# Patient Record
Sex: MALE | Race: WHITE | Employment: OTHER | URBAN - METROPOLITAN AREA
[De-identification: names, ages, dates, MRNs, and addresses within clinical notes are randomized per-mention and may not be internally consistent; named-entity substitution may affect disease eponyms.]

---

## 2020-07-16 ENCOUNTER — TELEPHONE (OUTPATIENT)
Dept: PULMONOLOGY | Age: 40
End: 2020-07-16

## 2020-07-16 ENCOUNTER — OFFICE VISIT (OUTPATIENT)
Dept: FAMILY MEDICINE CLINIC | Age: 40
End: 2020-07-16
Payer: MEDICARE

## 2020-07-16 VITALS
HEART RATE: 80 BPM | OXYGEN SATURATION: 97 % | DIASTOLIC BLOOD PRESSURE: 78 MMHG | HEIGHT: 71 IN | WEIGHT: 230.6 LBS | TEMPERATURE: 97.7 F | RESPIRATION RATE: 16 BRPM | BODY MASS INDEX: 32.28 KG/M2 | SYSTOLIC BLOOD PRESSURE: 120 MMHG

## 2020-07-16 PROCEDURE — 99203 OFFICE O/P NEW LOW 30 MIN: CPT | Performed by: NURSE PRACTITIONER

## 2020-07-16 PROCEDURE — 99386 PREV VISIT NEW AGE 40-64: CPT | Performed by: NURSE PRACTITIONER

## 2020-07-16 RX ORDER — TRAZODONE HYDROCHLORIDE 100 MG/1
100 TABLET ORAL NIGHTLY
Qty: 30 TABLET | Refills: 2 | Status: SHIPPED | OUTPATIENT
Start: 2020-07-16 | End: 2020-10-07

## 2020-07-16 RX ORDER — TRAZODONE HYDROCHLORIDE 50 MG/1
50 TABLET ORAL NIGHTLY
COMMUNITY
End: 2020-07-16

## 2020-07-16 RX ORDER — VARENICLINE TARTRATE
KIT
Qty: 1 BOX | Refills: 0 | Status: SHIPPED | OUTPATIENT
Start: 2020-07-16 | End: 2020-08-11

## 2020-07-16 RX ORDER — ATOMOXETINE 80 MG/1
80 CAPSULE ORAL DAILY
COMMUNITY
End: 2020-07-16 | Stop reason: SDUPTHER

## 2020-07-16 RX ORDER — ATOMOXETINE 80 MG/1
80 CAPSULE ORAL DAILY
Qty: 30 CAPSULE | Refills: 2 | Status: SHIPPED | OUTPATIENT
Start: 2020-07-16 | End: 2020-10-07

## 2020-07-16 SDOH — ECONOMIC STABILITY: FOOD INSECURITY: WITHIN THE PAST 12 MONTHS, THE FOOD YOU BOUGHT JUST DIDN'T LAST AND YOU DIDN'T HAVE MONEY TO GET MORE.: NEVER TRUE

## 2020-07-16 SDOH — ECONOMIC STABILITY: TRANSPORTATION INSECURITY
IN THE PAST 12 MONTHS, HAS THE LACK OF TRANSPORTATION KEPT YOU FROM MEDICAL APPOINTMENTS OR FROM GETTING MEDICATIONS?: NO

## 2020-07-16 SDOH — HEALTH STABILITY: PHYSICAL HEALTH: ON AVERAGE, HOW MANY DAYS PER WEEK DO YOU ENGAGE IN MODERATE TO STRENUOUS EXERCISE (LIKE A BRISK WALK)?: 0 DAYS

## 2020-07-16 SDOH — HEALTH STABILITY: MENTAL HEALTH
STRESS IS WHEN SOMEONE FEELS TENSE, NERVOUS, ANXIOUS, OR CAN'T SLEEP AT NIGHT BECAUSE THEIR MIND IS TROUBLED. HOW STRESSED ARE YOU?: ONLY A LITTLE

## 2020-07-16 SDOH — ECONOMIC STABILITY: FOOD INSECURITY: WITHIN THE PAST 12 MONTHS, YOU WORRIED THAT YOUR FOOD WOULD RUN OUT BEFORE YOU GOT MONEY TO BUY MORE.: NEVER TRUE

## 2020-07-16 SDOH — ECONOMIC STABILITY: TRANSPORTATION INSECURITY
IN THE PAST 12 MONTHS, HAS LACK OF TRANSPORTATION KEPT YOU FROM MEETINGS, WORK, OR FROM GETTING THINGS NEEDED FOR DAILY LIVING?: NO

## 2020-07-16 SDOH — ECONOMIC STABILITY: INCOME INSECURITY: HOW HARD IS IT FOR YOU TO PAY FOR THE VERY BASICS LIKE FOOD, HOUSING, MEDICAL CARE, AND HEATING?: NOT HARD AT ALL

## 2020-07-16 ASSESSMENT — ENCOUNTER SYMPTOMS
EYES NEGATIVE: 1
GASTROINTESTINAL NEGATIVE: 1
RESPIRATORY NEGATIVE: 1

## 2020-07-16 ASSESSMENT — PATIENT HEALTH QUESTIONNAIRE - PHQ9
SUM OF ALL RESPONSES TO PHQ QUESTIONS 1-9: 0
SUM OF ALL RESPONSES TO PHQ QUESTIONS 1-9: 0
2. FEELING DOWN, DEPRESSED OR HOPELESS: 0
1. LITTLE INTEREST OR PLEASURE IN DOING THINGS: 0
SUM OF ALL RESPONSES TO PHQ9 QUESTIONS 1 & 2: 0

## 2020-07-16 NOTE — PROGRESS NOTES
2020    Leslie Forman (:  1980) is a 36 y.o. male, here for evaluation of the following medical concerns:    HPI  Ilda Duncan is here to establish care. He does not remember the last time he saw a PCP. , lives with girlfriend. Has 3 kids. Works full time as a . States that he is a recovering alcoholic and drug addict (methamphetamines). He states that he has been clean for 9 months. He does use chewing tobacco.  He would like to get on Chantix again (it worked for him 10 years ago). He has a very positive attitude and states that he is excited that he is getting his \"life together\". He has an appointment with a dentist in August.  He does not get routine vision exams. Denies following a healthy diet or exercise program.      Pt states that he got his Tdap when he was in treatment 9 mths ago. He states that he was prescribed Strattera while in rehab for his ADHD. He did not want to be on a stimulant. They also prescribed Trazodone 50 mg nightly for insomnia. He states that it does help him to fall asleep, but is awake every 3 hours. He states that his girlfriend stated that he snores very loudly, and his smart watch noted his heart rate decreasing to 30-40 bpm at times. He is concerned that he may have sleep apnea. Review of Systems   Constitutional: Negative. HENT: Negative. Eyes: Negative. Respiratory: Negative. Cardiovascular: Negative. Gastrointestinal: Negative. Genitourinary: Negative. Musculoskeletal: Negative. Skin: Negative. Neurological: Negative. Psychiatric/Behavioral: Positive for sleep disturbance. Negative for agitation, behavioral problems, confusion, decreased concentration, dysphoric mood, hallucinations, self-injury and suicidal ideas. The patient is not nervous/anxious and is not hyperactive. Prior to Visit Medications    Medication Sig Taking?  Authorizing Provider   varenicline (CHANTIX STARTING MONTH SHARRI) 0.5 MG X 11 & 1 MG X 42 tablet Take by mouth. Yes AMANDA Mc CNP   traZODone (DESYREL) 100 MG tablet Take 1 tablet by mouth nightly Yes AMANDA Mc CNP   atomoxetine (STRATTERA) 80 MG capsule Take 1 capsule by mouth daily Yes AMANDA Mc CNP        No Known Allergies    Past Medical History:   Diagnosis Date    AA (alcohol abuse)     Depression        Past Surgical History:   Procedure Laterality Date    HERNIA REPAIR         Social History     Socioeconomic History    Marital status: Single     Spouse name: Not on file    Number of children: 3    Years of education: Not on file    Highest education level: Not on file   Occupational History    Not on file   Social Needs    Financial resource strain: Not hard at all   Emotient insecurity     Worry: Never true     Inability: Never true   [employer] needs     Medical: No     Non-medical: No   Tobacco Use    Smoking status: Former Smoker     Last attempt to quit: 2018     Years since quittin.5    Smokeless tobacco: Current User     Types: Chew   Substance and Sexual Activity    Alcohol use: Not Currently     Comment: recovering alcoholic     Drug use: Not Currently     Types: Methamphetamines     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    Sexual activity: Yes     Partners: Female   Lifestyle    Physical activity     Days per week: 0 days     Minutes per session: Not on file    Stress:  Only a little   Relationships    Social connections     Talks on phone: Not on file     Gets together: Not on file     Attends Jainism service: Not on file     Active member of club or organization: Not on file     Attends meetings of clubs or organizations: Not on file     Relationship status: Not on file    Intimate partner violence     Fear of current or ex partner: Not on file     Emotionally abused: Not on file     Physically abused: Not on file     Forced sexual activity: Not on file   Other Topics Concern    Not on file   Social History Narrative    Not on file        Family History   Problem Relation Age of Onset    Alcohol Abuse Maternal Grandfather     Heart Attack Paternal Grandfather     Alcohol Abuse Paternal Grandfather        Vitals:    07/16/20 1034   BP: 120/78   Site: Right Upper Arm   Position: Sitting   Cuff Size: Medium Adult   Pulse: 80   Resp: 16   Temp: 97.7 °F (36.5 °C)   TempSrc: Oral   SpO2: 97%   Weight: 230 lb 9.6 oz (104.6 kg)   Height: 5' 11\" (1.803 m)     Estimated body mass index is 32.16 kg/m² as calculated from the following:    Height as of this encounter: 5' 11\" (1.803 m). Weight as of this encounter: 230 lb 9.6 oz (104.6 kg). Physical Exam  Vitals signs reviewed. Constitutional:       Appearance: Normal appearance. He is well-developed. He is not ill-appearing. HENT:      Head: Normocephalic. Right Ear: Tympanic membrane, ear canal and external ear normal.      Left Ear: Tympanic membrane, ear canal and external ear normal.      Nose: Nose normal.      Mouth/Throat:      Mouth: Mucous membranes are moist.      Pharynx: Oropharynx is clear. Eyes:      General: Lids are normal.         Right eye: No discharge. Left eye: No discharge. Extraocular Movements: Extraocular movements intact. Conjunctiva/sclera: Conjunctivae normal.      Pupils: Pupils are equal, round, and reactive to light. Neck:      Musculoskeletal: Full passive range of motion without pain and normal range of motion. Normal range of motion. No edema, crepitus or pain with movement. Thyroid: No thyroid mass, thyromegaly or thyroid tenderness. Vascular: Normal carotid pulses. No carotid bruit or JVD. Cardiovascular:      Rate and Rhythm: Normal rate and regular rhythm. Pulses: Normal pulses. Heart sounds: Normal heart sounds. No murmur. Pulmonary:      Effort: Pulmonary effort is normal.      Breath sounds: Normal breath sounds.    Abdominal: General: Abdomen is flat. Bowel sounds are normal.      Palpations: Abdomen is soft. Tenderness: There is no abdominal tenderness. Musculoskeletal: Normal range of motion. Lymphadenopathy:      Cervical: No cervical adenopathy. Right cervical: No superficial cervical adenopathy. Left cervical: No superficial cervical adenopathy. Skin:     General: Skin is warm and dry. Capillary Refill: Capillary refill takes less than 2 seconds. Neurological:      General: No focal deficit present. Mental Status: He is alert and oriented to person, place, and time. Psychiatric:         Mood and Affect: Mood normal.         Behavior: Behavior normal. Behavior is cooperative. Thought Content: Thought content normal.         Judgment: Judgment normal.         ASSESSMENT/PLAN:  1. Encounter to establish care    2. Healthcare maintenance  Pt due for routine labs. Encouraged healthy diet and exercise. Also recommended that he get routine vision exams.    - CBC Auto Differential  - Comprehensive Metabolic Panel w/ Reflex to MG  - HIV Screen  - Lipid Panel; Future  - T4, Free  - TSH without Reflex  - HEMOGLOBIN A1C; Future    3. Encounter for tobacco use cessation counseling  Pt states that he used Chantix 10 years ago and successfully quit using all tobacco.  He states that he currently uses chewing tobacco, and would like to try Chantix again. - varenicline (CHANTIX STARTING MONTH SHARRI) 0.5 MG X 11 & 1 MG X 42 tablet; Take by mouth. Dispense: 1 box; Refill: 0    4. Attention deficit hyperactivity disorder (ADHD), unspecified ADHD type  Pt states that he has a long h/o ADHD. He does not want to take an amphetamine d/t his drug use history. States that he was prescribed Strattera while in rehab and it is working well for him. - atomoxetine (STRATTERA) 80 MG capsule; Take 1 capsule by mouth daily  Dispense: 30 capsule; Refill: 2    5. Insomnia, unspecified type  H/o insomnia.   States that he was prescribed Trazodone 50 mg nightly while in rehab. He states that it was working well for him, but now he is having difficulty staying asleep. Will increase to 100 mg nightly. Pt aware that the Chantix may cause sleep disturbances, will monitor.      - traZODone (DESYREL) 100 MG tablet; Take 1 tablet by mouth nightly  Dispense: 30 tablet; Refill: 2    6. Sleep disturbance  Pt states that he wakes up every 3 hours. His girlfriend complains that he snores very loudly, and he noticed that his smart watch recorded his heart rate dropping to 30-40 beats/min while sleeping. He is concerned that he may have sleep apnea and would like to do a sleep study. - Devi Razo MD, Sleep Medicine, [facility]      Return in about 3 months (around 10/16/2020) for smoking cessation, ADHD, insomnia, sleep disturbance. An  electronic signature was used to authenticate this note.     --AMANDA Finch - CNP on 7/16/2020 at 11:53 AM

## 2020-07-16 NOTE — TELEPHONE ENCOUNTER
Within this Telehealth Consent, the terms you and yours refer to the person using the Telehealth Service (Service), or in the case of a use of the Service by or on behalf of a minor, you and yours refer to and include (i) the parent or legal guardian who provides consent to the use of the Service by such minor or uses the Service on behalf of such minor, and (ii) the minor for whom consent is being provided or on whose behalf the Service is being utilized. When using Service, you will be consulting with your health care providers via the use of Telehealth.   Telehealth involves the delivery of healthcare services using electronic communications, information technology or other means between a healthcare provider and a patient who are not in the same physical location. Telehealth may be used for diagnosis, treatment, follow-up and/or patient education, and may include, but is not limited to, one or more of the following:    Electronic transmission of medical records, photo images, personal health information or other data between a patient and a healthcare provider    Interactions between a patient and healthcare provider via audio, video and/or data communications    Use of output data from medical devices, sound and video files    Anticipated Benefits   The use of Telehealth by your Provider(s) through the Service may have the following possible benefits:    Making it easier and more efficient for you to access medical care and treatment for the conditions treated by such Provider(s) utilizing the Service    Allowing you to obtain medical care and treatment by Provider(s) at times that are convenient for you    Enabling you to interact with Provider(s) without the necessity of an in-office appointment     Possible Risks   While the use of Telehealth can provide potential benefits for you, there are also potential risks associated with the use of Telehealth.  These risks include, but may not be limited to the following:    Your Provider(s) may not able to provide medical treatment for your particular condition and you may be required to seek alternative healthcare or emergency care services.  The electronic systems or other security protocols or safeguards used in the Service could fail, causing a breach of privacy of your medical or other information.  Given regulatory requirements in certain jurisdictions, your Provider(s) diagnosis and/or treatment options, especially pertaining to certain prescriptions, may be limited. Acceptance   1. You understand that Services will be provided via Telehealth. This process involves the use of HIPAA compliant and secure, real-time audio-visual interfacing with a qualified and appropriately trained provider located at [facility]. 2. You understand that, under no circumstances, will this session be recorded. 3. You understand that the Provider(s) at [facility] and other clinical participants will be party to the information obtained during the Telehealth session in accordance with best medical practices. 4. You understand that the information obtained during the Telehealth session will be used to help determine the most appropriate treatment options. 5. You understand that You have the right to revoke this consent at any point in time. 6. You understand that Telehealth is voluntary, and that continued treatment is not dependent upon consent. 7. You understand that, in the event of non-consent to Telehealth services and/or technical difficulties, you will obtain services as typically provided in the absence of Telehealth technology. 8. You understand that this consent will be kept in Your medical record. 9. No potential benefits from the use of Telehealth or specific results can be guaranteed. Your condition may not be cured or improved and, in some cases, may get worse.    10. There are limitations in the provision of medical care and treatment via Telehealth and the Service and you may not be able to receive diagnosis and/or treatment through the Service for every condition for which you seek diagnosis and/or treatment. 11. There are potential risks to the use of Telehealth, including but not limited to the risks described in this Telehealth Consent. 12. Your Provider(s) have discussed the use of Telehealth and the Service with you, including the benefits and risks of such and you have provided oral consent to your Provider(s) for the use of Telehealth and the Service. 15. You understand that it is your duty to provide your Provider(s) truthful, accurate and complete information, including all relevant information regarding care that you may have received or may be receiving from other healthcare providers outside of the Service. 14. You understand that each of your Provider(s) may determine in his or sole discretion that your condition is not suitable for diagnosis and/or treatment using the Service, and that you may need to seek medical care and treatment a specialist or other healthcare provider, outside of the Service. 15. You understand that you are fully responsible for payment for all services provided by Provider(s) or through use of the Service and that you may not be able to use third-party insurance. 16. You represent that (a) you have read this Telehealth Consent carefully, (b) you understand the risks and benefits of the Service and the use of Telehealth in the medical care and treatment provided to you by Provider(s) using the Service, and (c) you have the legal capacity and authority to provide this consent for yourself and/or the minor for which you are consenting under applicable federal and state laws, including laws relating to the age of [de-identified] and/or parental/guardian consent.    17. You give your informed consent to the use of Telehealth by Provider(s) using the Service under the terms described in the Terms of Service and this Telehealth Consent. The patient was read the following statement and has consented to the visit as of 7/16/20. The patient has been scheduled for their first telehealth visit on 7/17/20 with [email or web address].

## 2020-07-16 NOTE — PATIENT INSTRUCTIONS
Patient Education        Well Visit, Ages 25 to 48: Care Instructions  Your Care Instructions     Physical exams can help you stay healthy. Your doctor has checked your overall health and may have suggested ways to take good care of yourself. He or she also may have recommended tests. At home, you can help prevent illness with healthy eating, regular exercise, and other steps. Follow-up care is a key part of your treatment and safety. Be sure to make and go to all appointments, and call your doctor if you are having problems. It's also a good idea to know your test results and keep a list of the medicines you take. How can you care for yourself at home? · Reach and stay at a healthy weight. This will lower your risk for many problems, such as obesity, diabetes, heart disease, and high blood pressure. · Get at least 30 minutes of physical activity on most days of the week. Walking is a good choice. You also may want to do other activities, such as running, swimming, cycling, or playing tennis or team sports. Discuss any changes in your exercise program with your doctor. · Do not smoke or allow others to smoke around you. If you need help quitting, talk to your doctor about stop-smoking programs and medicines. These can increase your chances of quitting for good. · Talk to your doctor about whether you have any risk factors for sexually transmitted infections (STIs). Having one sex partner (who does not have STIs and does not have sex with anyone else) is a good way to avoid these infections. · Use birth control if you do not want to have children at this time. Talk with your doctor about the choices available and what might be best for you. · Protect your skin from too much sun. When you're outdoors from 10 a.m. to 4 p.m., stay in the shade or cover up with clothing and a hat with a wide brim. Wear sunglasses that block UV rays.  Even when it's cloudy, put broad-spectrum sunscreen (SPF 30 or higher) on any exposed skin. · See a dentist one or two times a year for checkups and to have your teeth cleaned. · Wear a seat belt in the car. Follow your doctor's advice about when to have certain tests. These tests can spot problems early. For everyone  · Cholesterol. Have the fat (cholesterol) in your blood tested after age 21. Your doctor will tell you how often to have this done based on your age, family history, or other things that can increase your risk for heart disease. · Blood pressure. Have your blood pressure checked during a routine doctor visit. Your doctor will tell you how often to check your blood pressure based on your age, your blood pressure results, and other factors. · Vision. Talk with your doctor about how often to have a glaucoma test.  · Diabetes. Ask your doctor whether you should have tests for diabetes. · Colon cancer. Your risk for colorectal cancer gets higher as you get older. Some experts say that adults should start regular screening at age 48 and stop at age 76. Others say to start before age 48 or continue after age 76. Talk with your doctor about your risk and when to start and stop screening. For women  · Breast exam and mammogram. Talk to your doctor about when you should have a clinical breast exam and a mammogram. Medical experts differ on whether and how often women under 50 should have these tests. Your doctor can help you decide what is right for you. · Cervical cancer screening test and pelvic exam. Begin with a Pap test at age 24. The test often is part of a pelvic exam. Starting at age 27, you may choose to have a Pap test, an HPV test, or both tests at the same time (called co-testing). Talk with your doctor about how often to have testing. · Tests for sexually transmitted infections (STIs). Ask whether you should have tests for STIs. You may be at risk if you have sex with more than one person, especially if your partners do not wear condoms.   For men  · Tests for nicotine into your mouth. · Patches stick to your skin. They slowly release nicotine into your bloodstream.  · An inhaler has a mendez that contains nicotine. You breathe in a puff of nicotine vapor through your mouth and throat. · Nasal spray releases a mist that contains nicotine. What are key points about this decision? · Using medicines can double your chances of quitting smoking. They can ease cravings and withdrawal symptoms. · Getting counseling along with using medicine can raise your chances of quitting even more. · If you smoke fewer than 5 cigarettes a day, you may not need medicines to help you quit smoking. · These medicines have less nicotine than cigarettes. And by itself, nicotine is not nearly as harmful as smoking. The tars, carbon monoxide, and other toxic chemicals in tobacco cause the harmful effects. · The side effects of nicotine replacement products depend on the type of product. For example, a patch can make your skin red and itchy. Medicines in pill form can make you sick to your stomach. They can also cause dry mouth and trouble sleeping. For most people, the side effects are not bad enough to make them stop using the products. Why might you choose to use medicines to quit smoking? · You have tried on your own to stop smoking, but you were not able to stop. · You smoke more than 5 cigarettes a day. · You want to increase your chances of quitting smoking. · You want to reduce your cravings and withdrawal symptoms. · You feel the benefits of medicine outweigh the side effects. Why might you choose not to use medicine? · You want to try quitting on your own by stopping all at once (\"cold turkey\"). · You want to cut back slowly on the number of cigarettes you smoke. · You smoke fewer than 5 cigarettes a day. · You do not like using medicine. · You feel the side effects of medicines outweigh the benefits. · You are worried about the cost of medicines.   Your decision  Thinking about the facts and your feelings can help you make a decision that is right for you. Be sure you understand the benefits and risks of your options, and think about what else you need to do before you make the decision. Where can you learn more? Go to [email or web address] [email or web address] org and sign in to your Meet My Friends account. Enter R466 in the OnShift box to learn more about \"Deciding About Using Medicines To Quit Smoking. \"     If you do not have an account, please click on the \"Sign Up Now\" link. Current as of: March 12, 2020               Content Version: 12.5  © 2096-8539 [employer]. Care instructions adapted under license by [facility]. If you have questions about a medical condition or this instruction, always ask your healthcare professional. [address] any warranty or liability for your use of this information. Patient Education        Insomnia: Care Instructions  Your Care Instructions     Insomnia is the inability to sleep well. It is a common problem for most people at some time. Insomnia may make it hard for you to get to sleep, stay asleep, or sleep as long as you need to. This can make you tired and grouchy during the day. It can also make you forgetful, less effective at work, and unhappy. Insomnia can be caused by conditions such as depression or anxiety. Pain can also affect your ability to sleep. When these problems are solved, the insomnia usually clears up. But sometimes bad sleep habits can cause insomnia. If insomnia is affecting your work or your enjoyment of life, you can take steps to improve your sleep. Follow-up care is a key part of your treatment and safety. Be sure to make and go to all appointments, and call your doctor if you are having problems. It's also a good idea to know your test results and keep a list of the medicines you take. How can you care for yourself at home?   What to avoid Information box to learn more about \"Insomnia: Care Instructions. \"     If you do not have an account, please click on the \"Sign Up Now\" link. Current as of: December 16, 2019               Content Version: 12.5  © 2226-8928 [employer]. Care instructions adapted under license by [facility] ([facility]). If you have questions about a medical condition or this instruction, always ask your healthcare professional. [address] any warranty or liability for your use of this information. Patient Education        Learning About Attention Deficit Hyperactivity Disorder (ADHD) in Adults  What is ADHD? Attention deficit hyperactivity disorder (ADHD) is a condition in which people have a hard time paying attention. Adults with ADHD also may be more active than normal. They tend to act without thinking. ADHD may make it harder for them to focus, get organized, and finish tasks. ADHD most often starts in childhood and lasts into adulthood. Many adults don't know that they have ADHD until their children are diagnosed. Then they begin to see their own symptoms. Doctors don't know what causes ADHD. But it tends to run in families. What are the symptoms? The most common types of ADHD symptoms in adults are attention problems and hyperactivity. Attention problems  Adults with ADHD often find it hard to:  · Finish tasks that don't interest them or aren't easy. But they may become obsessed with activities that they find interesting and enjoy. · Keep relationships. · Focus their attention on conversations, reading materials, or jobs. They may change jobs a lot. · Remember things. They may misplace or lose things. · Pay attention. They are easily distracted. They find it hard to focus on one task. · Think before they act. They may make quick decisions. They may act before they think about the effect of their actions. Hyperactivity  Adults with ADHD may:  · Fidget.  They may swing their legs, shift in their seats, or tap their fingers. · Move around a lot. They may feel \"revved up\" or on the go. They may not be able to slow down until they are very tired. · Find it hard to relax. They may feel restless and find it hard to do quiet things like read or watch TV. How does ADHD affect daily life? ADHD in adults may affect:  · Job performance. They may find it hard to organize their work, manage their time, and focus on one task at a time. They may forget, misplace, or lose things. They may quit their jobs out of boredom. · Relationships. Adults with ADHD may find it hard to focus their attention on conversations. It is hard for them to \"read\" the behavior and moods of others and express their own feelings. · Temper. They may get easily frustrated. This often can make it harder for them to deal with stress. These adults may overreact and have a short, quick temper. · The ability to solve problems. Adults who have a hard time waiting for things they want may act before they think about the effect of their actions. They may take part in risky behaviors. These include unprotected sex, unsafe driving, alcohol and drug use, or unwise business ventures. How is ADHD treated? ADHD can be treated with medicines, behavior training, or counseling. Or it may be a combination of these treatments. Medicines  Stimulant medicines are most often used to treat ADHD. These may include:  · Amphetamines (such as Adderall and Dexedrine). · Methylphenidate (such as Concerta, Daytrana, Focalin, Metadate, and Ritalin). Other medicines that may be used are:  · Atomoxetine, such as Strattera, a nonstimulant medicine for ADHD. · Antihypertensives. These include clonidine (such as Catapres) and guanfacine (such as Tenex). · Antidepressants, which include bupropion (Wellbutrin). Behavior training  Behavior training can help adults with ADHD learn how to:  · Get organized.  A daily organizer or planner can help these

## 2020-07-17 ENCOUNTER — VIRTUAL VISIT (OUTPATIENT)
Dept: PULMONOLOGY | Age: 40
End: 2020-07-17
Payer: MEDICARE

## 2020-07-17 PROCEDURE — 99203 OFFICE O/P NEW LOW 30 MIN: CPT | Performed by: NURSE PRACTITIONER

## 2020-07-17 ASSESSMENT — SLEEP AND FATIGUE QUESTIONNAIRES
HOW LIKELY ARE YOU TO NOD OFF OR FALL ASLEEP WHILE SITTING INACTIVE IN A PUBLIC PLACE: 0
HOW LIKELY ARE YOU TO NOD OFF OR FALL ASLEEP WHEN YOU ARE A PASSENGER IN A CAR FOR AN HOUR WITHOUT A BREAK: 1
HOW LIKELY ARE YOU TO NOD OFF OR FALL ASLEEP WHILE SITTING AND TALKING TO SOMEONE: 0
HOW LIKELY ARE YOU TO NOD OFF OR FALL ASLEEP IN A CAR, WHILE STOPPED FOR A FEW MINUTES IN TRAFFIC: 0
HOW LIKELY ARE YOU TO NOD OFF OR FALL ASLEEP WHILE SITTING AND READING: 1
HOW LIKELY ARE YOU TO NOD OFF OR FALL ASLEEP WHILE SITTING QUIETLY AFTER LUNCH WITHOUT ALCOHOL: 1
HOW LIKELY ARE YOU TO NOD OFF OR FALL ASLEEP WHILE LYING DOWN TO REST IN THE AFTERNOON WHEN CIRCUMSTANCES PERMIT: 2
ESS TOTAL SCORE: 7
HOW LIKELY ARE YOU TO NOD OFF OR FALL ASLEEP WHILE WATCHING TV: 2

## 2020-07-17 NOTE — LETTER
[facility] Pulmonary, Critical Care, and Sleep  [address] [facility] [address] [address]  Phone: [phone]  Fax: [phone]    AMANDA Yi -*        July 17, 2020       Patient: Josef Romero   MR Number: <[id number]>   YOB: 1980   Date of Visit: 7/17/2020       Dear Juan J Cost:    Thank you for the request for consultation for Josef Romero to me for the evaluation of sleep disturbance. Below are the relevant portions of my assessment and plan of care. Assessment:       · Snoring  · Observed sleep apnea   · Hypersomnia   · ADHD- taking straterra  · Obesity  · Sleep onset insomnia-psychophysiological hyperarousal, poor sleep hygiene, probable CHAYO likely contributing. Taking trazodone per PCP with good benefit  · History of alcohol abuse- clean/sober 18 months        Plan:      · HST to evaluate forsleep related breathing disorder. · Treatment options were discussed with patient if HST reveals CHAYO, including CPAP therapy, oral appliances and upper airway surgery. Patient is in favor of auto CPAP if HST is positive for CHAYO  Trial auto CPAP 816 CM H2O if HST is positive for obstructive sleep apnea  · Sleep hygiene  · Cognitive behavioral therapy was discussed with patient including stimulus control and sleep restriction  · Avoid sedatives, alcohol and caffeinated drinks at bed time. · No driving motorized vehicles or operating heavy machinery while fatigue, drowsy or sleepy. · Weight loss is also recommended as a long-term intervention. · Complications of CHAYO if not treated were discussed with patient patient to include systemic hypertension, pulmonary hypertension, cardiovascular morbidities, car accidents and all cause mortality. Discussed pathophysiology of CHAYO with patient today- video shown in office. Patient education regarding sleep tips    If you have questions, please do not hesitate to call me.  I look forward to following Yoselyn Diaz along with you.     Sincerely,        AMANDA Bergeron CNP    CC providers:  AMANDA Wayne CNP  [address] [place] [id number] [facility]  VIA [place]

## 2020-07-17 NOTE — PROGRESS NOTES
Patient ID: Seth Garcia is a 36 y.o. male who is being seen today for   Chief Complaint   Patient presents with    New Patient     referred by Doug Murphy CNP       Referring: COLIN Moffett  HPI:   Seth Garcia is a 36 y.o. male for televideo appointment via video and audio doxy. me virtual visit for sleep disturbance evaluation. Patient reports snoring at night for the past 10 years. Worse in supine position. Wakes self snoring. Has witnessed apnea. No restorative sleep. +dry mouth upon awakening, sometimes sore throat. Fatigue and tiredness during the day. No history of sleep apnea. Bedtime 930-10 pm and rise time is 630 am. It takes few minutes to fall asleep takes trazodone  mg. +TV in bedroom, sleeps with TV on. No nocturia. Wakes up 3-4 times at night, gets a drink then back to bed. It takes 5-10 minutes to fall back a sleep. Takes 1 nap during the day ( 45 minutes). No headache in am. States fitbit says HR in 30-40s at night at times. No car wrecks or near wrecks because of the sleepiness. No nodding off while driving. Gained 50 pounds in the past 1-2 years. No forgetfulness or decreased concentration, takes Strattera for ADHD. Drinks 4 caffinated beverages per day. No alcohol, sober 18 mo. No restless feelings in legs at night. No teeth grinding. No nightmares. No sleep walking. No night time panic attacks. No narcotics. No drug abuse. +history of depression, no medications states feels pretty well controlled. No history of anxiety. No history of atrial fibrillation. No history of DM. No history of HTN. No history of ischemic heart disease. No history of stroke. ESS is 7. No smoking, chews tobacco- using chantix. No FH for RLS or narcolepsy.  +FH for CHAYO    Sleep Medicine 7/17/2020   Sitting and reading 1   Watching TV 2   Sitting, inactive in a public place (e.g. a theatre or a meeting) 0   As a passenger in a car for an hour without a break 1   Lying down to rest in the Neck: No visualized mass. Trachea is midline   Eyes: EOM intact. No visible discharge. Resp:No visualized signs of difficulty breathing or respiratory distress, speaking in full sentences. Respiratory effort normal.  Neuro: Awake. Alert. Able to follow commands. No facial asymmetry. Psych: Oriented x 3. No anxiety. Normal affect. DATA:       Assessment:       · Snoring  · Observed sleep apnea   · Hypersomnia   · ADHD- taking straterra  · Obesity  · Sleep onset insomnia-psychophysiological hyperarousal, poor sleep hygiene, probable CHAYO likely contributing. Taking trazodone per PCP with good benefit  · History of alcohol abuse- clean/sober 18 months        Plan:      · HST to evaluate forsleep related breathing disorder. · Treatment options were discussed with patient if HST reveals CHAYO, including CPAP therapy, oral appliances and upper airway surgery. Patient is in favor of auto CPAP if HST is positive for CHAYO  Trial auto CPAP 8-16 CM H2O if HST is positive for obstructive sleep apnea  · Sleep hygiene  · Cognitive behavioral therapy was discussed with patient including stimulus control and sleep restriction  · Avoid sedatives, alcohol and caffeinated drinks at bed time. · No driving motorized vehicles or operating heavy machinery while fatigue, drowsy or sleepy. · Weight loss is also recommended as a long-term intervention. · Complications of CHAYO if not treated were discussed with patient patient to include systemic hypertension, pulmonary hypertension, cardiovascular morbidities, car accidents and all cause mortality. Discussed pathophysiology of CHAYO with patient today- video shown in office. Patient education regarding sleep tips    Consent for telehealth visit was obtained and is noted in chart      THIS VISIT WAS COMPLETED VIRTUALLY USING DOXY. Joe Duran is a 36 y.o. male being evaluated by a Virtual Visit (video visit) encounter to address concerns as mentioned above.   SUSHIL caregiver was present when appropriate. Due to this being a TeleHealth encounter (During TQREY-46 public health emergency), evaluation of the following organ systems was limited: Vitals/Constitutional/EENT/Resp/CV/GI//MS/Neuro/Skin/Heme-Lymph-Imm. Pursuant to the emergency declaration under the [address] [place] [address], [address] [place] [address] and the [employer] and [employer] Act, this Virtual Visit was conducted with patient's (and/or legal guardian's) consent, to reduce the patient's risk of exposure to COVID-19 and provide necessary medical care. The patient (and/or legal guardian) has also been advised to contact this office for worsening conditions or problems, and seek emergency medical treatment and/or call 911 if deemed necessary. Patient identification was verified at the start of the visit: Yes    Total time spent for this encounter: Not billed by time    Services were provided through a video synchronous discussion virtually to substitute for in-person clinic visit. Patient and provider were located at their individual homes. --AMANDA Banuelos CNP on 7/17/2020 at 2:48 PM    An electronic signature was used to authenticate this note.

## 2020-07-17 NOTE — PATIENT INSTRUCTIONS
stimulant or wake-up effect and will cause fragmented sleep. Sleep rituals are important. Give your body clues it is time to slow down and sleep. Examples include; yoga, deep breathing, listen to relaxing music, a hot bath or a few minutes of reading. Have a fixed bedtime and awakening time, Even on weekends! You will feel better keeping a regular sleep cycle, even if you are retired or not working. Get into your favorite sleep position. If not asleep in 30 minutes, get up and do something boring until you feel sleepy. Remember not to expose yourself to bright lights such as TV, phone or tablet screens. Only use your bed for sleeping. Do not use your bed as an office, workroom or recreation room. Use comfortable bedding. Uncomfortable bedding can prevent good sleep. Ensure your bedroom is quiet and comfortable. A cooler room along with enough blankets to stay warm is recommended. If your room is too noisy, try a white noise machine. If too bright, try black out shades or an eye mask. Dont take worries to bed. Leave worries about work, school etc. behind you when you go to bed. Some people find it helpful to assign a worry period in the evening or late afternoon to write down your worries and get them out of your system.

## 2020-07-30 ENCOUNTER — VIRTUAL VISIT (OUTPATIENT)
Dept: FAMILY MEDICINE CLINIC | Age: 40
End: 2020-07-30
Payer: MEDICARE

## 2020-07-30 ENCOUNTER — NURSE TRIAGE (OUTPATIENT)
Dept: OTHER | Facility: CLINIC | Age: 40
End: 2020-07-30

## 2020-07-30 PROCEDURE — 99213 OFFICE O/P EST LOW 20 MIN: CPT | Performed by: PHYSICIAN ASSISTANT

## 2020-07-30 ASSESSMENT — ENCOUNTER SYMPTOMS
DIARRHEA: 0
VOMITING: 0
RHINORRHEA: 0
SINUS PRESSURE: 0
VOICE CHANGE: 1
COUGH: 0
SINUS PAIN: 0
NAUSEA: 0
SHORTNESS OF BREATH: 0
TROUBLE SWALLOWING: 0
CHEST TIGHTNESS: 0
SORE THROAT: 0
EYE ITCHING: 1
WHEEZING: 0

## 2020-07-30 NOTE — LETTER
[address]  [address] [address]  Phone: [phone]  Fax: [phone]    [facility]        July 30, 2020     Patient: Urban Rizo   YOB: 1980   Date of Visit: 7/30/2020       To Whom It May Concern: It is my medical opinion that Urban Rizo may return to full duty immediately with no restrictions starting on 07/27/2020. If you have any questions or concerns, please don't hesitate to call.     Sincerely,          JONAH Corrales

## 2020-07-30 NOTE — PROGRESS NOTES
2020    TELEHEALTH EVALUATION -- Audio/Visual (During [facility]XV[facility]-70 public health emergency)    HPI:    Whit Zapata (:  1980) has requested an audio/video evaluation for the following concern(s):    Fatigue:  Pt took off work the 21, 25, 23, and 24 for fatigue. The patient reports that he started feeling fatigue after re-starting Strattera. He stopped this medication and reports that the symptoms have been steadily improving. He has some itching eyes, post nasal drip and throat hoarseness which he reports is standard for his allergies this time of year. He denies any cough, SOB, diarrhea, nausea, vomiting, loss of taste or smell. He has been requesting a letter stating that he can return to work. He has been at work all of this week. Review of Systems   Constitutional: Positive for fatigue. Negative for activity change, appetite change and fever. HENT: Positive for congestion and voice change. Negative for postnasal drip, rhinorrhea, sinus pressure, sinus pain, sneezing, sore throat and trouble swallowing. Eyes: Positive for itching. Respiratory: Negative for cough, chest tightness, shortness of breath and wheezing. Gastrointestinal: Negative for diarrhea, nausea and vomiting. Musculoskeletal: Negative for myalgias. Hematological: Negative for adenopathy. Prior to Visit Medications    Medication Sig Taking? Authorizing Provider   varenicline (CHANTIX STARTING MONTH SHARRI) 0.5 MG X 11 & 1 MG X 42 tablet Take by mouth.  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        No Known Allergies,   Past Medical History:   Diagnosis Date    AA (alcohol abuse)     Depression    ,   Past Surgical History:   Procedure Laterality Date    HERNIA REPAIR     ,   Social History     Tobacco Use    Smoking status: Former Smoker     Packs/day: 1.00     Years: 14.00     Pack years: 14.00     Last attempt to quit: 2018     Years since quittin.5    Smokeless tobacco: Current User     Types: Chew   Substance Use Topics    Alcohol use: Not Currently     Comment: recovering alcoholic [id number]    Drug use: Not Currently     Types: Methamphetamines     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       PHYSICAL EXAMINATION:  [ INSTRUCTIONS:  \"[x]\" Indicates a positive item  \"[]\" Indicates a negative item  -- DELETE ALL ITEMS NOT EXAMINED]  Vital Signs: (As obtained by patient/caregiver or practitioner observation)    Blood pressure-  Heart rate-    Respiratory rate- 14   Temperature-Pt does not have access to a thermometer  Pulse oximetry-     Constitutional: [x] Appears well-developed and well-nourished [x] No apparent distress      [] Abnormal-   Mental status  [x] Alert and awake  [x] Oriented to person/place/time [x]Able to follow commands      Eyes:  EOM    []  Normal  [] Abnormal-  Sclera  []  Normal  [] Abnormal -         Discharge []  None visible  [] Abnormal -    HENT:   [x] Normocephalic, atraumatic.   [] Abnormal   [x] Mouth/Throat: Mucous membranes are moist.     External Ears [x] Normal  [] Abnormal-     Neck: [x] No visualized mass     Pulmonary/Chest: [x] Respiratory effort normal.  [x] No visualized signs of difficulty breathing or respiratory distress        [] Abnormal-      Musculoskeletal:   [] Normal gait with no signs of ataxia         [x] Normal range of motion of neck        [] Abnormal- Neurological:        [x] No Facial Asymmetry (Cranial nerve 7 motor function) (limited exam to video visit)          [] No gaze palsy        [] Abnormal-         Skin:        [x] No significant exanthematous lesions or discoloration noted on facial skin         [] Abnormal-            Psychiatric:       [] Normal Affect [] No Hallucinations        [] Abnormal-     Other pertinent observable physical exam findings-     ASSESSMENT/PLAN:  1. Seasonal allergies  -  Zyrtec or flonase for allergies    2. Medication side effect  -  Pt ok to return to work. Will email note for him today      No follow-ups on file. Mag Mejia is a 36 y.o. male being evaluated by a Virtual Visit (video visit) encounter to address concerns as mentioned above. A caregiver was present when appropriate. Due to this being a TeleHealth encounter (During [facility]-13 public health emergency), evaluation of the following organ systems was limited: Vitals/Constitutional/EENT/Resp/CV/GI//MS/Neuro/Skin/Heme-Lymph-Imm. Pursuant to the emergency declaration under the [address] [place] [address] authority and the [employer] and [employer] Act, this Virtual Visit was conducted with patient's (and/or legal guardian's) consent, to reduce the patient's risk of exposure to COVID-19 and provide necessary medical care. The patient (and/or legal guardian) has also been advised to contact this office for worsening conditions or problems, and seek emergency medical treatment and/or call 911 if deemed necessary. Patient identification was verified at the start of the visit: Yes    Total time spent on this encounter: Not billed by time    Services were provided through a video synchronous discussion virtually to substitute for in-person clinic visit. Patient and provider were located at their individual homes.     --JONAH Cao on 7/30/2020 at 5:01 PM    An electronic signature was used to authenticate this note.

## 2020-07-30 NOTE — TELEPHONE ENCOUNTER
Hadn't taken strattera for 3 months, after starting strattera pt developed symptoms of fatigue and headache. Pt symptoms resolved after stopped taking medications again. Pt states he missed work for a week and needs a doctors note    transferrred call to [address] [place] [address]. Reason for Disposition  Jenniferlos Sánchez Caller has already spoken with another triager or PCP (or office), and has further questions and triager able to answer questions.     Protocols used: NO CONTACT OR DUPLICATE CONTACT CALL-ADULT-[place]

## 2020-08-11 RX ORDER — VARENICLINE TARTRATE 1 MG/1
1 TABLET, FILM COATED ORAL 2 TIMES DAILY
Qty: 60 TABLET | Refills: 3 | Status: SHIPPED | OUTPATIENT
Start: 2020-08-11 | End: 2020-11-19

## 2020-10-30 RX ORDER — TRAZODONE HYDROCHLORIDE 100 MG/1
TABLET ORAL
Qty: 30 TABLET | Refills: 2 | Status: SHIPPED | OUTPATIENT
Start: 2020-10-30 | End: 2021-01-29

## 2020-10-30 RX ORDER — ATOMOXETINE 80 MG/1
CAPSULE ORAL
Qty: 30 CAPSULE | Refills: 2 | Status: SHIPPED | OUTPATIENT
Start: 2020-10-30 | End: 2020-11-19

## 2020-10-30 NOTE — TELEPHONE ENCOUNTER
Refill Request     Last Seen: 7/30/2020    Last Written: 10/17/2020    Next Appointment:   Future Appointments   Date Time Provider Kinza Cerda   11/4/2020 11:40 AM AMANDA Matson CNP [facility]   11/18/2020  2:40 PM AMANDA Jaimes CNP [facility]       Appointment scheduled      Requested Prescriptions     Pending Prescriptions Disp Refills    traZODone (DESYREL) 100 MG tablet [Pharmacy Med Name: TRAZODONE 100 MG TABLET] 30 tablet 2     Sig: TAKE 1 TABLET BY MOUTH EVERY DAY AT NIGHT    atomoxetine (STRATTERA) 80 MG capsule [Pharmacy Med Name: ATOMOXETINE HCL 80 MG CAPSULE] 30 capsule 2     Sig: TAKE 1 CAPSULE BY MOUTH EVERY DAY

## 2020-11-06 ENCOUNTER — TELEPHONE (OUTPATIENT)
Dept: FAMILY MEDICINE CLINIC | Age: 40
End: 2020-11-06

## 2020-11-10 ENCOUNTER — HOSPITAL ENCOUNTER (OUTPATIENT)
Dept: SLEEP CENTER | Age: 40
Discharge: HOME | End: 2020-11-12
Payer: COMMERCIAL

## 2020-11-10 PROCEDURE — 95806 SLEEP STUDY UNATT&RESP EFFT: CPT

## 2020-11-12 ENCOUNTER — TELEPHONE (OUTPATIENT)
Dept: PULMONOLOGY | Age: 40
End: 2020-11-12

## 2020-11-17 ENCOUNTER — VIRTUAL VISIT (OUTPATIENT)
Dept: FAMILY MEDICINE CLINIC | Age: 40
End: 2020-11-17
Payer: COMMERCIAL

## 2020-11-17 PROBLEM — F51.01 PRIMARY INSOMNIA: Status: ACTIVE | Noted: 2020-11-17

## 2020-11-17 PROCEDURE — 99213 OFFICE O/P EST LOW 20 MIN: CPT | Performed by: NURSE PRACTITIONER

## 2020-11-17 ASSESSMENT — ENCOUNTER SYMPTOMS
RESPIRATORY NEGATIVE: 1
GASTROINTESTINAL NEGATIVE: 1

## 2020-11-17 NOTE — PROGRESS NOTES
Drug use: Not Currently     Types: Methamphetamines     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        No Known Allergies,   Past Medical History:   Diagnosis Date    AA (alcohol abuse)     Depression    ,   Past Surgical History:   Procedure Laterality Date    HERNIA REPAIR     ,   Social History     Tobacco Use    Smoking status: Former Smoker     Packs/day: 1.00     Years: 14.00     Pack years: 14.00     Start date: 1995     Last attempt to quit: 2020     Years since quittin.3    Smokeless tobacco: Former User     Types: Chew   Substance Use Topics    Alcohol use: Not Currently     Comment: recovering alcoholic     Drug use: Not Currently     Types: Methamphetamines     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   ,   Family History   Problem Relation Age of Onset    Alcohol Abuse Maternal Grandfather     Heart Attack Paternal Grandfather     Alcohol Abuse Paternal Grandfather    ,   Immunization History   Administered Date(s) Administered    Tdap (Boostrix, Adacel) 10/01/2019   ,   Health Maintenance   Topic Date Due    Varicella vaccine (1 of 2 - 2-dose childhood series) 1981    HIV screen  1995    Lipid screen  2020    Diabetes screen  2020    Flu vaccine (1) 2020    DTaP/Tdap/Td vaccine (2 - Td) 10/01/2029    Hepatitis A vaccine  Aged Out    Hepatitis B vaccine  Aged Out    Hib vaccine  Aged Out    Meningococcal (ACWY) vaccine  Aged Out    Pneumococcal 0-64 years Vaccine  Aged Out       PHYSICAL EXAMINATION:  [ INSTRUCTIONS:  \"[x]\" Indicates a positive item  \"[]\" Indicates a negative item  -- DELETE ALL ITEMS NOT EXAMINED]  Vital Signs: (As obtained by patient/caregiver or practitioner observation)    Blood pressure-  Heart rate-    Respiratory rate-    Temperature-  Pulse oximetry-     Constitutional: [x] Appears well-developed and well-nourished [x] No apparent distress      [] Abnormal-   Mental status  [x] Alert and awake  [x] Oriented to person/place/time [x]Able to follow commands      Eyes:  EOM    [x]  Normal  [] Abnormal-  Sclera  [x]  Normal  [] Abnormal -         Discharge [x]  None visible  [] Abnormal -    HENT:   [x] Normocephalic, atraumatic. [] Abnormal   [] Mouth/Throat: Mucous membranes are moist.     External Ears [] Normal  [] Abnormal-     Neck: [] No visualized mass     Pulmonary/Chest: [x] Respiratory effort normal.  [x] No visualized signs of difficulty breathing or respiratory distress        [] Abnormal-      Musculoskeletal:   [] Normal gait with no signs of ataxia         [] Normal range of motion of neck        [] Abnormal-       Neurological:        [x] No Facial Asymmetry (Cranial nerve 7 motor function) (limited exam to video visit)          [x] No gaze palsy        [] Abnormal-         Skin:        [x] No significant exanthematous lesions or discoloration noted on facial skin         [] Abnormal-            Psychiatric:       [x] Normal Affect [x] No Hallucinations        [] Abnormal-     Other pertinent observable physical exam findings-     ASSESSMENT/PLAN:  1. Insomnia, unspecified type  Taking Trazodone as directed. States that insomnia has improved. Completed sleep study last week and was diagnosed with sleep apnea. Will get his mask today. 2. Attention deficit hyperactivity disorder (ADHD), unspecified ADHD type  He is no longer taking Strattera for ADHD. He states that he stopped taking it in July, and feels fine without it. He states that sometimes he has trouble focusing, but has learned how to deal with it without taking medication. He will f/u as needed. 3. Smoking history  Pt completed Chantix in July and successfully quit smoking! Congratulated pt on success! Return in about 1 year (around 11/17/2021) for Routine office visit, Annual Physical.    Faustina Nava is a 36 y.o. male being evaluated by a Virtual Visit (video visit) encounter to address concerns as mentioned above.   A caregiver was present when appropriate. Due to this being a TeleHealth encounter (During [facility]-15 public health emergency), evaluation of the following organ systems was limited: Vitals/Constitutional/EENT/Resp/CV/GI//MS/Neuro/Skin/Heme-Lymph-Imm. Pursuant to the emergency declaration under the [address] [place] [address], [address] [place] [address] and the [employer] and [employer] Act, this Virtual Visit was conducted with patient's (and/or legal guardian's) consent, to reduce the patient's risk of exposure to COVID-19 and provide necessary medical care. The patient (and/or legal guardian) has also been advised to contact this office for worsening conditions or problems, and seek emergency medical treatment and/or call 911 if deemed necessary. Patient identification was verified at the start of the visit: Yes    Total time spent on this encounter: 20 minutes    Services were provided through a video synchronous discussion virtually to substitute for in-person clinic visit. Patient and provider were located at their individual homes. --AMANDA Angeles CNP on 11/17/2020 at 9:01 AM    An electronic signature was used to authenticate this note.

## 2020-11-19 ENCOUNTER — HOSPITAL ENCOUNTER (EMERGENCY)
Age: 40
Discharge: HOME | End: 2020-11-20
Payer: COMMERCIAL

## 2020-11-19 ENCOUNTER — APPOINTMENT (OUTPATIENT)
Dept: GENERAL RADIOLOGY | Age: 40
End: 2020-11-19
Payer: COMMERCIAL

## 2020-11-19 LAB
A/G RATIO: 1.6 (ref 1.1–2.2)
ALBUMIN SERPL-MCNC: 4.5 G/DL (ref 3.4–5)
ALP BLD-CCNC: 91 U/L (ref 40–129)
ALT SERPL-CCNC: 25 U/L (ref 10–40)
ANION GAP SERPL CALCULATED.3IONS-SCNC: 11 MMOL/L (ref 3–16)
AST SERPL-CCNC: 21 U/L (ref 15–37)
BASOPHILS ABSOLUTE: 0.1 K/UL (ref 0–0.2)
BASOPHILS RELATIVE PERCENT: 0.7 %
BILIRUB SERPL-MCNC: 0.4 MG/DL (ref 0–1)
BUN BLDV-MCNC: 14 MG/DL (ref 7–20)
CALCIUM SERPL-MCNC: 10 MG/DL (ref 8.3–10.6)
CHLORIDE BLD-SCNC: 100 MMOL/L (ref 99–110)
CO2: 26 MMOL/L (ref 21–32)
CREAT SERPL-MCNC: 1.1 MG/DL (ref 0.9–1.3)
EOSINOPHILS ABSOLUTE: 0.3 K/UL (ref 0–0.6)
EOSINOPHILS RELATIVE PERCENT: 2.3 %
GFR AFRICAN AMERICAN: >60
GFR NON-AFRICAN AMERICAN: >60
GLOBULIN: 2.9 G/DL
GLUCOSE BLD-MCNC: 128 MG/DL (ref 70–99)
HCT VFR BLD CALC: 45.2 % (ref 40.5–52.5)
HEMOGLOBIN: 15.4 G/DL (ref 13.5–17.5)
LYMPHOCYTES ABSOLUTE: 3 K/UL (ref 1–5.1)
LYMPHOCYTES RELATIVE PERCENT: 25.3 %
MCH RBC QN AUTO: 29.4 PG (ref 26–34)
MCHC RBC AUTO-ENTMCNC: 34.1 G/DL (ref 31–36)
MCV RBC AUTO: 86 FL (ref 80–100)
MONOCYTES ABSOLUTE: 1.2 K/UL (ref 0–1.3)
MONOCYTES RELATIVE PERCENT: 9.8 %
NEUTROPHILS ABSOLUTE: 7.3 K/UL (ref 1.7–7.7)
NEUTROPHILS RELATIVE PERCENT: 61.9 %
PDW BLD-RTO: 14.4 % (ref 12.4–15.4)
PLATELET # BLD: 213 K/UL (ref 135–450)
PMV BLD AUTO: 8.8 FL (ref 5–10.5)
POTASSIUM SERPL-SCNC: 4 MMOL/L (ref 3.5–5.1)
RBC # BLD: 5.25 M/UL (ref 4.2–5.9)
SODIUM BLD-SCNC: 137 MMOL/L (ref 136–145)
TOTAL PROTEIN: 7.4 G/DL (ref 6.4–8.2)
TROPONIN: <0.01 NG/ML
WBC # BLD: 11.9 K/UL (ref 4–11)

## 2020-11-19 PROCEDURE — C9113 INJ PANTOPRAZOLE SODIUM, VIA: HCPCS | Performed by: NURSE PRACTITIONER

## 2020-11-19 PROCEDURE — 84484 ASSAY OF TROPONIN QUANT: CPT

## 2020-11-19 PROCEDURE — 71045 X-RAY EXAM CHEST 1 VIEW: CPT

## 2020-11-19 PROCEDURE — 96374 THER/PROPH/DIAG INJ IV PUSH: CPT

## 2020-11-19 PROCEDURE — 6370000000 HC RX 637 (ALT 250 FOR IP): Performed by: NURSE PRACTITIONER

## 2020-11-19 PROCEDURE — 85025 COMPLETE CBC W/AUTO DIFF WBC: CPT

## 2020-11-19 PROCEDURE — 80053 COMPREHEN METABOLIC PANEL: CPT

## 2020-11-19 PROCEDURE — 6360000002 HC RX W HCPCS: Performed by: NURSE PRACTITIONER

## 2020-11-19 PROCEDURE — 99284 EMERGENCY DEPT VISIT MOD MDM: CPT

## 2020-11-19 PROCEDURE — 93005 ELECTROCARDIOGRAM TRACING: CPT | Performed by: EMERGENCY MEDICINE

## 2020-11-19 RX ORDER — PANTOPRAZOLE SODIUM 40 MG/10ML
40 INJECTION, POWDER, LYOPHILIZED, FOR SOLUTION INTRAVENOUS ONCE
Status: COMPLETED | OUTPATIENT
Start: 2020-11-19 | End: 2020-11-19

## 2020-11-19 RX ADMIN — LIDOCAINE HYDROCHLORIDE: 20 SOLUTION ORAL; TOPICAL at 23:27

## 2020-11-19 RX ADMIN — PANTOPRAZOLE SODIUM 40 MG: 40 INJECTION, POWDER, FOR SOLUTION INTRAVENOUS at 23:27

## 2020-11-19 ASSESSMENT — PAIN DESCRIPTION - LOCATION: LOCATION: CHEST

## 2020-11-19 ASSESSMENT — PAIN SCALES - GENERAL
PAINLEVEL_OUTOF10: 7
PAINLEVEL_OUTOF10: 1

## 2020-11-19 ASSESSMENT — PAIN DESCRIPTION - PAIN TYPE: TYPE: ACUTE PAIN

## 2020-11-19 ASSESSMENT — PAIN DESCRIPTION - DESCRIPTORS: DESCRIPTORS: ACHING

## 2020-11-19 ASSESSMENT — PAIN DESCRIPTION - ORIENTATION: ORIENTATION: MID

## 2020-11-20 VITALS
WEIGHT: 230 LBS | HEIGHT: 71 IN | DIASTOLIC BLOOD PRESSURE: 64 MMHG | HEART RATE: 90 BPM | TEMPERATURE: 97.9 F | RESPIRATION RATE: 24 BRPM | BODY MASS INDEX: 32.2 KG/M2 | OXYGEN SATURATION: 97 % | SYSTOLIC BLOOD PRESSURE: 116 MMHG

## 2020-11-20 LAB
EKG ATRIAL RATE: 98 BPM
EKG DIAGNOSIS: NORMAL
EKG P AXIS: 54 DEGREES
EKG P-R INTERVAL: 146 MS
EKG Q-T INTERVAL: 358 MS
EKG QRS DURATION: 92 MS
EKG QTC CALCULATION (BAZETT): 457 MS
EKG R AXIS: 70 DEGREES
EKG T AXIS: 51 DEGREES
EKG VENTRICULAR RATE: 98 BPM

## 2020-11-20 PROCEDURE — 93010 ELECTROCARDIOGRAM REPORT: CPT | Performed by: INTERNAL MEDICINE

## 2020-11-20 RX ORDER — PANTOPRAZOLE SODIUM 20 MG/1
40 TABLET, DELAYED RELEASE ORAL DAILY
Qty: 30 TABLET | Refills: 0 | Status: SHIPPED | OUTPATIENT
Start: 2020-11-20 | End: 2022-02-24 | Stop reason: CLARIF

## 2020-11-20 RX ORDER — SUCRALFATE ORAL 1 G/10ML
1 SUSPENSION ORAL 4 TIMES DAILY
Qty: 1200 ML | Refills: 3 | Status: SHIPPED | OUTPATIENT
Start: 2020-11-20 | End: 2022-02-24 | Stop reason: CLARIF

## 2020-11-20 NOTE — ED PROVIDER NOTES
Attending EKG Interpretation:  EKG read in the absence of a cardiologist. I was asked to review the patient's EKG by the mid-level provider. I did not see or examine the patient. Discussed findings with mid-level provider.    ED Course as of Nov 20 0202   Thu Nov 19, 2020   2240 EKG interpretation by me: Normal sinus rhythm, rate 98, QTc 457, normal axis, no ST segment or T wave changes indicative of acute ischemia   EKG 12 Lead [SY]      ED Course User Index  [SY] DO Donna Lucero DO  11/20/20 0202

## 2020-11-20 NOTE — ED NOTES
Blood work collected, labeled, and sent to lab      Clarice Agudelo, [address] [place] [address]  11/19/20 8012

## 2020-11-20 NOTE — ED PROVIDER NOTES
Evaluated by [id number] [facility] Provider    [facility]  ED      CHIEF COMPLAINT  Chest Pain (recent covid exposure; chest pain mid chest to left shakira eof neck; )    HISTORY OF PRESENT ILLNESS  Charles Pope is a 36 y.o. male who presents to the ED complaining of chest tightness. Reports chest tightness that started this morning around 7 am. Is making his left arm feel like it's asleep. Tonight he went to bed, took his trazodone and was later woken up from a deep sleep due to the pain. Felt like juana was having spasms in his chest. Left arm still felt asleep. Pain has been constant since starting. Pain is located in the left chest wall. Pain does radiate up into the clavicle area. He thought it was indigestion and has been taking Tums. When he tires to take a deep breath it is hard. Denies associated nausea, vomiting, dizzy or light headedness, diaphoresis. Denies abdominal pain. Denies cough. Denies recent fevers, chills, sweats, illness. His in laws have COVID. He was around them Sunday. Just found out today their test was positive. Does not use smokeless tobacco any more. Recovering alcoholic, sober for a year. 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 Denies illicit drug use. Denies alleviating or aggravating factors. Pain started when he was sitting at his desk. The patient is currently rating their pain as 6/10 and describes it as a pressure type of pain. Risk factors include: -CAD, -FH, +HTN, -HLD, -DM. Former smoker, quit in July of this year. The patient does not have a history of a blood clot in the past.   The patient does not have a history of surgery or trauma in the past 4 weeks. The patient denies cough/hemoptysis. The patient does not take any estrogen supplements. There is no report of unilateral leg swelling. The patient denies recent travel. Treatments tried prior to arrival in the ED include: Tums.     The patient denies other complaints, modifying factors or associated symptoms. The patient arrived to the ED via private car. PAST MEDICAL HISTORY    Past Medical History:   Diagnosis Date    AA (alcohol abuse)     Depression        SURGICAL HISTORY    Past Surgical History:   Procedure Laterality Date    HERNIA REPAIR         CURRENT MEDICATIONS    Current Outpatient Rx   Medication Sig Dispense Refill    pantoprazole (PROTONIX) 20 MG tablet Take 2 tablets by mouth daily 30 tablet 0    sucralfate (CARAFATE) 1 GM/10ML suspension Take 10 mLs by mouth 4 times daily 1200 mL 3    traZODone (DESYREL) 100 MG tablet TAKE 1 TABLET BY MOUTH EVERY DAY AT NIGHT 30 tablet 2       ALLERGIES    No Known Allergies    FAMILY HISTORY    Family History   Problem Relation Age of Onset    Alcohol Abuse Maternal Grandfather     Heart Attack Paternal Grandfather     Alcohol Abuse Paternal Grandfather        SOCIAL HISTORY    Social History     Socioeconomic History    Marital status: Single     Spouse name: None    Number of children: 3    Years of education: None    Highest education level: None   Occupational History    None   Social Needs    Financial resource strain: Not hard at all   [employer] insecurity     Worry: Never true     Inability: Never true    Transportation needs     Medical: No     Non-medical: No   Tobacco Use    Smoking status: Former Smoker     Packs/day: 1.00     Years: 14.00     Pack years: 14.00     Start date: 1995     Last attempt to quit: 2020     Years since quittin.3    Smokeless tobacco: Former User     Types: Chew   Substance and Sexual Activity    Alcohol use: Not Currently     Comment: recovering alcoholic     Drug use: Not Currently     Types: Methamphetamines     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    Sexual activity: Yes     Partners: Female   Lifestyle    Physical activity     Days per week: 0 days     Minutes per session: None    Stress:  Only a little   Relationships    Social connections     Talks on phone: None Gets together: None     Attends [employer] service: None     Active member of club or organization: None     Attends meetings of clubs or organizations: None     Relationship status: None    Intimate partner violence     Fear of current or ex partner: None     Emotionally abused: None     Physically abused: None     Forced sexual activity: None   Other Topics Concern    None   Social History Narrative    None       REVIEW OF SYSTEMS    10 systems reviewed, pertinent positives per HPI otherwise noted to be negative    PHYSICAL EXAM  Vitals:    11/20/20 0031   BP: 116/64   Pulse: 90   Resp: 24   Temp:    SpO2: 97%     GENERAL: Patient is well-developed, well-nourished. Awake and alert. Cooperative. Resting in bed. No apparent distress. HEENT:  Normocephalic, atraumatic. Conjunctiva appear normal. Sclera is non-icteric. External ears are normal.    NECK: Supple with normal ROM. Trachea midline. LUNGS: Equal and symmetric chest rise. Breathing is unlabored. Speaking comfortably in full sentences. Lungs are clear bilaterally to auscultation. Without wheezing, rales, or rhonchi. CADIOVASCULAR:  Regular rate and rhythm. Normal S1-S2 sounds. No murmurs, rubs, or gallops. Capillary refill is brisk in all 4 extremities. Bilateral lower extremities are equal in size, there is no swelling observed. There is no tenderness to palpation. There is no erythema observed or warmth palpated. GI: Soft, nontender, nondistended with positive bowel sounds. No rebound tenderness, guarding or any peritoneal signs. No masses or hepatosplenomegaly    MUSCULOSKELETAL:  No gross deformities or trauma noted. Moving all extremities equally and appropriately. Normal ROM. SKIN: Warm/dry. Skin is intact. No rashes/lesions noted. PSYCHIATRIC: Mood and affect appropriate. Speech is clear and articulate. NEUROLOGIC: Alert and oriented. No focal motor or sensory deficits. LABS  I have reviewed all labs for this visit.    Results for orders placed or performed during the hospital encounter of 11/19/20   CBC Auto Differential   Result Value Ref Range    WBC 11.9 (H) 4.0 - 11.0 K/uL    RBC 5.25 4.20 - 5.90 M/uL    Hemoglobin 15.4 13.5 - 17.5 g/dL    Hematocrit 45.2 40.5 - 52.5 %    MCV 86.0 80.0 - 100.0 fL    MCH 29.4 26.0 - 34.0 pg    MCHC 34.1 31.0 - 36.0 g/dL    RDW 14.4 12.4 - 15.4 %    Platelets 792 214 - 421 K/uL    MPV 8.8 5.0 - 10.5 fL    Neutrophils % 61.9 %    Lymphocytes % 25.3 %    Monocytes % 9.8 %    Eosinophils % 2.3 %    Basophils % 0.7 %    Neutrophils Absolute 7.3 1.7 - 7.7 K/uL    Lymphocytes Absolute 3.0 1.0 - 5.1 K/uL    Monocytes Absolute 1.2 0.0 - 1.3 K/uL    Eosinophils Absolute 0.3 0.0 - 0.6 K/uL    Basophils Absolute 0.1 0.0 - 0.2 K/uL   Comprehensive Metabolic Panel   Result Value Ref Range    Sodium 137 136 - 145 mmol/L    Potassium 4.0 3.5 - 5.1 mmol/L    Chloride 100 99 - 110 mmol/L    CO2 26 21 - 32 mmol/L    Anion Gap 11 3 - 16    Glucose 128 (H) 70 - 99 mg/dL    BUN 14 7 - 20 mg/dL    CREATININE 1.1 0.9 - 1.3 mg/dL    GFR Non-African American >60 >60    GFR African American >60 >60    Calcium 10.0 8.3 - 10.6 mg/dL    Total Protein 7.4 6.4 - 8.2 g/dL    Alb 4.5 3.4 - 5.0 g/dL    Albumin/Globulin Ratio 1.6 1.1 - 2.2    Total Bilirubin 0.4 0.0 - 1.0 mg/dL    Alkaline Phosphatase 91 40 - 129 U/L    ALT 25 10 - 40 U/L    AST 21 15 - 37 U/L    Globulin 2.9 g/dL   Troponin   Result Value Ref Range    Troponin <0.01 <0.01 ng/mL   EKG 12 Lead   Result Value Ref Range    Ventricular Rate 98 BPM    Atrial Rate 98 BPM    P-R Interval 146 ms    QRS Duration 92 ms    Q-T Interval 358 ms    QTc Calculation (Bazett) 457 ms    P Axis 54 degrees    R Axis 70 degrees    T Axis 51 degrees    Diagnosis       Normal sinus rhythmNormal ECGNo previous ECGs available       RADIOLOGY    Xr Chest Portable    Result Date: 11/19/2020  EXAMINATION: ONE XRAY VIEW OF THE CHEST 11/19/2020 10:20 pm COMPARISON: None.  HISTORY: ORDERING SYSTEM PROVIDED HISTORY: chest pain TECHNOLOGIST PROVIDED HISTORY: Reason for exam:->chest pain FINDINGS: Borderline cardiomegaly. Cardiomediastinal silhouette otherwise within normal limits. Linear lung markings adjacent to the heart apex and at the right lung base. No acute consolidation. No effusion, pneumothorax or subdiaphragmatic free air. No acute osseous abnormality identified. Minimal bibasilar atelectasis or scarring. No acute consolidation. PERC Rule:  Applicable in this patient who has low clinical suspicion for pulmonary embolism. Age < 48years old: Yes  Heart rate < 100 bpm: Yes  Oxygen saturation > 95%: Yes  Hemoptysis: No  Exogenous estrogen use: No  Prior history of DVT or PE: No  Unilateral leg swelling: No  Surgery or significant trauma in the past 4 weeks: No    Based on the above, PE can effectively be ruled out without further testing. PERC RuleThe patient meets all PERC criteria and has a low-risk Well's score, indicating very low risk of PE. The risks of further investigation, including a large dose of radiation and/or unnecessary treatment with anticoagulant therapy, outweigh the risk of a clinically significant PE. Thus, neither a D-dimer nor a CTA of the pulmonary arteries are indicated. HEART SCORE:    History: +0 for low suspicion  EKG: +0 for normal EKG   Age: [de-identified] for age <45 years  Risk factors (includes HLD, HTN, DM, tobacco use, obesity, and +FHx): +1 for 1-2 risk factors  Initial troponin: +0 for negative troponin    Heart score: 1. This falls under the following category: Score of 0-3, which indicates a very low risk for major adverse cardiac event and supports early discharge    ED COURSE/MDM  Patient seen and evaluated. Old records reviewed. Diagnostic testing reviewed and results discussed. I have evaluated this patient. My supervising physician was available for consultation. Nisreen Louise presented to the ED today with above noted complaints.   On arrival to the ED the patient is afebrile and hemodynamically stable. He is well saturated on room air. Physical exam unremarkable and without reproducible chest tenderness to palpation. Blood work does show leukocytosis is WBC elevated at 11.9, no differential shift. No anemia. No electrolyte abnormality. No evidence of acute kidney injury or transaminitis. Troponin is negative. EKG is without acute findings. Chest x-ray is without acute findings. Patient has a heart score of 1. Given the fact that he has had pain that started at 7 AM and it is been constant throughout the day with a negative EKG and troponin in the ED in the setting of a low heart score I feel cardiac ischemia as cause to his complaints is low. I also feel that PE as cause to his complaints is low risk as the patient does not have risk factors for PE and he is PERC negative. Patient was given Protonix and a GI cocktail. Upon reevaluation the patient reported improvement in his symptoms. I feel that most likely the cause of his symptoms is GI. He will be discharged with prescription for Protonix and Carafate and was advised to follow-up with primary care provider. Pt was given the following medications or treatments in the ED:   Medications   pantoprazole (PROTONIX) injection 40 mg (40 mg Intravenous Given 11/19/20 2327)   aluminum & magnesium hydroxide-simethicone (MAALOX) 30 mL, lidocaine viscous hcl (XYLOCAINE) 5 mL (GI COCKTAIL) ( Oral Given 11/19/20 2327)       At this point I do not feel the patient requires further work up and it is reasonable to discharge the patient. Please refer to AVS for further details regarding discharge instructions. A discussion was had with the patient regarding diagnosis, diagnostic testing results, treatment/ plan of care, and follow up. All questions were answered. Patient will follow up as directed for further evaluation/treatment.  The patient was given strict return precautions as we discussed symptoms that would necessitate return to the ED. Patient will return to ED for new/worsening symptoms. The patient verbalized their understanding and agreement with the above plan. I estimate there is LOW risk for ACUTE CORONARY SYNDROME, INTRACRANIAL HEMORRHAGE, MALIGNANT DYSRHYTHMIA or HYPERTENSION, PULMONARY EMBOLISM, SEPSIS, SUBARACHNOID HEMORRHAGE, SUBDURAL HEMATOMA, STROKE, or THORACIC AORTIC DISSECTION, thus I consider the discharge disposition reasonable. Yohan Corea and I have discussed the diagnosis and risks, and we agree with discharging home to follow-up with their primary doctor. We also discussed returning to the Emergency Department immediately if new or worsening symptoms occur. We have discussed the symptoms which are most concerning (e.g., bloody sputum, fever, worsening pain or shortness of breath, vomiting, weakness) that necessitate immediate return. Patient was sent home with a prescription for below medication/s. I did [place] patient on appropriate use of these medication. Discharge Medication List as of 11/20/2020 12:28 AM      START taking these medications    Details   pantoprazole (PROTONIX) 20 MG tablet Take 2 tablets by mouth daily, Disp-30 tablet,R-0Print      sucralfate (CARAFATE) 1 GM/10ML suspension Take 10 mLs by mouth 4 times daily, Disp-1200 mL,R-3Print             CLINICAL IMPRESSION    1. Chest pain, unspecified type    2. Shortness of breath           Discharge Vitals:  Blood pressure 116/64, pulse 90, temperature 97.9 °F (36.6 °C), temperature source Oral, resp. rate 24, height 5' 11\" (1.803 m), weight 230 lb (104.3 kg), SpO2 97 %.     FOLLOW UP  AMANDA Hernandez - CNP  [facility] Dylan Maryann [address] [place] [id number]  [phone]    Call in 1 day  For further evaluation    [facility]  ED  [address] [facility] [address]  Go to   If symptoms worsen      DISPOSITION  Patient was discharged to home in good condition. Comment: Please note this report has been produced using speech recognition software and may contain errors related to that system including errors in grammar, punctuation, and spelling, as well as words and phrases that may be inappropriate. If there are any questions or concerns please feel free to contact the dictating provider for clarification.         AMANDA Bergman - CNP  11/20/20 [id number]

## 2020-12-02 ENCOUNTER — OFFICE VISIT (OUTPATIENT)
Dept: PRIMARY CARE CLINIC | Age: 40
End: 2020-12-02
Payer: COMMERCIAL

## 2020-12-02 PROCEDURE — G8428 CUR MEDS NOT DOCUMENT: HCPCS | Performed by: NURSE PRACTITIONER

## 2020-12-02 PROCEDURE — 99211 OFF/OP EST MAY X REQ PHY/QHP: CPT | Performed by: NURSE PRACTITIONER

## 2020-12-02 PROCEDURE — G8417 CALC BMI ABV UP PARAM F/U: HCPCS | Performed by: NURSE PRACTITIONER

## 2020-12-02 NOTE — PATIENT INSTRUCTIONS

## 2020-12-02 NOTE — PROGRESS NOTES
Raul Diallo received a viral test for COVID-19. They were educated on isolation and quarantine as appropriate. For any symptoms, they were directed to seek care from their PCP, given contact information to establish with a doctor, directed to an urgent care or the emergency room.

## 2020-12-04 LAB — SARS-COV-2, NAA: NOT DETECTED

## 2020-12-10 ENCOUNTER — NURSE TRIAGE (OUTPATIENT)
Dept: OTHER | Facility: CLINIC | Age: 40
End: 2020-12-10

## 2020-12-10 ENCOUNTER — VIRTUAL VISIT (OUTPATIENT)
Dept: FAMILY MEDICINE CLINIC | Age: 40
End: 2020-12-10
Payer: COMMERCIAL

## 2020-12-10 DIAGNOSIS — R73.9 HYPERGLYCEMIA: ICD-10-CM

## 2020-12-10 DIAGNOSIS — R07.9 CHEST PAIN, UNSPECIFIED TYPE: ICD-10-CM

## 2020-12-10 PROCEDURE — G8427 DOCREV CUR MEDS BY ELIG CLIN: HCPCS | Performed by: FAMILY MEDICINE

## 2020-12-10 PROCEDURE — 99214 OFFICE O/P EST MOD 30 MIN: CPT | Performed by: FAMILY MEDICINE

## 2020-12-10 PROCEDURE — 93000 ELECTROCARDIOGRAM COMPLETE: CPT | Performed by: FAMILY MEDICINE

## 2020-12-10 ASSESSMENT — ENCOUNTER SYMPTOMS
NAUSEA: 1
SHORTNESS OF BREATH: 1

## 2020-12-10 NOTE — TELEPHONE ENCOUNTER
Reason for Disposition   Chest pain lasting longer than 5 minutes and occurred in last 3 days (72 hours) (Exception: feels exactly the same as previously diagnosed heartburn and has accompanying sour taste in mouth)    Answer Assessment - Initial Assessment Questions  Went to the ED 2 weeks ago because he thought he was having a heart attack. Starting take anti-acids. Feels like a knife is going through his heart. His arm goes numb. Lasts 5-7 minutes then goes away. He gets SOB. Woke him up in a dead sleep last night. 1. LOCATION: \"Where does it hurt? \"        His heart. When lying on right side it hurts worse. When he starts to experience it he has to get into a up right sitting position. 2. RADIATION: \"Does the pain go anywhere else? \" (e.g., into neck, jaw, arms, back)      Arm goes numb. 3. ONSET: \"When did the chest pain begin? \" (Minutes, hours or days)       Started right before thanksgiving. Went to the ED. Started back up this past week. 4. PATTERN \"Does the pain come and go, or has it been constant since it started? \"  \"Does it get worse with exertion? \"       Comes and goes     5. DURATION: \"How long does it last\" (e.g., seconds, minutes, hours)      5-7 minutes     6. SEVERITY: \"How bad is the pain? \"  (e.g., Scale 1-10; mild, moderate, or severe)     - MILD (1-3): doesn't interfere with normal activities      - MODERATE (4-7): interferes with normal activities or awakens from sleep     - SEVERE (8-10): excruciating pain, unable to do any normal activities        10/10 when it happens. 7. CARDIAC RISK FACTORS: \"Do you have any history of heart problems or risk factors for heart disease? \" (e.g., angina, prior heart attack; diabetes, high blood pressure, high cholesterol, smoker, or strong family history of heart disease)      Family history of heart disease. Grandfather had a heart attack. 8. PULMONARY RISK FACTORS: \"Do you have any history of lung disease? \"  (e.g., blood clots in lung, asthma, emphysema, birth control pills)      Both his grandfathers  of lung cancer. 9. CAUSE: \"What do you think is causing the chest pain? \"      ED stated it was acid reflux but he is concerned about his heart. 10. OTHER SYMPTOMS: \"Do you have any other symptoms? \" (e.g., dizziness, nausea, vomiting, sweating, fever, difficulty breathing, cough)        Shortness of breath when he feels the pain. 11. PREGNANCY: \"Is there any chance you are pregnant? \" \"When was your last menstrual period? \"        No    Protocols used: CHEST PAIN-ADULT-[place]    2nd level [place]. [id number] Diane Chatterjee for caller to go to PCP. See today. Thank you for allowing me to participate in the care of your patient. The patient was connected to triage in response to information provided to the [facility]. Please do not respond through this encounter as the response is not directed to a shared pool.

## 2020-12-10 NOTE — PROGRESS NOTES
Florinda Soulier is a 36 y.o. male    Chief Complaint   Patient presents with    Chest Pain     left arm numb       HPI:    This is a new patient to me. This is a video visit. Consent has been obtained. The patient is at work. Chest Pain    This is a new problem. The current episode started today. The onset quality is sudden. The problem occurs intermittently. The problem has been resolved. The pain is present in the substernal region. The quality of the pain is described as sharp. The pain radiates to the left jaw and left arm. Associated symptoms include nausea, numbness and shortness of breath. Pertinent negatives include no fever. The pain is aggravated by nothing. He has tried nothing for the symptoms. Risk factors include obesity. His past medical history is significant for sleep apnea. His family medical history is significant for early MI.       ROS:    Review of Systems   Constitutional: Negative for fever. Respiratory: Positive for shortness of breath. Cardiovascular: Positive for chest pain. Gastrointestinal: Positive for nausea. Neurological: Positive for numbness. There were no vitals taken for this visit. Physical Exam:    Physical Exam  Constitutional:       General: He is not in acute distress. Appearance: Normal appearance. He is obese. He is not ill-appearing or toxic-appearing. HENT:      Head: Normocephalic. Neurological:      Mental Status: He is alert. Psychiatric:         Mood and Affect: Mood normal.         Behavior: Behavior normal.         Thought Content:  Thought content normal.         Current Outpatient Medications   Medication Sig Dispense Refill    pantoprazole (PROTONIX) 20 MG tablet Take 2 tablets by mouth daily 30 tablet 0    traZODone (DESYREL) 100 MG tablet TAKE 1 TABLET BY MOUTH EVERY DAY AT NIGHT 30 tablet 2    sucralfate (CARAFATE) 1 GM/10ML suspension Take 10 mLs by mouth 4 times daily (Patient not taking: Reported on 12/10/2020) 1200 mL 3     No current facility-administered medications for this visit. Assessment:    1. Chest pain, unspecified type    2. Hyperglycemia        Plan:     1. Chest pain, unspecified type  EKG was normal sinus rhythm. Discussed how his symptoms do seem concerning for true cardiac disease. If they did recur, he should go to the emergency department immediately. An understanding of the plan was verbalized. We will check labs below and a stress test.  Encouraged to start an aspirin 81 mg. Continue wearing the CPAP at night.  - ECHO Stress Test; Future  - CBC Auto Differential; Future  - Comprehensive Metabolic Panel; Future  - Lipid Panel; Future  - TSH with Reflex; Future  - Troponin; Future  - D-DIMER, QUANTITATIVE; Future  - EKG 12 Lead    2. Hyperglycemia  - Hemoglobin A1C; Future      Patient to return to clinic if symptoms worsen or fail to improve.

## 2020-12-11 ENCOUNTER — APPOINTMENT (OUTPATIENT)
Dept: CT IMAGING | Age: 40
End: 2020-12-11
Payer: COMMERCIAL

## 2020-12-11 ENCOUNTER — HOSPITAL ENCOUNTER (EMERGENCY)
Age: 40
Discharge: HOME | End: 2020-12-11
Payer: COMMERCIAL

## 2020-12-11 VITALS
WEIGHT: 230 LBS | BODY MASS INDEX: 32.2 KG/M2 | SYSTOLIC BLOOD PRESSURE: 132 MMHG | HEART RATE: 78 BPM | RESPIRATION RATE: 16 BRPM | TEMPERATURE: 98.7 F | DIASTOLIC BLOOD PRESSURE: 74 MMHG | HEIGHT: 71 IN | OXYGEN SATURATION: 98 %

## 2020-12-11 LAB
A/G RATIO: 1.3 (ref 1.1–2.2)
A/G RATIO: 1.6 (ref 1.1–2.2)
ALBUMIN SERPL-MCNC: 4.5 G/DL (ref 3.4–5)
ALBUMIN SERPL-MCNC: 4.6 G/DL (ref 3.4–5)
ALP BLD-CCNC: 104 U/L (ref 40–129)
ALP BLD-CCNC: 87 U/L (ref 40–129)
ALT SERPL-CCNC: 18 U/L (ref 10–40)
ALT SERPL-CCNC: 26 U/L (ref 10–40)
ANION GAP SERPL CALCULATED.3IONS-SCNC: 10 MMOL/L (ref 3–16)
ANION GAP SERPL CALCULATED.3IONS-SCNC: 12 MMOL/L (ref 3–16)
AST SERPL-CCNC: 18 U/L (ref 15–37)
AST SERPL-CCNC: 36 U/L (ref 15–37)
BASOPHILS ABSOLUTE: 0 K/UL (ref 0–0.2)
BASOPHILS ABSOLUTE: 0 K/UL (ref 0–0.2)
BASOPHILS RELATIVE PERCENT: 0.4 %
BASOPHILS RELATIVE PERCENT: 0.5 %
BILIRUB SERPL-MCNC: 0.3 MG/DL (ref 0–1)
BILIRUB SERPL-MCNC: 0.4 MG/DL (ref 0–1)
BUN BLDV-MCNC: 11 MG/DL (ref 7–20)
BUN BLDV-MCNC: 11 MG/DL (ref 7–20)
CALCIUM SERPL-MCNC: 9.4 MG/DL (ref 8.3–10.6)
CALCIUM SERPL-MCNC: 9.7 MG/DL (ref 8.3–10.6)
CHLORIDE BLD-SCNC: 100 MMOL/L (ref 99–110)
CHLORIDE BLD-SCNC: 101 MMOL/L (ref 99–110)
CHOLESTEROL, TOTAL: 224 MG/DL (ref 0–199)
CO2: 26 MMOL/L (ref 21–32)
CO2: 27 MMOL/L (ref 21–32)
CREAT SERPL-MCNC: 0.9 MG/DL (ref 0.9–1.3)
CREAT SERPL-MCNC: 1 MG/DL (ref 0.9–1.3)
D DIMER: 297 NG/ML DDU (ref 0–229)
EKG ATRIAL RATE: 88 BPM
EKG DIAGNOSIS: NORMAL
EKG P AXIS: 55 DEGREES
EKG P-R INTERVAL: 146 MS
EKG Q-T INTERVAL: 370 MS
EKG QRS DURATION: 106 MS
EKG QTC CALCULATION (BAZETT): 447 MS
EKG R AXIS: 72 DEGREES
EKG T AXIS: 54 DEGREES
EKG VENTRICULAR RATE: 88 BPM
EOSINOPHILS ABSOLUTE: 0.2 K/UL (ref 0–0.6)
EOSINOPHILS ABSOLUTE: 0.3 K/UL (ref 0–0.6)
EOSINOPHILS RELATIVE PERCENT: 2.8 %
EOSINOPHILS RELATIVE PERCENT: 3.3 %
ESTIMATED AVERAGE GLUCOSE: 108.3 MG/DL
GFR AFRICAN AMERICAN: >60
GFR AFRICAN AMERICAN: >60
GFR NON-AFRICAN AMERICAN: >60
GFR NON-AFRICAN AMERICAN: >60
GLOBULIN: 2.8 G/DL
GLOBULIN: 3.5 G/DL
GLUCOSE BLD-MCNC: 100 MG/DL (ref 70–99)
GLUCOSE BLD-MCNC: 109 MG/DL (ref 70–99)
HBA1C MFR BLD: 5.4 %
HCT VFR BLD CALC: 43.9 % (ref 40.5–52.5)
HCT VFR BLD CALC: 44 % (ref 40.5–52.5)
HDLC SERPL-MCNC: 26 MG/DL (ref 40–60)
HEMOGLOBIN: 14.9 G/DL (ref 13.5–17.5)
HEMOGLOBIN: 15 G/DL (ref 13.5–17.5)
LDL CHOLESTEROL CALCULATED: ABNORMAL MG/DL
LDL CHOLESTEROL DIRECT: 112 MG/DL
LYMPHOCYTES ABSOLUTE: 2.2 K/UL (ref 1–5.1)
LYMPHOCYTES ABSOLUTE: 2.7 K/UL (ref 1–5.1)
LYMPHOCYTES RELATIVE PERCENT: 26.3 %
LYMPHOCYTES RELATIVE PERCENT: 28.4 %
MCH RBC QN AUTO: 28.7 PG (ref 26–34)
MCH RBC QN AUTO: 29.2 PG (ref 26–34)
MCHC RBC AUTO-ENTMCNC: 33.8 G/DL (ref 31–36)
MCHC RBC AUTO-ENTMCNC: 34.2 G/DL (ref 31–36)
MCV RBC AUTO: 84.9 FL (ref 80–100)
MCV RBC AUTO: 85.3 FL (ref 80–100)
MONOCYTES ABSOLUTE: 0.6 K/UL (ref 0–1.3)
MONOCYTES ABSOLUTE: 0.8 K/UL (ref 0–1.3)
MONOCYTES RELATIVE PERCENT: 7.3 %
MONOCYTES RELATIVE PERCENT: 8.4 %
NEUTROPHILS ABSOLUTE: 5.4 K/UL (ref 1.7–7.7)
NEUTROPHILS ABSOLUTE: 5.6 K/UL (ref 1.7–7.7)
NEUTROPHILS RELATIVE PERCENT: 59.4 %
NEUTROPHILS RELATIVE PERCENT: 63.2 %
PDW BLD-RTO: 13.8 % (ref 12.4–15.4)
PDW BLD-RTO: 14 % (ref 12.4–15.4)
PLATELET # BLD: 217 K/UL (ref 135–450)
PLATELET # BLD: 230 K/UL (ref 135–450)
PMV BLD AUTO: 8.9 FL (ref 5–10.5)
PMV BLD AUTO: 9.8 FL (ref 5–10.5)
POTASSIUM REFLEX MAGNESIUM: 5.2 MMOL/L (ref 3.5–5.1)
POTASSIUM SERPL-SCNC: 3.8 MMOL/L (ref 3.5–5.1)
PRO-BNP: 8 PG/ML (ref 0–124)
RBC # BLD: 5.15 M/UL (ref 4.2–5.9)
RBC # BLD: 5.17 M/UL (ref 4.2–5.9)
SODIUM BLD-SCNC: 137 MMOL/L (ref 136–145)
SODIUM BLD-SCNC: 139 MMOL/L (ref 136–145)
TOTAL PROTEIN: 7.4 G/DL (ref 6.4–8.2)
TOTAL PROTEIN: 8 G/DL (ref 6.4–8.2)
TRIGL SERPL-MCNC: 639 MG/DL (ref 0–150)
TROPONIN: <0.01 NG/ML
TSH REFLEX: 1.86 UIU/ML (ref 0.27–4.2)
VLDLC SERPL CALC-MCNC: ABNORMAL MG/DL
WBC # BLD: 8.5 K/UL (ref 4–11)
WBC # BLD: 9.5 K/UL (ref 4–11)

## 2020-12-11 PROCEDURE — 6360000004 HC RX CONTRAST MEDICATION: Performed by: PHYSICIAN ASSISTANT

## 2020-12-11 PROCEDURE — 80053 COMPREHEN METABOLIC PANEL: CPT

## 2020-12-11 PROCEDURE — 71260 CT THORAX DX C+: CPT

## 2020-12-11 PROCEDURE — 85025 COMPLETE CBC W/AUTO DIFF WBC: CPT

## 2020-12-11 PROCEDURE — 93005 ELECTROCARDIOGRAM TRACING: CPT | Performed by: EMERGENCY MEDICINE

## 2020-12-11 PROCEDURE — 84484 ASSAY OF TROPONIN QUANT: CPT

## 2020-12-11 PROCEDURE — 93010 ELECTROCARDIOGRAM REPORT: CPT | Performed by: INTERNAL MEDICINE

## 2020-12-11 PROCEDURE — 83880 ASSAY OF NATRIURETIC PEPTIDE: CPT

## 2020-12-11 PROCEDURE — 99285 EMERGENCY DEPT VISIT HI MDM: CPT

## 2020-12-11 PROCEDURE — 2580000003 HC RX 258: Performed by: PHYSICIAN ASSISTANT

## 2020-12-11 RX ORDER — 0.9 % SODIUM CHLORIDE 0.9 %
1000 INTRAVENOUS SOLUTION INTRAVENOUS ONCE
Status: COMPLETED | OUTPATIENT
Start: 2020-12-11 | End: 2020-12-11

## 2020-12-11 RX ADMIN — SODIUM CHLORIDE 1000 ML: 9 INJECTION, SOLUTION INTRAVENOUS at 13:32

## 2020-12-11 RX ADMIN — IOPAMIDOL 75 ML: 755 INJECTION, SOLUTION INTRAVENOUS at 15:03

## 2020-12-11 ASSESSMENT — PAIN SCALES - GENERAL
PAINLEVEL_OUTOF10: 5
PAINLEVEL_OUTOF10: 8

## 2020-12-11 NOTE — ED PROVIDER NOTES
today as well as I have placed order for CT pulmonary study. Nursing Notes were all reviewed and agreed with or any disagreements were addressed in the HPI. REVIEW OF SYSTEMS    (2-9 systems for level 4, 10 or more for level 5)     Review of Systems    Positives and Pertinent negatives as per HPI. Except as noted above in the ROS, all other systems were reviewed and negative. PAST MEDICAL HISTORY     Past Medical History:   Diagnosis Date    AA (alcohol abuse)     Depression          SURGICAL HISTORY     Past Surgical History:   Procedure Laterality Date    HERNIA REPAIR           CURRENTMEDICATIONS       Previous Medications    PANTOPRAZOLE (PROTONIX) 20 MG TABLET    Take 2 tablets by mouth daily    SUCRALFATE (CARAFATE) 1 GM/10ML SUSPENSION    Take 10 mLs by mouth 4 times daily    TRAZODONE (DESYREL) 100 MG TABLET    TAKE 1 TABLET BY MOUTH EVERY DAY AT NIGHT         ALLERGIES     Patient has no known allergies.     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 Constitutional:       Appearance: Normal appearance. He is well-developed and normal weight. HENT:      Head: Normocephalic and atraumatic. Right Ear: External ear normal.      Left Ear: External ear normal.   Eyes:      General: No scleral icterus. Right eye: No discharge. Left eye: No discharge. Conjunctiva/sclera: Conjunctivae normal.   Neck:      Musculoskeletal: Normal range of motion and neck supple. Cardiovascular:      Rate and Rhythm: Normal rate and regular rhythm. Heart sounds: Normal heart sounds. Pulmonary:      Effort: Pulmonary effort is normal.      Breath sounds: Normal breath sounds. Chest:      Chest wall: No tenderness. Abdominal:      General: Abdomen is flat. Bowel sounds are normal.      Palpations: Abdomen is soft. Musculoskeletal: Normal range of motion. Right lower leg: No edema. Left lower leg: No edema. Skin:     General: Skin is warm and dry. Neurological:      General: No focal deficit present. Mental Status: He is alert and oriented to person, place, and time. Mental status is at baseline. Psychiatric:         Mood and Affect: Mood normal.         Behavior: Behavior normal.         Thought Content:  Thought content normal.         Judgment: Judgment normal.         DIAGNOSTIC RESULTS   LABS:    Labs Reviewed   COMPREHENSIVE METABOLIC PANEL W/ REFLEX TO MG FOR LOW K - Abnormal; Notable for the following components:       Result Value    Potassium reflex Magnesium 5.2 (*)     Glucose 109 (*)     All other components within normal limits    Narrative:     Performed at:  [place] [address] [address]   Phone [phone]   CBC WITH AUTO DIFFERENTIAL    Narrative:     Performed at:  [place] [address] [address]   Phone [phone] [place]    Narrative:     Performed at:  [facility]) [address], [facility] [employer] [place]   Phone [phone] PEPT[place]    Narrative:     Performed at:  [facility]) - [address], [address] [place] [address]   Phone [phone]       All other labs were within normal range or not returned as of this dictation. EKG: All EKG's are interpreted by the Emergency Department Physician in the absence of a cardiologist.  Please see their note for interpretation of EKG. RADIOLOGY:   Non-plain film images such as CT, Ultrasound and MRI are read by the radiologist. Plain radiographic images are visualized and preliminarily interpreted by the ED Provider with the below findings:        Interpretation per the Radiologist below, if available at the time of this note:    CT CHEST PULMONARY EMBOLISM W CONTRAST   Final Result   1. No evidence of pulmonary embolism   2. Small pericardial effusion   3. Multifocal atelectasis           No results found. PROCEDURES   Unless otherwise noted below, none     Procedures    CRITICAL CARE TIME   N/A    CONSULTS:  None      EMERGENCY DEPARTMENT COURSE and DIFFERENTIAL DIAGNOSIS/MDM:   Vitals:    Vitals:    12/11/20 1245 12/11/20 1247 12/11/20 1249 12/11/20 1517   BP:   136/75 132/74   Pulse: 98   78   Resp:   16 16   Temp:   98.7 °F (37.1 °C)    TempSrc:   Oral    SpO2: 97%   98%   Weight:  230 lb (104.3 kg)     Height:  5' 11\" (1.803 m)         Patient was given the following medications:  Medications   0.9 % sodium chloride bolus (0 mLs Intravenous Stopped 12/11/20 1502)   iopamidol (ISOVUE-370) 76 % injection 75 mL (75 mLs Intravenous Given 12/11/20 1503)           Patient resenting with complaint chest pain which wakes him at night rarely occurs during the day sharp in character and not constant.  Indicates occasional shortness of breath with the pain and occasional referred pain to neck and jaw. At this time during evaluation asymptomatic. PCP ordered a D-dimer. This was elevated. Chest CTA negative for PE or other acute cardiopulmonary abnormalities. I did place referral to cardiologist. Stress test and/or stress echo and or echocardiography may benefit the patient's evaluation. This will be left up to the discretion of the cardiologist and/or his PCP. The patient is recommended to take aspirin 325 mg daily until cleared by cardiology. The patient does express understanding of his diagnosis and the treatment plan. FINAL IMPRESSION      1. Chest pain, unspecified type          DISPOSITION/PLAN   DISPOSITION        PATIENT REFERREDTO:  Santiago Zacarias, AMANDA - CNP  [address] [place] [id number]  [phone]    Schedule an appointment as soon as possible for a visit on 12/15/2020      [facility]  ED  [address] [facility]  Go to   If symptoms worsen      DISCHARGE MEDICATIONS:  New Prescriptions    No medications on file       DISCONTINUED MEDICATIONS:  Discontinued Medications    No medications on file              (Please note that portions of this note were completed with a voice recognition program.  Efforts were made to edit the dictations but occasionally words are mis-transcribed. )    Maxim Randolph PA-C (electronically signed)           Maxim Randolph PA-C  12/11/20 [id number]

## 2020-12-11 NOTE — ED NOTES
Chest pain left side. Pt states \"I was in here a couple of weeks ago with the same pain/they did blood work and chest pain/they said it may just be indigestion/ they gave me some stuff for indigestion but it not helping/I had some more chest pain x2 days ago on the left side/I was seen at my Dr yesterday they rao blood work he said my D-Dimer was elevated/and I also have been having some SOB I just want to make sure everything is OK\". Tracee MCKENZIE at bedside assessing pt.      Itzel Yao LPN  10/08/17 2148

## 2020-12-11 NOTE — ED PROVIDER NOTES
The Ekg interpreted by me shows  normal sinus rhythm with a rate of 88  Axis is   Normal  QTc is  447  Intervals and Durations are unremarkable.       ST Segments: no acute change  No significant change from prior EKG dated 12/10/20    Interpreted EKG, patient not evaluated        Antoinette De La Rosa MD  12/11/20 4732

## 2020-12-15 NOTE — PROGRESS NOTES
2020    Follow-up (elevated d-dimer), Chest Pain, and Edema (ankles)       TELEHEALTH EVALUATION -- Audio/Visual (During BEGTI-31 public health emergency)    HPI: Chaitanya Redmond is a 36 y.o. male who is having a virtual visit today as a NEW patient consult for cardiology, referred by [facility] ER for chest pain. He presented to the ER on 2020 with chest pain with an D-Dimer elevated. CT chest showed no PE, small pericardial effusion. Today he states he will be having a stress echo on 2020. He states he has been having chest pain that feels like a tightness. Pain radiates into his neck. He states chest pain woke him from sleep he went to the ER for evaluation and was treated for GERD. He states he had another episode of sharp chest pain that woke him from sleep. He took [place] which did not help. A few days later he started to have chest pain at work and was taken to the ER again. He states he has had the pain at work with lifting. The pain doubles him over and takes his breath away. Does not radiate. He has to stop and rest for several minutes before the pain resolves. Patient currently denies any weight gain, edema, palpitations,  dizziness, and syncope. Chaitanya Redmond (:  1980) has requested an audio/video evaluation for the following concern(s): NEW patient for chest pain         [x] Medications and dosages reviewed. ROS:  [x]Full ROS obtained and negative except as mentioned in HPI    Prior to Visit Medications    Medication Sig Taking?  Authorizing Provider   pantoprazole (PROTONIX) 20 MG tablet Take 2 tablets by mouth daily Yes AMANDA Nieto CNP   traZODone (DESYREL) 100 MG tablet TAKE 1 TABLET BY MOUTH EVERY DAY AT NIGHT Yes AMANDA Hsu CNP   sucralfate (CARAFATE) 1 GM/10ML suspension Take 10 mLs by mouth 4 times daily  Patient not taking: Reported on 2020  AMANDA Nieto CNP       Social History     Tobacco Use  Smoking status: Former Smoker     Packs/day: 1.00     Years: 14.00     Pack years: 14.00     Start date: 1995     Quit date: 2020     Years since quittin.4    Smokeless tobacco: Former User     Types: Chew   Substance Use Topics    Alcohol use: Not Currently     Comment: recovering alcoholic     Drug use: Not Currently     Types: Methamphetamines     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          No Known Allergies,   Past Medical History:   Diagnosis Date    AA (alcohol abuse)     Depression    ,   Past Surgical History:   Procedure Laterality Date    HERNIA REPAIR     ,   Social History     Tobacco Use    Smoking status: Former Smoker     Packs/day: 1.00     Years: 14.00     Pack years: 14.00     Start date: 1995     Quit date: 2020     Years since quittin.4    Smokeless tobacco: Former User     Types: Chew   Substance Use Topics    Alcohol use: Not Currently     Comment: recovering alcoholic     Drug use: Not Currently     Types: Methamphetamines     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   ,   Family History   Problem Relation Age of Onset    Alcohol Abuse Maternal Grandfather     Heart Attack Paternal Grandfather     Alcohol Abuse Paternal Grandfather    ,   Immunization History   Administered Date(s) Administered    Tdap (Boostrix, Adacel) 10/01/2019   ,   Health Maintenance   Topic Date Due    Varicella vaccine (1 of 2 - 2-dose childhood series) 1981    HIV screen  1995    Flu vaccine (1) 2020    Diabetes screen  12/10/2023    Lipid screen  12/10/2025    DTaP/Tdap/Td vaccine (2 - Td) 10/01/2029    Hepatitis A vaccine  Aged Out    Hepatitis B vaccine  Aged Out    Hib vaccine  Aged Out    Meningococcal (ACWY) vaccine  Aged Out    Pneumococcal 0-64 years Vaccine  Aged Out       PHYSICAL EXAMINATION:  [ INSTRUCTIONS:  \"[x]\" Indicates a positive item  \"[]\" Indicates a negative item  -- DELETE ALL ITEMS NOT EXAMINED] Vital Signs: (As obtained by patient/caregiver or practitioner observation)    Blood pressure-  Heart rate-    Respiratory rate-    Temperature-  Pulse oximetry-     Constitutional: [x] Appears well-developed and well-nourished [] No apparent distress      [] Abnormal-   Mental status  [x] Alert and awake  [] Oriented to person/place/time []Able to follow commands      Eyes:  EOM    [x]  Normal  [] Abnormal-  Sclera  [x]  Normal  [] Abnormal -         Discharge []  None visible  [] Abnormal -    HENT:   [x] Normocephalic, atraumatic. [] Abnormal   [x] Mouth/Throat: Mucous membranes are moist.     External Ears [x] Normal  [] Abnormal-     Neck: [x] No visualized mass     Pulmonary/Chest: [] Respiratory effort normal.  [] No visualized signs of difficulty breathing or respiratory distress        [] Abnormal-      Musculoskeletal:   [x] Normal gait with no signs of ataxia         [x] Normal range of motion of neck        [] Abnormal-       Neurological:        [x] No Facial Asymmetry (Cranial nerve 7 motor function) (limited exam to video visit)          [x] No gaze palsy        [] Abnormal-         Skin:        [x] No significant exanthematous lesions or discoloration noted on facial skin         [] Abnormal-            Psychiatric:       [x] Normal Affect [] No Hallucinations        [] Abnormal-     Other pertinent observable physical exam findings-     EKG 12/11/2020  Normal sinus rhythm    CT chest 12/11/2020  1. No evidence of pulmonary embolism 2. Small pericardial effusion 3. Multifocal atelectasis         ASSESSMENT:  Chest pain - typical/atypical features  SOB - possible cardiac   Small pericardial effusion by chest CT.  Consider pericarditis   Family HX of heart diease in grandfather- CABG surgery   Elevated triglycerides     Plan:   Labs - Sed rate   Continue to take Aspirin   Stress echocardiogram    Continue current medications   Check blood pressure at home weekly Regular exercise and following a healthy diet encouraged   Follow up with me   Discuss Elevated triglycerides with PCP         Celestina Ross is a 36 y.o. male being evaluated by a Virtual Visit (video visit) encounter to address concerns as mentioned above. A caregiver was present when appropriate. Due to this being a TeleHealth encounter (During [facility] public health emergency), evaluation of the following organ systems was limited: Vitals/Constitutional/EENT/Resp/CV/GI//MS/Neuro/Skin/Heme-Lymph-Imm. Pursuant to the emergency declaration under the [facility], [address] [place] [address] authority and the [employer] and [employer] Act, this Virtual Visit was conducted with patient's (and/or legal guardian's) consent, to reduce the patient's risk of exposure to COVID-19 and provide necessary medical care. The patient (and/or legal guardian) has also been advised to contact this office for worsening conditions or problems, and seek emergency medical treatment and/or call 911 if deemed necessary. Scribe's attestation: This note was scribed in the presence of Dr. Natalia Kinsey M.D. By Radha Chun RN    The scribes documentation has been prepared under my direction and personally reviewed by me in its entirety. I confirm that the note above accurately reflects all work, treatment, procedures, and medical decision making performed by me. Dr. Natalia Kinsey MD      Services were provided through a video synchronous discussion virtually to substitute for in-person clinic visit. I am seeing the patient today from my office and the patient from their home. --Radha Chun RN on 12/16/2020 at 1:17 PM    An electronic signature was used to authenticate this note.

## 2020-12-16 ENCOUNTER — VIRTUAL VISIT (OUTPATIENT)
Dept: CARDIOLOGY CLINIC | Age: 40
End: 2020-12-16
Payer: COMMERCIAL

## 2020-12-16 PROBLEM — R07.2 PRECORDIAL PAIN: Status: ACTIVE | Noted: 2020-12-16

## 2020-12-16 PROBLEM — R06.02 SOB (SHORTNESS OF BREATH): Status: ACTIVE | Noted: 2020-12-16

## 2020-12-16 PROCEDURE — G8484 FLU IMMUNIZE NO ADMIN: HCPCS | Performed by: INTERNAL MEDICINE

## 2020-12-16 PROCEDURE — G8417 CALC BMI ABV UP PARAM F/U: HCPCS | Performed by: INTERNAL MEDICINE

## 2020-12-16 PROCEDURE — 99244 OFF/OP CNSLTJ NEW/EST MOD 40: CPT | Performed by: INTERNAL MEDICINE

## 2020-12-16 PROCEDURE — G8427 DOCREV CUR MEDS BY ELIG CLIN: HCPCS | Performed by: INTERNAL MEDICINE

## 2020-12-16 NOTE — PATIENT INSTRUCTIONS
Plan:   Labs- Sed rate   Continue to take Aspirin   Stress echocardiogram- try to get testing moved up   Continue current medications   Check blood pressure at home weekly  Regular exercise and following a healthy diet encouraged   Follow up with me   Discuss Elevated triglycerides with PCP

## 2020-12-16 NOTE — LETTER
2020    Follow-up (elevated d-dimer), Chest Pain, and Edema (ankles)       TELEHEALTH EVALUATION -- Audio/Visual (During INQYL-38 public health emergency)    HPI: Harris Guan is a 36 y.o. male who is having a virtual visit today as a NEW patient consult for cardiology, referred by Neyda HANKINS for chest pain. He presented to the ER on 2020 with chest pain with an D-Dimer elevated. CT chest showed no PE, small pericardial effusion. Today he states he will be having a stress echo on 2020. He states he has been having chest pain that feels like a tightness. Pain radiates into his neck. He states chest pain woke him from sleep he went to the ER for evaluation and was treated for GERD. He states he had another episode of sharp chest pain that woke him from sleep. He took [place] which did not help. A few days later he started to have chest pain at work and was taken to the ER again. He states he has had the pain at work with lifting. The pain doubles him over and takes his breath away. Does not radiate. He has to stop and rest for several minutes before the pain resolves. Patient currently denies any weight gain, edema, palpitations,  dizziness, and syncope. Harris Guan (:  1980) has requested an audio/video evaluation for the following concern(s): NEW patient for chest pain         [x] Medications and dosages reviewed. ROS:  [x]Full ROS obtained and negative except as mentioned in HPI    Prior to Visit Medications    Medication Sig Taking?  Authorizing Provider   pantoprazole (PROTONIX) 20 MG tablet Take 2 tablets by mouth daily Yes AMANDA Dawson CNP   traZODone (DESYREL) 100 MG tablet TAKE 1 TABLET BY MOUTH EVERY DAY AT NIGHT Yes AMANDA Leal CNP   sucralfate (CARAFATE) 1 GM/10ML suspension Take 10 mLs by mouth 4 times daily  Patient not taking: Reported on 2020  AMANDA Dawson CNP       Social History     Tobacco Use  Smoking status: Former Smoker     Packs/day: 1.00     Years: 14.00     Pack years: 14.00     Start date: 1995     Quit date: 2020     Years since quittin.4    Smokeless tobacco: Former User     Types: Chew   Substance Use Topics    Alcohol use: Not Currently     Comment: recovering alcoholic     Drug use: Not Currently     Types: Methamphetamines     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          No Known Allergies,   Past Medical History:   Diagnosis Date    AA (alcohol abuse)     Depression    ,   Past Surgical History:   Procedure Laterality Date    HERNIA REPAIR     ,   Social History     Tobacco Use    Smoking status: Former Smoker     Packs/day: 1.00     Years: 14.00     Pack years: 14.00     Start date: 1995     Quit date: 2020     Years since quittin.4    Smokeless tobacco: Former User     Types: Chew   Substance Use Topics    Alcohol use: Not Currently     Comment: recovering alcoholic     Drug use: Not Currently     Types: Methamphetamines     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   ,   Family History   Problem Relation Age of Onset    Alcohol Abuse Maternal Grandfather     Heart Attack Paternal Grandfather     Alcohol Abuse Paternal Grandfather    ,   Immunization History   Administered Date(s) Administered    Tdap (Boostrix, Adacel) 10/01/2019   ,   Health Maintenance   Topic Date Due    Varicella vaccine (1 of 2 - 2-dose childhood series) 1981    HIV screen  1995    Flu vaccine (1) 2020    Diabetes screen  12/10/2023    Lipid screen  12/10/2025    DTaP/Tdap/Td vaccine (2 - Td) 10/01/2029    Hepatitis A vaccine  Aged Out    Hepatitis B vaccine  Aged Out    Hib vaccine  Aged Out    Meningococcal (ACWY) vaccine  Aged Out    Pneumococcal 0-64 years Vaccine  Aged Out       PHYSICAL EXAMINATION:  [ INSTRUCTIONS:  \"[x]\" Indicates a positive item  \"[]\" Indicates a negative item  -- DELETE ALL ITEMS NOT EXAMINED] Vital Signs: (As obtained by patient/caregiver or practitioner observation)    Blood pressure-  Heart rate-    Respiratory rate-    Temperature-  Pulse oximetry-     Constitutional: [x] Appears well-developed and well-nourished [] No apparent distress      [] Abnormal-   Mental status  [x] Alert and awake  [] Oriented to person/place/time []Able to follow commands      Eyes:  EOM    [x]  Normal  [] Abnormal-  Sclera  [x]  Normal  [] Abnormal -         Discharge []  None visible  [] Abnormal -    HENT:   [x] Normocephalic, atraumatic. [] Abnormal   [x] Mouth/Throat: Mucous membranes are moist.     External Ears [x] Normal  [] Abnormal-     Neck: [x] No visualized mass     Pulmonary/Chest: [] Respiratory effort normal.  [] No visualized signs of difficulty breathing or respiratory distress        [] Abnormal-      Musculoskeletal:   [x] Normal gait with no signs of ataxia         [x] Normal range of motion of neck        [] Abnormal-       Neurological:        [x] No Facial Asymmetry (Cranial nerve 7 motor function) (limited exam to video visit)          [x] No gaze palsy        [] Abnormal-         Skin:        [x] No significant exanthematous lesions or discoloration noted on facial skin         [] Abnormal-            Psychiatric:       [x] Normal Affect [] No Hallucinations        [] Abnormal-     Other pertinent observable physical exam findings-     EKG 12/11/2020  Normal sinus rhythm    CT chest 12/11/2020  1. No evidence of pulmonary embolism 2. Small pericardial effusion 3. Multifocal atelectasis         ASSESSMENT:  Chest pain - typical/atypical features  SOB - possible cardiac   Small pericardial effusion by chest CT.  Consider pericarditis   Family HX of heart diease in grandfather- CABG surgery   Elevated triglycerides     Plan:   Labs - Sed rate   Continue to take Aspirin   Stress echocardiogram    Continue current medications   Check blood pressure at home weekly Regular exercise and following a healthy diet encouraged   Follow up with me   Discuss Elevated triglycerides with PCP         Marvin Davy is a 36 y.o. male being evaluated by a Virtual Visit (video visit) encounter to address concerns as mentioned above. A caregiver was present when appropriate. Due to this being a TeleHealth encounter (During LPHCL-21 public health emergency), evaluation of the following organ systems was limited: Vitals/Constitutional/EENT/Resp/CV/GI//MS/Neuro/Skin/Heme-Lymph-Imm. Pursuant to the emergency declaration under the [address] [place] [address], [address] [place] [address] authority and the [employer] and [employer] Act, this Virtual Visit was conducted with patient's (and/or legal guardian's) consent, to reduce the patient's risk of exposure to COVID-19 and provide necessary medical care. The patient (and/or legal guardian) has also been advised to contact this office for worsening conditions or problems, and seek emergency medical treatment and/or call 911 if deemed necessary. Scribe's attestation: This note was scribed in the presence of Dr. Wilbur Phoenix M.D. By Jose Miguel Bond RN    The scribes documentation has been prepared under my direction and personally reviewed by me in its entirety. I confirm that the note above accurately reflects all work, treatment, procedures, and medical decision making performed by me. Dr. Wilbur Phoenix, MD      Services were provided through a video synchronous discussion virtually to substitute for in-person clinic visit. I am seeing the patient today from my office and the patient from their home. --Jose Miguel Bond RN on 12/16/2020 at 1:17 PM    An electronic signature was used to authenticate this note.

## 2020-12-24 ENCOUNTER — HOSPITAL ENCOUNTER (OUTPATIENT)
Dept: NON INVASIVE DIAGNOSTICS | Age: 40
Discharge: HOME | End: 2020-12-24
Payer: COMMERCIAL

## 2020-12-24 LAB
LV EF: 55 %
LVEF MODALITY: NORMAL

## 2020-12-24 PROCEDURE — 93350 STRESS TTE ONLY: CPT

## 2020-12-24 PROCEDURE — 93320 DOPPLER ECHO COMPLETE: CPT

## 2020-12-24 PROCEDURE — 93017 CV STRESS TEST TRACING ONLY: CPT

## 2021-01-13 ENCOUNTER — TELEPHONE (OUTPATIENT)
Dept: PULMONOLOGY | Age: 41
End: 2021-01-13

## 2021-01-13 NOTE — TELEPHONE ENCOUNTER
Called patient 3x to check-in for virtual visit and patient did not answer or return calls. Patient did not show for 31/90 day appointment  with Bonny Gomez on 1/13/21    Same Day Cancellation: No    Patient rescheduled:  No    Patient was also no show on: N/A    LOV 7/17/2020    Assessment:       · Snoring  · Observed sleep apnea   · Hypersomnia   · ADHD- taking straterra  · Obesity  · Sleep onset insomnia-psychophysiological hyperarousal, poor sleep hygiene, probable CHAYO likely contributing. Taking trazodone per PCP with good benefit  · History of alcohol abuse- clean/sober 18 months         Plan:      · HST to evaluate forsleep related breathing disorder. · Treatment options were discussed with patient if HST reveals CHAYO, including CPAP therapy, oral appliances and upper airway surgery. · Patient is in favor of auto CPAP if HST is positive for CHAYO  · Trial auto CPAP 8-16 CM H2O if HST is positive for obstructive sleep apnea  · Sleep hygiene  · Cognitive behavioral therapy was discussed with patient including stimulus control and sleep restriction  · Avoid sedatives, alcohol and caffeinated drinks at bed time. · No driving motorized vehicles or operating heavy machinery while fatigue, drowsy or sleepy. · Weight loss is also recommended as a long-term intervention. · Complications of CHAYO if not treated were discussed with patient patient to include systemic hypertension, pulmonary hypertension, cardiovascular morbidities, car accidents and all cause mortality. · Discussed pathophysiology of CHAYO with patient today- video shown in office.   · Patient education regarding sleep tips     Consent for telehealth visit was obtained and is noted in chart

## 2021-01-29 ENCOUNTER — VIRTUAL VISIT (OUTPATIENT)
Dept: PULMONOLOGY | Age: 41
End: 2021-01-29
Payer: COMMERCIAL

## 2021-01-29 DIAGNOSIS — R53.83 OTHER FATIGUE: ICD-10-CM

## 2021-01-29 DIAGNOSIS — G47.33 MILD OBSTRUCTIVE SLEEP APNEA: Primary | ICD-10-CM

## 2021-01-29 DIAGNOSIS — E66.9 OBESITY (BMI 30.0-34.9): ICD-10-CM

## 2021-01-29 DIAGNOSIS — G47.00 INSOMNIA, UNSPECIFIED TYPE: ICD-10-CM

## 2021-01-29 DIAGNOSIS — Z78.9 DIFFICULTY USING CONTINUOUS POSITIVE AIRWAY PRESSURE (CPAP) DEVICE: ICD-10-CM

## 2021-01-29 DIAGNOSIS — Z71.89 CPAP USE COUNSELING: ICD-10-CM

## 2021-01-29 PROBLEM — E66.811 OBESITY (BMI 30.0-34.9): Status: ACTIVE | Noted: 2021-01-29

## 2021-01-29 PROCEDURE — G8427 DOCREV CUR MEDS BY ELIG CLIN: HCPCS | Performed by: NURSE PRACTITIONER

## 2021-01-29 PROCEDURE — 99214 OFFICE O/P EST MOD 30 MIN: CPT | Performed by: NURSE PRACTITIONER

## 2021-01-29 RX ORDER — ASPIRIN 81 MG/1
81 TABLET ORAL DAILY
COMMUNITY

## 2021-01-29 RX ORDER — TRAZODONE HYDROCHLORIDE 100 MG/1
TABLET ORAL
Qty: 90 TABLET | Refills: 0 | Status: SHIPPED | OUTPATIENT
Start: 2021-01-29 | End: 2021-04-07

## 2021-01-29 ASSESSMENT — SLEEP AND FATIGUE QUESTIONNAIRES
HOW LIKELY ARE YOU TO NOD OFF OR FALL ASLEEP WHILE WATCHING TV: 1
ESS TOTAL SCORE: 5
HOW LIKELY ARE YOU TO NOD OFF OR FALL ASLEEP WHILE SITTING INACTIVE IN A PUBLIC PLACE: 0
HOW LIKELY ARE YOU TO NOD OFF OR FALL ASLEEP WHILE SITTING AND READING: 0
HOW LIKELY ARE YOU TO NOD OFF OR FALL ASLEEP IN A CAR, WHILE STOPPED FOR A FEW MINUTES IN TRAFFIC: 0

## 2021-01-29 NOTE — PROGRESS NOTES
Patient ID: Chadd Caicedo is a 36 y.o. male who is being seen today for   Chief Complaint   Patient presents with    Sleep Apnea     31-90        Referring: COLIN Ryan  HPI:     Chadd Caicedo is a 36 y.o. male for televideo appointment via video and audio doxy. me virtual visit for CHAYO follow up. HST was reviewed by me and noted below. Results were dicussed with patient and multiple good questions were answered. States he is not doing well with CPAP. States he is waking with it off. States does feel more rested when he wakes when using CPAP    Patient is using CPAP 2hrs/night. Using humidifier. No snoring on CPAP. The pressure feels okay. The mask is comfortable- full face. No mask leak. No significant daytime sleepiness. No nodding off when driving. No dry nose or throat. No fatigue. Taking trazodone per PCP with good benefit. Bedtime is 9-930 pm and rise time is 5-530 am. Sleep onset is few minutes. Wakes up 0-1 times at night total. 0-1 nocturia. It takes few minutes to fall back a sleep. Rare naps during the day. No headache in am. No weight gain. No caffienated beverages during the day. No alcohol. ESS is 5.     Previous HPI 7/17/20 Vasiliy Cannon is a 36 y.o. male for televideo appointment via video and audio doxy. me virtual visit for sleep disturbance evaluation. Patient reports snoring at night for the past 10 years. Worse in supine position. Wakes self snoring. Has witnessed apnea. No restorative sleep. +dry mouth upon awakening, sometimes sore throat. Fatigue and tiredness during the day. No history of sleep apnea. Bedtime 930-10 pm and rise time is 630 am. It takes few minutes to fall asleep takes trazodone  mg. +TV in bedroom, sleeps with TV on. No nocturia. Wakes up 3-4 times at night, gets a drink then back to bed. It takes 5-10 minutes to fall back a sleep. Takes 1 nap during the day ( 45 minutes). No headache in am. States fitbit says HR in 30-40s at night at times. No car wrecks or near wrecks because of the sleepiness. No nodding off while driving. Gained 50 pounds in the past 1-2 years. No forgetfulness or decreased concentration, takes Strattera for ADHD. Drinks 4 caffinated beverages per day. No alcohol, sober 18 mo. No restless feelings in legs at night. No teeth grinding. No nightmares. No sleep walking. No night time panic attacks. No narcotics. No drug abuse. +history of depression, no medications states feels pretty well controlled. No history of anxiety. No history of atrial fibrillation. No history of DM. No history of HTN. No history of ischemic heart disease. No history of stroke. ESS is 7. No smoking, chews tobacco- using chantix. No FH for RLS or narcolepsy.  +FH for CHAYO    Sleep Medicine 1/29/2021 7/17/2020   Sitting and reading 0 1   Watching TV 1 2   Sitting, inactive in a public place (e.g. a theatre or a meeting) 0 0   As a passenger in a car for an hour without a break 1 1   Lying down to rest in the afternoon when circumstances permit 2 2   Sitting and talking to someone 0 0   Sitting quietly after a lunch without alcohol 1 1   In a car, while stopped for a few minutes in traffic 0 0 Total score 5 7       Past Medical History:  Past Medical History:   Diagnosis Date    AA (alcohol abuse)     Depression        Past Surgical History:        Procedure Laterality Date    HERNIA REPAIR         Allergies:  has No Known Allergies. Social History:    TOBACCO:   reports that he quit smoking about 6 months ago. He started smoking about 25 years ago. He has a 14.00 pack-year smoking history. He has quit using smokeless tobacco.  His smokeless tobacco use included chew. ETOH:   reports previous alcohol use. Family History:       Problem Relation Age of Onset    Alcohol Abuse Maternal Grandfather     Heart Attack Paternal Grandfather     Alcohol Abuse Paternal Grandfather        Current Medications:    Current Outpatient Medications:     traZODone (DESYREL) 100 MG tablet, TAKE 1 TABLET BY MOUTH EVERY DAY AT NIGHT, Disp: 90 tablet, Rfl: 0    aspirin 81 MG EC tablet, Take 81 mg by mouth daily, Disp: , Rfl:     pantoprazole (PROTONIX) 20 MG tablet, Take 2 tablets by mouth daily, Disp: 30 tablet, Rfl: 0    sucralfate (CARAFATE) 1 GM/10ML suspension, Take 10 mLs by mouth 4 times daily (Patient not taking: Reported on 12/16/2020), Disp: 1200 mL, Rfl: 3      Review of Systems      Objective:   PHYSICAL EXAM:  There were no vitals taken for this visit. Physical Exam  Exam:  Gen: No acute distress, does not appear to be in pain. Appears well developed and nourished. HENT: Head is normocephalic and atraumatic. Normal appearing nose. External Ears normal.   Neck: No visualized mass. Trachea is midline   Eyes: EOM intact. No visible discharge. Resp:No visualized signs of difficulty breathing or respiratory distress, speaking in full sentences. Respiratory effort normal.  Neuro: Awake. Alert. Able to follow commands. No facial asymmetry. Psych: Oriented x 3. No anxiety. Normal affect.          DATA:   11/10/20 HST AHI 8.8, low SpO2 81% under 89% 16.5 min    CPAP download data: Compliance download report from 12/13/20 to 1/1121 reviewed today by me and showed patient is using machine 1:47 hrs/night with 0% compliance and AHI 1.0 within this time frame. 0/30days with greater than 4 hours of machine use. 90% pressure 9.5 cm H20 on auto CPAP 8-16 cm H2O    Assessment:       · Mild CHAYO. Auto CPAP 8-16 cm H2O.  suboptimal compliance on review today  · Snoring- resolved on CPAP  · Observed sleep apnea   · Hypersomnia -improving  · ADHD- taking straterra  · Obesity  · Sleep onset insomnia-psychophysiological hyperarousal, poor sleep hygiene, probable CHAYO likely contributing. Taking trazodone per PCP with good benefit  · History of alcohol abuse- clean/sober 18 months        Plan:      Send order to change to auto CPAP 10-16 cm H2O  Discussed CPAP acclimation techniques  Advised to use CPAP 6-8 hrs at night and during naps. Replacement of mask, tubing, head straps every 3-6 months or sooner if damaged. Patient instructed to contact [employer] company for any mask, tubing or machine trouble shooting if problems arise. · Sleep hygiene  · Cognitive behavioral therapy was discussed with patient including stimulus control and sleep restriction  · Avoid sedatives, alcohol and caffeinated drinks at bed time. · No driving motorized vehicles or operating heavy machinery while fatigue, drowsy or sleepy. · Weight loss is also recommended as a long-term intervention. · Complications of CHAYO if not treated were discussed with patient patient to include systemic hypertension, pulmonary hypertension, cardiovascular morbidities, car accidents and all cause mortality. Patient education provided regarding sleep tips and CPAP cleaning recommendations     Consent for telehealth visit was obtained and is noted in chart      THIS VISIT WAS COMPLETED VIRTUALLY USING DOXY. ME Mila Britton is a 36 y.o. male being evaluated by a Virtual Visit (video visit) encounter to address concerns as mentioned above. A caregiver was present when appropriate. Due to this being a TeleHealth encounter ([facility]), evaluation of the following organ systems was limited: Vitals/Constitutional/EENT/Resp/CV/GI//MS/Neuro/Skin/Heme-Lymph-Imm. Pursuant to the emergency declaration under the [address] [place] [address] and the [employer] and [employer] Act, this Virtual Visit was conducted with patient's (and/or legal guardian's) consent, to reduce the patient's risk of exposure to COVID-19 and provide necessary medical care. The patient (and/or legal guardian) has also been advised to contact this office for worsening conditions or problems, and seek emergency medical treatment and/or call 911 if deemed necessary. Patient identification was verified at the start of the visit: Yes      Services were provided through a video synchronous discussion virtually to substitute for in-person clinic visit. Patient and provider were located at their individual homes. --AMANDA Regalado - CNP on 1/29/2021 at 10:37 AM    An electronic signature was used to authenticate this note.

## 2021-01-29 NOTE — PATIENT INSTRUCTIONS

## 2021-02-26 ENCOUNTER — VIRTUAL VISIT (OUTPATIENT)
Dept: PULMONOLOGY | Age: 41
End: 2021-02-26
Payer: COMMERCIAL

## 2021-02-26 DIAGNOSIS — E66.9 OBESITY (BMI 30.0-34.9): ICD-10-CM

## 2021-02-26 DIAGNOSIS — G47.33 MILD OBSTRUCTIVE SLEEP APNEA: Primary | ICD-10-CM

## 2021-02-26 DIAGNOSIS — Z71.89 CPAP USE COUNSELING: ICD-10-CM

## 2021-02-26 DIAGNOSIS — Z78.9 DIFFICULTY USING CONTINUOUS POSITIVE AIRWAY PRESSURE (CPAP) DEVICE: ICD-10-CM

## 2021-02-26 PROCEDURE — G8427 DOCREV CUR MEDS BY ELIG CLIN: HCPCS | Performed by: NURSE PRACTITIONER

## 2021-02-26 PROCEDURE — 99213 OFFICE O/P EST LOW 20 MIN: CPT | Performed by: NURSE PRACTITIONER

## 2021-02-26 ASSESSMENT — SLEEP AND FATIGUE QUESTIONNAIRES
HOW LIKELY ARE YOU TO NOD OFF OR FALL ASLEEP WHILE SITTING AND TALKING TO SOMEONE: 0
HOW LIKELY ARE YOU TO NOD OFF OR FALL ASLEEP WHILE WATCHING TV: 1

## 2021-02-26 NOTE — PATIENT INSTRUCTIONS

## 2021-02-26 NOTE — PROGRESS NOTES
Patient ID: Carlie Francois is a 36 y.o. male who is being seen today for   Chief Complaint   Patient presents with    Sleep Apnea     4-6 week        Referring: AMANDA Weaver-REBECCA  HPI:     Carlie Francois is a 36 y.o. male for televideo appointment via video and audio doxy. me virtual visit for CHAYO follow up. States he tried CPAP again for about a week. States mask would come off and he would not realize it until woke in the morning. He is unsure why he removed CPAP at night wonders if feels suffocated and takes it off or if mask bothers him. He is unsure if he wants to continue with CPAP. Patient is using CPAP   1 hrs/night. Using humidifier. No snoring on CPAP. The pressure is well tolerated. The mask is comfortable- full face mask. No mask leak. No significant daytime sleepiness. No nodding off when driving. No dry nose or throat. Some fatigue. Bedtime is 9-930 pm and rise time is 5-530 am. Sleep onset is few minutes. Wakes up 0-1 times at night total. 0-1 nocturia. It takes few minutes to fall back a sleep. Rare naps during the day. No headache in am. No weight gain. 1-2 caffienated beverages during the day. No alcohol. ESS is 6. Previous HPI 1/29/21  Carlie Francois is a 36 y.o. male for televideo appointment via video and audio doxy. me virtual visit for CHAYO follow up. HST was reviewed by me and noted below. Results were dicussed with patient and multiple good questions were answered. States he is not doing well with CPAP. States he is waking with it off. States does feel more rested when he wakes when using CPAP. Patient is using CPAP 2hrs/night. Using humidifier. No snoring on CPAP. The pressure feels okay. The mask is comfortable- full face. No mask leak. No significant daytime sleepiness. No nodding off when driving. No dry nose or throat. No fatigue. Taking trazodone per PCP with good benefit. Bedtime is 9-930 pm and rise time is 5-530 am. Sleep onset is few minutes. Wakes up 0-1 times at night total. 0-1 nocturia. It takes few minutes to fall back a sleep. Rare naps during the day. No headache in am. No weight gain. No caffienated beverages during the day. No alcohol. ESS is 5.     Previous HPI 7/17/20 Mila Britton is a 36 y.o. male for televideo appointment via video and audio doxy. me virtual visit for sleep disturbance evaluation. Patient reports snoring at night for the past 10 years. Worse in supine position. Wakes self snoring. Has witnessed apnea. No restorative sleep. +dry mouth upon awakening, sometimes sore throat. Fatigue and tiredness during the day. No history of sleep apnea. Bedtime 930-10 pm and rise time is 630 am. It takes few minutes to fall asleep takes trazodone  mg. +TV in bedroom, sleeps with TV on. No nocturia. Wakes up 3-4 times at night, gets a drink then back to bed. It takes 5-10 minutes to fall back a sleep. Takes 1 nap during the day ( 45 minutes). No headache in am. States fitbit says HR in 30-40s at night at times. No car wrecks or near wrecks because of the sleepiness. No nodding off while driving. Gained 50 pounds in the past 1-2 years. No forgetfulness or decreased concentration, takes Strattera for ADHD. Drinks 4 caffinated beverages per day. No alcohol, sober 18 mo. No restless feelings in legs at night. No teeth grinding. No nightmares. No sleep walking. No night time panic attacks. No narcotics. No drug abuse. +history of depression, no medications states feels pretty well controlled. No history of anxiety. No history of atrial fibrillation. No history of DM. No history of HTN. No history of ischemic heart disease. No history of stroke. ESS is 7. No smoking, chews tobacco- using chantix. No FH for RLS or narcolepsy.  +FH for CHAYO    Sleep Medicine 2/26/2021 1/29/2021 7/17/2020   Sitting and reading 1 0 1   Watching TV 1 1 2   Sitting, inactive in a public place (e.g. a theatre or a meeting) 0 0 0   As a passenger in a car for an hour without a break 1 1 1   Lying down to rest in the afternoon when circumstances permit 2 2 2   Sitting and talking to someone 0 0 0   Sitting quietly after a lunch without alcohol 1 1 1 In a car, while stopped for a few minutes in traffic 0 0 0   Total score 6 5 7       Past Medical History:  Past Medical History:   Diagnosis Date    AA (alcohol abuse)     Depression        Past Surgical History:        Procedure Laterality Date    HERNIA REPAIR         Allergies:  has No Known Allergies. Social History:    TOBACCO:   reports that he quit smoking about 7 months ago. He started smoking about 25 years ago. He has a 14.00 pack-year smoking history. He has quit using smokeless tobacco.  His smokeless tobacco use included chew. ETOH:   reports previous alcohol use. Family History:       Problem Relation Age of Onset    Alcohol Abuse Maternal Grandfather     Heart Attack Paternal Grandfather     Alcohol Abuse Paternal Grandfather        Current Medications:    Current Outpatient Medications:     traZODone (DESYREL) 100 MG tablet, TAKE 1 TABLET BY MOUTH EVERY DAY AT NIGHT, Disp: 90 tablet, Rfl: 0    aspirin 81 MG EC tablet, Take 81 mg by mouth daily, Disp: , Rfl:     pantoprazole (PROTONIX) 20 MG tablet, Take 2 tablets by mouth daily (Patient not taking: Reported on 2/26/2021), Disp: 30 tablet, Rfl: 0    sucralfate (CARAFATE) 1 GM/10ML suspension, Take 10 mLs by mouth 4 times daily (Patient not taking: Reported on 12/16/2020), Disp: 1200 mL, Rfl: 3      Review of Systems      Objective:   PHYSICAL EXAM:  There were no vitals taken for this visit. Physical Exam  Exam:  Gen: No acute distress, does not appear to be in pain. Appears well developed and nourished. HENT: Head is normocephalic and atraumatic. Normal appearing nose. External Ears normal.   Neck: No visualized mass. Trachea is midline   Eyes: EOM intact. No visible discharge. Resp:No visualized signs of difficulty breathing or respiratory distress, speaking in full sentences. Respiratory effort normal.  Neuro: Awake. Alert. Able to follow commands. No facial asymmetry. Psych: Oriented x 3. No anxiety. Normal affect. DATA:   11/10/20 HST AHI 8.8, low SpO2 81% under 89% 16.5 min    CPAP download data:  Compliance download report from 12/13/20 to 1/1121 reviewed today by me and showed patient is using machine 1:47 hrs/night with 0% compliance and AHI 1.0 within this time frame. 0/30days with greater than 4 hours of machine use. 90% pressure 9.5 cm H20 on auto CPAP 8-16 cm H2O    Compliance download report from 1/25/21 to 2/23/21 reviewed today by me and showed patient is using machine 1:06 hrs/night with 0% compliance and AHI 1.1 within this time frame. 0/30days with greater than 4 hours of machine use. 90% pressure 11 cm H20 on auto CPAP 10-16 cm H2O      Assessment:       · Mild CHAYO. Auto CPAP 8-16 cm H2O.  suboptimal compliance on review today  · Snoring- resolved on CPAP  · Observed sleep apnea   · Hypersomnia -improving  · ADHD- taking straterra  · Obesity  · Sleep onset insomnia-psychophysiological hyperarousal, poor sleep hygiene, probable CHAYO likely contributing. Taking trazodone per PCP with good benefit  · History of alcohol abuse- clean/sober 18 months        Plan:      Discussed option of BiPAP titration. Patient declines currently  Discussed option of oral appliance for sleep apnea patient would like referral for this. Referral to Dr. Jacobo Medina to evaluate for oral appliance for sleep apnea  Discussed CPAP acclimation techniques  Advised to use CPAP 6-8 hrs at night and during naps. Replacement of mask, tubing, head straps every 3-6 months or sooner if damaged. Patient instructed to contact [employer] for any mask, tubing or machine trouble shooting if problems arise. · Sleep hygiene  · Cognitive behavioral therapy was discussed with patient including stimulus control and sleep restriction  · Avoid sedatives, alcohol and caffeinated drinks at bed time. · No driving motorized vehicles or operating heavy machinery while fatigue, drowsy or sleepy. · Weight loss is also recommended as a long-term intervention. · Complications of CHAYO if not treated were discussed with patient patient to include systemic hypertension, pulmonary hypertension, cardiovascular morbidities, car accidents and all cause mortality. Patient education provided regarding sleep tips and CPAP cleaning recommendations      Consent for telehealth visit was obtained and is noted in chart      THIS VISIT WAS COMPLETED VIRTUALLY USING DOXY. Elizabeth Estrella is a 36 y.o. male being evaluated by a Virtual Visit (video visit) encounter to address concerns as mentioned above. A caregiver was present when appropriate. Due to this being a TeleHealth encounter (During [facility]-47 public health emergency), evaluation of the following organ systems was limited: Vitals/Constitutional/EENT/Resp/CV/GI//MS/Neuro/Skin/Heme-Lymph-Imm. Pursuant to the emergency declaration under the [address] [place] [address], [address] [place] [address] authority and the [employer] Act, this Virtual Visit was conducted with patient's (and/or legal guardian's) consent, to reduce the patient's risk of exposure to COVID-19 and provide necessary medical care. The patient (and/or legal guardian) has also been advised to contact this office for worsening conditions or problems, and seek emergency medical treatment and/or call 911 if deemed necessary. Patient identification was verified at the start of the visit: Yes      Services were provided through a video synchronous discussion virtually to substitute for in-person clinic visit. Patient and provider were located at their individual homes. --AMANDA Woodrfuf - CNP on 2/26/2021 at 2:03 PM    An electronic signature was used to authenticate this note.

## 2021-04-06 DIAGNOSIS — G47.00 INSOMNIA, UNSPECIFIED TYPE: ICD-10-CM

## 2021-04-07 RX ORDER — TRAZODONE HYDROCHLORIDE 100 MG/1
TABLET ORAL
Qty: 90 TABLET | Refills: 0 | Status: SHIPPED | OUTPATIENT
Start: 2021-04-07 | End: 2021-08-05

## 2021-04-07 NOTE — TELEPHONE ENCOUNTER
Refill Request     Last Seen: 11/17/2020    Last Written: 1/29/2021    Next Appointment:   Future Appointments   Date Time Provider Kinza Zaida   4/28/2021  1:20 PM AMANDA Claire CNP [facility] SLEEP [facility]       \      Requested Prescriptions     Pending Prescriptions Disp Refills    traZODone (DESYREL) 100 MG tablet [Pharmacy Med Name: TRAZODONE 100 MG TABLET] 90 tablet 0     Sig: TAKE 1 TABLET BY MOUTH DAILY AT NIGHT

## 2021-04-08 ENCOUNTER — TELEPHONE (OUTPATIENT)
Dept: FAMILY MEDICINE CLINIC | Age: 41
End: 2021-04-08

## 2021-04-09 DIAGNOSIS — L60.0 INGROWN TOENAIL: Primary | ICD-10-CM

## 2021-04-28 ENCOUNTER — TELEPHONE (OUTPATIENT)
Dept: PULMONOLOGY | Age: 41
End: 2021-04-28

## 2021-04-28 NOTE — TELEPHONE ENCOUNTER
Patient did not show for Sleep fu  with Abby Bueno on 4/28/21. Reason:  I called patient 3 times and left messages and patient did not return any of the calls    This is patient's second no show. Patient was ano show on: 1/13/21. Patient did not reschedule. Reschedule date:  Pt will need to be called to see if he wants to reschedule.

## 2021-04-30 NOTE — TELEPHONE ENCOUNTER
Patient called with message left for patient to call back to office. Patient has been called multiple times with no answer or call back.

## 2021-06-04 ENCOUNTER — TELEPHONE (OUTPATIENT)
Dept: FAMILY MEDICINE CLINIC | Age: 41
End: 2021-06-04

## 2021-06-04 DIAGNOSIS — Z31.41 FERTILITY TESTING: Primary | ICD-10-CM

## 2021-06-04 NOTE — TELEPHONE ENCOUNTER
----- Message from [facility] sent at 6/3/2021  5:52 PM EDT -----  Subject: Message to Provider    QUESTIONS  Information for Provider? Patient is wanting to do a sperm count/Fertility   Check and wanted to know the next steps on what to do in the process, if   he needs to be scheduled with Garon Schlatter or if he needs a referral.   ---------------------------------------------------------------------------  --------------  [employer] [facility] [place]  What is the best way for the office to contact you? OK to leave message on   voicemail  Preferred Call Back Phone Number? [phone]  ---------------------------------------------------------------------------  --------------  SCRIPT ANSWERS  Relationship to Patient?  Self

## 2021-06-08 NOTE — TELEPHONE ENCOUNTER
Spoke to patient & given referral information.     [address] [facility], [address] [facility], [address]   Phone: [phone]   Fax: [phone]

## 2021-08-04 DIAGNOSIS — G47.00 INSOMNIA, UNSPECIFIED TYPE: ICD-10-CM

## 2021-08-05 RX ORDER — TRAZODONE HYDROCHLORIDE 100 MG/1
TABLET ORAL
Qty: 90 TABLET | Refills: 0 | Status: SHIPPED | OUTPATIENT
Start: 2021-08-05 | End: 2021-11-08

## 2021-08-05 NOTE — TELEPHONE ENCOUNTER
Refill Request     Last Seen: Last Seen Department: 12/10/2020  Last Seen by PCP: 11/17/2020    Last Written: 4/7/2021    Next Appointment:   No future appointments. Message to [facility] to schedule appointment.     1 year (around 11/17/2021) for Routine office visit, Annual Physical.        Requested Prescriptions     Pending Prescriptions Disp Refills    traZODone (DESYREL) 100 MG tablet [Pharmacy Med Name: TRAZODONE 100 MG TABLET] 90 tablet 0     Sig: TAKE 1 TABLET BY MOUTH DAILY AT NIGHT

## 2021-11-05 DIAGNOSIS — G47.00 INSOMNIA, UNSPECIFIED TYPE: ICD-10-CM

## 2021-11-08 RX ORDER — TRAZODONE HYDROCHLORIDE 100 MG/1
TABLET ORAL
Qty: 90 TABLET | Refills: 1 | Status: SHIPPED | OUTPATIENT
Start: 2021-11-08 | End: 2022-05-16

## 2021-11-08 NOTE — TELEPHONE ENCOUNTER
Refill Request     Last Seen: Last Seen Department: 12/10/2020  Last Seen by PCP: 11/17/2020    Last Written: 8/5/21    Next Appointment:   Future Appointments   Date Time Provider Kinza Cerda   11/18/2021  3:00 PM Konrad [place], APRN - CNP [facility]       Future appointment scheduled      Requested Prescriptions     Pending Prescriptions Disp Refills    traZODone (DESYREL) 100 MG tablet [Pharmacy Med Name: TRAZODONE 100 MG TABLET] 90 tablet 0     Sig: TAKE 1 TABLET BY MOUTH DAILY AT NIGHT

## 2022-02-15 ENCOUNTER — OFFICE VISIT (OUTPATIENT)
Dept: FAMILY MEDICINE CLINIC | Age: 42
End: 2022-02-15
Payer: COMMERCIAL

## 2022-02-15 VITALS
SYSTOLIC BLOOD PRESSURE: 116 MMHG | HEART RATE: 76 BPM | WEIGHT: 233.4 LBS | BODY MASS INDEX: 32.68 KG/M2 | DIASTOLIC BLOOD PRESSURE: 78 MMHG | HEIGHT: 71 IN | OXYGEN SATURATION: 96 %

## 2022-02-15 DIAGNOSIS — G47.33 MILD OBSTRUCTIVE SLEEP APNEA: ICD-10-CM

## 2022-02-15 DIAGNOSIS — F51.01 PRIMARY INSOMNIA: ICD-10-CM

## 2022-02-15 DIAGNOSIS — Z00.00 HEALTHCARE MAINTENANCE: Primary | ICD-10-CM

## 2022-02-15 DIAGNOSIS — E78.2 MIXED HYPERLIPIDEMIA: ICD-10-CM

## 2022-02-15 DIAGNOSIS — K42.9 UMBILICAL HERNIA WITHOUT OBSTRUCTION AND WITHOUT GANGRENE: ICD-10-CM

## 2022-02-15 LAB
A/G RATIO: 1.4 (ref 1.1–2.2)
ALBUMIN SERPL-MCNC: 4.6 G/DL (ref 3.4–5)
ALP BLD-CCNC: 110 U/L (ref 40–129)
ALT SERPL-CCNC: 31 U/L (ref 10–40)
ANION GAP SERPL CALCULATED.3IONS-SCNC: 15 MMOL/L (ref 3–16)
AST SERPL-CCNC: 21 U/L (ref 15–37)
BASOPHILS ABSOLUTE: 0 K/UL (ref 0–0.2)
BASOPHILS RELATIVE PERCENT: 0.5 %
BILIRUB SERPL-MCNC: 0.3 MG/DL (ref 0–1)
BUN BLDV-MCNC: 12 MG/DL (ref 7–20)
CALCIUM SERPL-MCNC: 9.9 MG/DL (ref 8.3–10.6)
CHLORIDE BLD-SCNC: 101 MMOL/L (ref 99–110)
CHOLESTEROL, TOTAL: 333 MG/DL (ref 0–199)
CO2: 21 MMOL/L (ref 21–32)
CREAT SERPL-MCNC: 1 MG/DL (ref 0.9–1.3)
EOSINOPHILS ABSOLUTE: 0.3 K/UL (ref 0–0.6)
EOSINOPHILS RELATIVE PERCENT: 3.2 %
GFR AFRICAN AMERICAN: >60
GFR NON-AFRICAN AMERICAN: >60
GLUCOSE BLD-MCNC: 90 MG/DL (ref 70–99)
HCT VFR BLD CALC: 47.9 % (ref 40.5–52.5)
HDLC SERPL-MCNC: 27 MG/DL (ref 40–60)
HEMOGLOBIN: 16.3 G/DL (ref 13.5–17.5)
LDL CHOLESTEROL CALCULATED: ABNORMAL MG/DL
LYMPHOCYTES ABSOLUTE: 2.4 K/UL (ref 1–5.1)
LYMPHOCYTES RELATIVE PERCENT: 25.6 %
MCH RBC QN AUTO: 29.6 PG (ref 26–34)
MCHC RBC AUTO-ENTMCNC: 34 G/DL (ref 31–36)
MCV RBC AUTO: 87 FL (ref 80–100)
MONOCYTES ABSOLUTE: 0.8 K/UL (ref 0–1.3)
MONOCYTES RELATIVE PERCENT: 8.5 %
NEUTROPHILS ABSOLUTE: 5.9 K/UL (ref 1.7–7.7)
NEUTROPHILS RELATIVE PERCENT: 62.2 %
PDW BLD-RTO: 14.7 % (ref 12.4–15.4)
PLATELET # BLD: 214 K/UL (ref 135–450)
PMV BLD AUTO: 9.7 FL (ref 5–10.5)
POTASSIUM REFLEX MAGNESIUM: 4.7 MMOL/L (ref 3.5–5.1)
RBC # BLD: 5.51 M/UL (ref 4.2–5.9)
SODIUM BLD-SCNC: 137 MMOL/L (ref 136–145)
T4 FREE: 1 NG/DL (ref 0.9–1.8)
TOTAL PROTEIN: 7.8 G/DL (ref 6.4–8.2)
TRIGL SERPL-MCNC: 1092 MG/DL (ref 0–150)
TSH SERPL DL<=0.05 MIU/L-ACNC: 1.62 UIU/ML (ref 0.27–4.2)
VLDLC SERPL CALC-MCNC: ABNORMAL MG/DL
WBC # BLD: 9.6 K/UL (ref 4–11)

## 2022-02-15 PROCEDURE — 99396 PREV VISIT EST AGE 40-64: CPT | Performed by: NURSE PRACTITIONER

## 2022-02-15 PROCEDURE — G8484 FLU IMMUNIZE NO ADMIN: HCPCS | Performed by: NURSE PRACTITIONER

## 2022-02-15 SDOH — ECONOMIC STABILITY: FOOD INSECURITY: WITHIN THE PAST 12 MONTHS, YOU WORRIED THAT YOUR FOOD WOULD RUN OUT BEFORE YOU GOT MONEY TO BUY MORE.: NEVER TRUE

## 2022-02-15 SDOH — ECONOMIC STABILITY: FOOD INSECURITY: WITHIN THE PAST 12 MONTHS, THE FOOD YOU BOUGHT JUST DIDN'T LAST AND YOU DIDN'T HAVE MONEY TO GET MORE.: NEVER TRUE

## 2022-02-15 SDOH — ECONOMIC STABILITY: HOUSING INSECURITY
IN THE LAST 12 MONTHS, WAS THERE A TIME WHEN YOU DID NOT HAVE A STEADY PLACE TO SLEEP OR SLEPT IN A SHELTER (INCLUDING NOW)?: NO

## 2022-02-15 SDOH — ECONOMIC STABILITY: HOUSING INSECURITY: IN THE LAST 12 MONTHS, HOW MANY PLACES HAVE YOU LIVED?: 1

## 2022-02-15 SDOH — ECONOMIC STABILITY: INCOME INSECURITY: IN THE LAST 12 MONTHS, WAS THERE A TIME WHEN YOU WERE NOT ABLE TO PAY THE MORTGAGE OR RENT ON TIME?: NO

## 2022-02-15 ASSESSMENT — ENCOUNTER SYMPTOMS
EYES NEGATIVE: 1
GASTROINTESTINAL NEGATIVE: 1
RESPIRATORY NEGATIVE: 1

## 2022-02-15 ASSESSMENT — PATIENT HEALTH QUESTIONNAIRE - PHQ9
SUM OF ALL RESPONSES TO PHQ QUESTIONS 1-9: 0
1. LITTLE INTEREST OR PLEASURE IN DOING THINGS: 0
2. FEELING DOWN, DEPRESSED OR HOPELESS: 0
SUM OF ALL RESPONSES TO PHQ QUESTIONS 1-9: 0
SUM OF ALL RESPONSES TO PHQ9 QUESTIONS 1 & 2: 0

## 2022-02-15 ASSESSMENT — SOCIAL DETERMINANTS OF HEALTH (SDOH): HOW HARD IS IT FOR YOU TO PAY FOR THE VERY BASICS LIKE FOOD, HOUSING, MEDICAL CARE, AND HEATING?: NOT HARD AT ALL

## 2022-02-15 NOTE — PATIENT INSTRUCTIONS
Patient Education        High Cholesterol: Care Instructions  Overview     Cholesterol is a type of fat in your blood. It is needed for many body functions, such as making new cells. Cholesterol is made by your body. It also comes from food you eat. High cholesterol means that you have too much of the fat in your blood. This raises your risk of a heart attack and stroke. LDL and HDL are part of your total cholesterol. LDL is the \"bad\" cholesterol. High LDL can raise your risk for coronary artery disease, heart attack, and stroke. HDL is the \"good\" cholesterol. It helps clear bad cholesterol from the body. High HDL is linked with a lower risk of coronary artery disease, heart attack, and stroke. Your cholesterol levels help your doctor find out your risk for having a heart attack or stroke. You and your doctor can talk about whether you need to lower your risk and what treatment is best for you. Treatment options include a heart-healthy lifestyle and medicine. Both options can help lower your cholesterol and your risk. The way you choose to lower your risk will depend on how high your risk is for heart attack and stroke. It will also depend on how you feel about taking medicines. Follow-up care is a key part of your treatment and safety. Be sure to make and go to all appointments, and call your doctor if you are having problems. It's also a good idea to know your test results and keep a list of the medicines you take. How can you care for yourself at home? · Eat heart-healthy foods. ? Eat fruits, vegetables, whole grains, beans, and other high-fiber foods. ? Eat lean proteins, such as seafood, lean meats, beans, nuts, and soy products. ? Eat healthy fats, such as canola and olive oil. ? Choose foods that are low in saturated fat. ? Limit sodium and alcohol. ? Limit drinks and foods with added sugar. · Be physically active. Try to do moderate activity at least 2½ hours a week.  Or try to do vigorous activity at least 1¼ hours a week. You may want to walk or try other activities, such as running, swimming, cycling, or playing tennis or team sports. · Stay at a healthy weight or lose weight by making the changes in eating and physical activity listed above. Losing just a small amount of weight, even 5 to 10 pounds, can help reduce your risk for having a heart attack or stroke. · Do not smoke. · Manage other health problems. These include diabetes and high blood pressure. If you think you may have a problem with alcohol or drug use, talk to your doctor. · If you take medicine, take it exactly as prescribed. Call your doctor if you think you are having a problem with your medicine. · Check with your doctor or pharmacist before you use any other medicines, including over-the-counter medicines. Make sure your doctor knows all of the medicines, vitamins, herbal products, and supplements you take. Taking some medicines together can cause problems. When should you call for help? Watch closely for changes in your health, and be sure to contact your doctor if:    · You need help making lifestyle changes.     · You have questions about your medicine. Where can you learn more? Go to https://[email or web address].[email or web address] org and sign in to your [employer] account. Enter V162 in the [facility] box to learn more about \"High Cholesterol: Care Instructions. \"     If you do not have an account, please click on the \"Sign Up Now\" link. Current as of: April 29, 2021               Content Version: 13.1  © 2006-2021 [employer]. Care instructions adapted under license by [facility] ([facility]). If you have questions about a medical condition or this instruction, always ask your healthcare professional. [place] [address] any warranty or liability for your use of this information.          Patient Education        Well Visit, Ages 25 to 48: Care Instructions  Overview     Well visits can help you stay healthy. Your doctor has checked your overall health and may have suggested ways to take good care of yourself. Your doctor also may have recommended tests. At home, you can help prevent illness with healthy eating, regular exercise, and other steps. Follow-up care is a key part of your treatment and safety. Be sure to make and go to all appointments, and call your doctor if you are having problems. It's also a good idea to know your test results and keep a list of the medicines you take. How can you care for yourself at home? · Get screening tests that you and your doctor decide on. Screening helps find diseases before any symptoms appear. · Eat healthy foods. Choose fruits, vegetables, whole grains, protein, and low-fat dairy foods. Limit fat, especially saturated fat. Reduce salt in your diet. · Limit alcohol. If you are a man, have no more than 2 drinks a day or 14 drinks a week. If you are a woman, have no more than 1 drink a day or 7 drinks a week. · Get at least 30 minutes of physical activity on most days of the week. Walking is a good choice. You also may want to do other activities, such as running, swimming, cycling, or playing tennis or team sports. Discuss any changes in your exercise program with your doctor. · Reach and stay at a healthy weight. This will lower your risk for many problems, such as obesity, diabetes, heart disease, and high blood pressure. · Do not smoke or allow others to smoke around you. If you need help quitting, talk to your doctor about stop-smoking programs and medicines. These can increase your chances of quitting for good. · Care for your mental health. It is easy to get weighed down by worry and stress. Learn strategies to manage stress, like deep breathing and mindfulness, and stay connected with your family and community. If you find you often feel sad or hopeless, talk with your doctor. Treatment can help.   · Talk to your doctor about whether you have any risk factors for sexually transmitted infections (STIs). You can help prevent STIs if you wait to have sex with a new partner (or partners) until you've each been tested for STIs. It also helps if you use condoms (male or female condoms) and if you limit your sex partners to one person who only has sex with you. Vaccines are available for some STIs, such as HPV. · Use birth control if it's important to you to prevent pregnancy. Talk with your doctor about the choices available and what might be best for you. · If you think you may have a problem with alcohol or drug use, talk to your doctor. This includes prescription medicines (such as amphetamines and opioids) and illegal drugs (such as cocaine and methamphetamine). Your doctor can help you figure out what type of treatment is best for you. · Protect your skin from too much sun. When you're outdoors from 10 a.m. to 4 p.m., stay in the shade or cover up with clothing and a hat with a wide brim. Wear sunglasses that block UV rays. Even when it's cloudy, put broad-spectrum sunscreen (SPF 30 or higher) on any exposed skin. · See a dentist one or two times a year for checkups and to have your teeth cleaned. · Wear a seat belt in the car. When should you call for help? Watch closely for changes in your health, and be sure to contact your doctor if you have any problems or symptoms that concern you. Where can you learn more? Go to https://WeLabmohinder.healthSolidia [email or web address] org and sign in to your [employer] account. Enter P072 in the [facility] box to learn more about \"Well Visit, Ages 25 to 48: Care Instructions. \"     If you do not have an account, please click on the \"Sign Up Now\" link. Current as of: October 6, 2021               Content Version: 13.1  © 1559-1715 Healthwise, Incorporated. Care instructions adapted under license by [facility] ([facility]).  If you have questions about a medical condition or this instruction, always ask your healthcare professional. [address] any warranty or liability for your use of this information.

## 2022-02-15 NOTE — PROGRESS NOTES
tablet Take 2 tablets by mouth daily  Patient not taking: Reported on 2021  AMANDA Celis CNP   sucralfate (CARAFATE) 1 GM/10ML suspension Take 10 mLs by mouth 4 times daily  Patient not taking: Reported on 2020  AMANDA Celis CNP        No Known Allergies    Past Medical History:   Diagnosis Date    AA (alcohol abuse)     Depression        Past Surgical History:   Procedure Laterality Date    HERNIA REPAIR         Social History     Socioeconomic History    Marital status: Single     Spouse name: Not on file    Number of children: 3    Years of education: Not on file    Highest education level: Not on file   Occupational History    Not on file   Tobacco Use    Smoking status: Former Smoker     Packs/day: 1.00     Years: 14.00     Pack years: 14.00     Start date: 1995     Quit date: 2020     Years since quittin.6    Smokeless tobacco: Former User     Types: [address] Use    Vaping Use: Never used   Substance and Sexual Activity    Alcohol use: Not Currently     Comment: recovering alcoholic     Drug use: Not Currently     Types: 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    Sexual activity: Yes     Partners: Female   Other Topics Concern    Not on file   Social History Narrative    Not on file     Social Determinants of Health     Financial Resource Strain: Low Risk     Difficulty of Paying Living Expenses: Not hard at all   Food Insecurity: No Food Insecurity    Worried About [facility] in the Last Year: Never true    Dimitri of Food in the Last Year: Never true   Transportation Needs: No Transportation Needs    Lack of Transportation (Medical): No    Lack of Transportation (Non-Medical):  No   Physical Activity:     Days of Exercise per Week: Not on file    Minutes of Exercise per Session: Not on file   Stress:     Feeling of Stress : Not on file   Social Connections:     Frequency of Communication with is flat. Bowel sounds are normal.      Palpations: Abdomen is soft. Tenderness: There is no abdominal tenderness. Hernia: A hernia is present. Hernia is present in the umbilical area. Musculoskeletal:         General: Normal range of motion. Cervical back: Full passive range of motion without pain and normal range of motion. No edema or crepitus. No pain with movement. Normal range of motion. Lymphadenopathy:      Cervical: No cervical adenopathy. Right cervical: No superficial cervical adenopathy. Left cervical: No superficial cervical adenopathy. Skin:     General: Skin is warm and dry. Capillary Refill: Capillary refill takes less than 2 seconds. Neurological:      General: No focal deficit present. Mental Status: He is alert and oriented to person, place, and time. Psychiatric:         Mood and Affect: Mood normal.         Behavior: Behavior normal. Behavior is cooperative. Thought Content: Thought content normal.         Judgment: Judgment normal.         No flowsheet data found.     Lab Results   Component Value Date    CHOL 224 12/10/2020    TRIG 639 12/10/2020    HDL 26 12/10/2020    LDLCALC see below 12/10/2020    GLUCOSE 109 12/11/2020    LABA1C 5.4 12/10/2020       The 10-year ASCVD risk score (Brittnee Cevallos, et al., 2013) is: 3.3%    Values used to calculate the score:      Age: 39 years      Sex: Male      Is Non- : No      Diabetic: No      Tobacco smoker: No      Systolic Blood Pressure: 307 mmHg      Is BP treated: No      HDL Cholesterol: 26 mg/dL      Total Cholesterol: 224 mg/dL    Immunization History   Administered Date(s) Administered    COVID-19, [employer], DILUTE for use, Reuben-Sucrose, 5-11 yrs, PF, 10mcg/0.2 mL dose 01/29/2022    COVID-19, Pfizer Purple top, DILUTE for use, 12+ yrs, 30mcg/0.3mL dose 01/29/2022    Tdap (Boostrix, Adacel) 10/01/2019       Health Maintenance   Topic Date Due    Hepatitis C screen  Never done    Varicella vaccine (1 of 2 - 2-dose childhood series) Never done    Depression Screen  Never done    COVID-19 Vaccine (1) 06/22/1992    HIV screen  Never done    Flu vaccine (1) Never done    Diabetes screen  12/10/2023    Lipid screen  12/10/2025    DTaP/Tdap/Td vaccine (2 - Td or Tdap) 10/01/2029    Hepatitis A vaccine  Aged Out    Hepatitis B vaccine  Aged Out    Hib vaccine  Aged Out    Meningococcal (ACWY) vaccine  Aged Out    Pneumococcal 0-64 years Vaccine  Aged Out          ASSESSMENT/PLAN:    1. Healthcare maintenance  Encouraged healthy diet and exercise. Will check fasting labs today and f/u with pt regarding results. -     CBC Auto Differential  -     Comprehensive Metabolic Panel w/ Reflex to MG  -     Lipid Panel  -     T4, Free  -     TSH without Reflex    2. Umbilical hernia without obstruction and without gangrene  -     [facility]    3. Primary insomnia  Stable. Continue Trazodone as directed. 4. Mild obstructive sleep apnea  See HPI. Reprinted dental referral for pt to f/u with. 5. Mixed hyperlipidemia  Will check fasting labs today. May need to start treatment for high triglycerides. Will f/u with pt regarding results and POC. Continue ASA 81 mg daily as cardiology recommended. -     Lipid Panel      Return in about 1 year (around 2/15/2023) for Annual Physical.       An electronic signature was used to authenticate this note.     --Carlo Villatoro, AMANDA - CNP on 2/15/2022 at 8:44 AM

## 2022-02-16 DIAGNOSIS — E78.1 HYPERTRIGLYCERIDEMIA: Primary | ICD-10-CM

## 2022-02-16 LAB — LDL CHOLESTEROL DIRECT: 102 MG/DL

## 2022-02-16 RX ORDER — FENOFIBRATE 48 MG/1
48 TABLET, COATED ORAL DAILY
Qty: 30 TABLET | Refills: 2 | Status: SHIPPED | OUTPATIENT
Start: 2022-02-16 | End: 2022-04-25

## 2022-02-24 ENCOUNTER — INITIAL CONSULT (OUTPATIENT)
Dept: SURGERY | Age: 42
End: 2022-02-24
Payer: COMMERCIAL

## 2022-02-24 VITALS
WEIGHT: 230 LBS | HEART RATE: 74 BPM | TEMPERATURE: 97.8 F | SYSTOLIC BLOOD PRESSURE: 122 MMHG | BODY MASS INDEX: 32.2 KG/M2 | DIASTOLIC BLOOD PRESSURE: 78 MMHG | HEIGHT: 71 IN

## 2022-02-24 DIAGNOSIS — Z87.19 HISTORY OF UMBILICAL HERNIA: Primary | ICD-10-CM

## 2022-02-24 PROCEDURE — G8427 DOCREV CUR MEDS BY ELIG CLIN: HCPCS | Performed by: SURGERY

## 2022-02-24 PROCEDURE — G8417 CALC BMI ABV UP PARAM F/U: HCPCS | Performed by: SURGERY

## 2022-02-24 PROCEDURE — 99203 OFFICE O/P NEW LOW 30 MIN: CPT | Performed by: SURGERY

## 2022-02-24 PROCEDURE — G8484 FLU IMMUNIZE NO ADMIN: HCPCS | Performed by: SURGERY

## 2022-02-24 NOTE — PROGRESS NOTES
Department of General Surgery Consult    PATIENT NAME: Padma Hernandez   YOB: 1980    ADMISSION DATE: No admission date for patient encounter. TODAY'S DATE: 2022    Reason for Consult:  Umbilical hernia     Chief Complaint: repeat umbilical hernia    Requesting Physician:  Hattie Llanes     HISTORY OF PRESENT ILLNESS:              The patient is a 39 y.o. male who presents with a second umbilical hernia after repair done from a doctor in [place] about 10 years ago. He had a mesh repair with good results and no problems again until about one year ago. Admits to pain with overeating and abdominal exercise. Does not report any change in size but it has reappeared in the last year. Reducible hernia. Past Medical History:        Diagnosis Date    AA (alcohol abuse)     Depression        Past Surgical History:        Procedure Laterality Date    HERNIA REPAIR         Current Medications:   No current facility-administered medications for this visit. Prior to Admission medications    Medication Sig Start Date End Date Taking? Authorizing Provider   fenofibrate (TRICOR) 48 MG tablet Take 1 tablet by mouth daily 22  Yes AMANDA Carter CNP   traZODone (DESYREL) 100 MG tablet TAKE 1 TABLET BY MOUTH DAILY AT NIGHT 21  Yes AMANDA Butts CNP   aspirin 81 MG EC tablet Take 81 mg by mouth daily   Yes Historical Provider, MD        Allergies:  Patient has no known allergies.     Social History:   Social History     Tobacco Use    Smoking status: Former Smoker     Packs/day: 1.00     Years: 14.00     Pack years: 14.00     Start date: 1995     Quit date: 2020     Years since quittin.6    Smokeless tobacco: Former User     Types: Chew   Vaping Use    Vaping Use: Never used   Substance Use Topics    Alcohol use: Not Currently     Comment: recovering alcoholic     Drug use: Not Currently     Types: 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     Family History:        Problem Relation Age of Onset    Alcohol Abuse Maternal Grandfather     Heart Attack Paternal Grandfather     Alcohol Abuse Paternal Grandfather        REVIEW OF SYSTEMS:  CONSTITUTIONAL:  negative  HEENT:  negative  RESPIRATORY:  negative  CARDIOVASCULAR:  negative  GASTROINTESTINAL:  negative except for abdominal pain and mass  GENITOURINARY:  negative  HEMATOLOGIC/LYMPHATIC:  negative  NEUROLOGICAL:  Negative  * All other ROS reviewed and negative. PHYSICAL EXAM:  VITALS:  /78   Pulse 74   Temp 97.8 °F (36.6 °C)   Ht 5' 11\" (1.803 m)   Wt 230 lb (104.3 kg)   BMI 32.08 kg/m²         CONSTITUTIONAL:  alert, no apparent distress and normal weight  EYES:  PERRL, sclera clear  ENT:  Normocephalic,atraumatic, without obvious abnormality  NECK:  supple, symmetrical, trachea midline  LUNGS: Resp effort easy and unlabored  CARDIOVASCULAR:  NO JVD  ABDOMEN:  scars noted epigastric,  soft, non-distended, non-tender, , no masses palpated and hernia present above umbilicus, 3cm at opening   MUSCULOSKELETAL: No clubbing or cyanosis  NEUROLOGIC:  Mental Status Exam:  Level of Alertness:   awake  PSYCHIATRIC:   person, place, time  SKIN:  normal skin color, texture, turgor    DATA:    CBC: No results for input(s): WBC, HGB, HCT, PLT in the last 72 hours. BMP:  No results for input(s): NA, K, CL, CO2, BUN, CREATININE, GLUCOSE in the last 72 hours. Hepatic: No results for input(s): AST, ALT, ALB, BILITOT, ALKPHOS in the last 72 hours. Mag:    No results for input(s): MG in the last 72 hours. Phos:   No results for input(s): PHOS in the last 72 hours. INR: No results for input(s): INR in the last 72 hours. IMPRESSION/RECOMMENDATIONS:    umbilical hernia (repeat)  - suregical options discussed along with recovery time, complications and benefits. - pt elects to proceed with robotic mesh repair in April timeframe.     Electronically signed by Scotty Olga, [address] [facility]  [id number]    Patient seen and agree with above and more than half of the total time was spent by me on the encounter. Some tenderness and increased size of bulge at umbilicus. Prior repair ten years ago. No nausea or emesis. Discussed their diagnosis and indications for operation. Risks of operation and typical recovery reviewed. Plan for OR soon.       Yvette Sorto MD

## 2022-03-03 ENCOUNTER — TELEPHONE (OUTPATIENT)
Dept: SURGERY | Age: 42
End: 2022-03-03

## 2022-03-03 NOTE — LETTER
Surgery Scheduling Form:  DEMOGRAPHICS:                                                                                                         .  Patient Name:  Juvenal Khan  Patient :  1980   Patient SS#:      Patient Phone:  [phone] (home)                         Alt. Patient Phone:                     Patient Address:  [address] [place] [address]  PCP:  AMANDA Pascual CNP  Insurance:  Payor: Vikram Anger / Plan: Luda Escort / Product Type: *No Product type* /        Insurance ID Number:    Payor/Plan Subscr  Sex Relation Sub. Ins. ID Effective Group Num   1. [employer] - * JESUS SANDHU* 1980 Male Self [id number] 10/1/19 [facility] [address]     Interpretor Needed:  (NO)  (TYPE)           LATEX ALLERGY:  (NO)  Allergies: No known drug allergies  Defibulator or Pacemaker:  (NO)    DIAGNOSIS & PROCEDURE:                                                                                       .  Diagnosis Code/Description:   Umbilical hernia X87.7  Operation Code/Description:  Robotic umbilical hernia repair with mesh possible open 05506  Location:  [facility]        Surgeon:  Dr. Art Valderrama:                                                                                    .  Surgeon's Scheduling Instruction:  elective  Requested Date: 22     OR Time: 8 am            Patient Arrival Time: 6 am  OR Time Required:  120  Minutes  Anesthesia:  General       Equipment:  Mesh robot                                                           SA Required (only for Mac and Gen): yes  Status:  Outpatient        Standard C-Arm (only for port and yecenia):  n/a   Mini C-Arm: No   PAT Required:   Yes                                          H&P needs to be completed: yes  Cardiac Clearance Requested:  (NO)               PRE-CERTIFICATION INFORMATION: Emanuel Henry   Procedure/CPT code:  Robotic umbilical hernia repair with mesh possible open 78451        Modifier:

## 2022-03-03 NOTE — TELEPHONE ENCOUNTER
Called patient, scheduled for dv umb mesh on 4/13/22 at 8 am with an arrival of 6 am. Informed the patient that PAT will also call with further instructions. Patient verbally states that he/she understands pre-op instructions. Patient notified of pre-op instructions for general/mac anesthesia:    *NPO after midnight     *H&P prior to surgery     *Cardiac clearance, if needed. - n/a    *Stop all blood thinners, if needed. - n/a    *Will need a     *Call the office with any further questions. *COVID testing 5-7 days priors to procedure surgery. *Procedure/Surgery time at this point is tentative time as PAT will confirm arrival time.

## 2022-03-10 DIAGNOSIS — E78.1 HYPERTRIGLYCERIDEMIA: ICD-10-CM

## 2022-03-10 RX ORDER — FENOFIBRATE 48 MG/1
TABLET, COATED ORAL
Qty: 30 TABLET | Refills: 2 | OUTPATIENT
Start: 2022-03-10

## 2022-03-10 NOTE — TELEPHONE ENCOUNTER
Refill Request     Last Seen: Last Seen Department: 2/15/2022  Last Seen by PCP: 2/15/2022    Last Written: 2/16/22 30 tablet 2 refill     Next Appointment: 4/19/22     Future Appointments   Date Time Provider Kinza Cerda   4/19/2022  9:00 AM Jody Braver, APRN - CNP [facility]   2/16/2023  8:00 AM Jody Braver, APRN - CNP [facility]       Future appointment scheduled      Requested Prescriptions     Pending Prescriptions Disp Refills    fenofibrate (TRICOR) 48 MG tablet [Pharmacy Med Name: FENOFIBRATE 48 MG TABLET] 30 tablet 2     Sig: TAKE 1 TABLET BY MOUTH EVERY DAY

## 2022-04-19 ENCOUNTER — OFFICE VISIT (OUTPATIENT)
Dept: FAMILY MEDICINE CLINIC | Age: 42
End: 2022-04-19
Payer: COMMERCIAL

## 2022-04-19 VITALS
WEIGHT: 229.2 LBS | BODY MASS INDEX: 31.97 KG/M2 | OXYGEN SATURATION: 95 % | DIASTOLIC BLOOD PRESSURE: 66 MMHG | SYSTOLIC BLOOD PRESSURE: 108 MMHG | HEART RATE: 75 BPM

## 2022-04-19 DIAGNOSIS — R07.9 CHEST PAIN, UNSPECIFIED TYPE: ICD-10-CM

## 2022-04-19 DIAGNOSIS — Z72.0 TOBACCO USE: ICD-10-CM

## 2022-04-19 DIAGNOSIS — E78.2 MIXED HYPERLIPIDEMIA: Primary | ICD-10-CM

## 2022-04-19 DIAGNOSIS — E78.1 HYPERTRIGLYCERIDEMIA: ICD-10-CM

## 2022-04-19 PROBLEM — R07.2 PRECORDIAL PAIN: Status: RESOLVED | Noted: 2020-12-16 | Resolved: 2022-04-19

## 2022-04-19 PROBLEM — R06.02 SOB (SHORTNESS OF BREATH): Status: RESOLVED | Noted: 2020-12-16 | Resolved: 2022-04-19

## 2022-04-19 LAB
A/G RATIO: 1.5 (ref 1.1–2.2)
ALBUMIN SERPL-MCNC: 4.4 G/DL (ref 3.4–5)
ALP BLD-CCNC: 83 U/L (ref 40–129)
ALT SERPL-CCNC: 20 U/L (ref 10–40)
ANION GAP SERPL CALCULATED.3IONS-SCNC: 14 MMOL/L (ref 3–16)
AST SERPL-CCNC: 18 U/L (ref 15–37)
BILIRUB SERPL-MCNC: 0.3 MG/DL (ref 0–1)
BUN BLDV-MCNC: 10 MG/DL (ref 7–20)
CALCIUM SERPL-MCNC: 9.7 MG/DL (ref 8.3–10.6)
CHLORIDE BLD-SCNC: 100 MMOL/L (ref 99–110)
CHOLESTEROL, FASTING: 269 MG/DL (ref 0–199)
CO2: 23 MMOL/L (ref 21–32)
CREAT SERPL-MCNC: 1.2 MG/DL (ref 0.9–1.3)
GFR AFRICAN AMERICAN: >60
GFR NON-AFRICAN AMERICAN: >60
GLUCOSE BLD-MCNC: 102 MG/DL (ref 70–99)
HDLC SERPL-MCNC: 33 MG/DL (ref 40–60)
LDL CHOLESTEROL CALCULATED: ABNORMAL MG/DL
LDL CHOLESTEROL DIRECT: 131 MG/DL
POTASSIUM SERPL-SCNC: 4.7 MMOL/L (ref 3.5–5.1)
SODIUM BLD-SCNC: 137 MMOL/L (ref 136–145)
TOTAL PROTEIN: 7.3 G/DL (ref 6.4–8.2)
TRIGLYCERIDE, FASTING: 373 MG/DL (ref 0–150)
VLDLC SERPL CALC-MCNC: ABNORMAL MG/DL

## 2022-04-19 PROCEDURE — 1036F TOBACCO NON-USER: CPT | Performed by: NURSE PRACTITIONER

## 2022-04-19 PROCEDURE — 99213 OFFICE O/P EST LOW 20 MIN: CPT | Performed by: NURSE PRACTITIONER

## 2022-04-19 PROCEDURE — G8427 DOCREV CUR MEDS BY ELIG CLIN: HCPCS | Performed by: NURSE PRACTITIONER

## 2022-04-19 PROCEDURE — G8417 CALC BMI ABV UP PARAM F/U: HCPCS | Performed by: NURSE PRACTITIONER

## 2022-04-19 RX ORDER — VARENICLINE TARTRATE
KIT
Qty: 1 BOX | Refills: 0 | Status: SHIPPED | OUTPATIENT
Start: 2022-04-19 | End: 2022-04-26 | Stop reason: RX

## 2022-04-19 ASSESSMENT — ENCOUNTER SYMPTOMS
GASTROINTESTINAL NEGATIVE: 1
RESPIRATORY NEGATIVE: 1
EYES NEGATIVE: 1

## 2022-04-21 ENCOUNTER — TELEPHONE (OUTPATIENT)
Dept: FAMILY MEDICINE CLINIC | Age: 42
End: 2022-04-21

## 2022-04-21 NOTE — TELEPHONE ENCOUNTER
Pharmacy sent a fax stating that chantix starter month box brand name is not available. Generic does not come in a starter pack.  Please advise

## 2022-04-22 DIAGNOSIS — E78.1 HYPERTRIGLYCERIDEMIA: Primary | ICD-10-CM

## 2022-04-22 DIAGNOSIS — E78.2 MIXED HYPERLIPIDEMIA: ICD-10-CM

## 2022-04-22 DIAGNOSIS — Z72.0 TOBACCO USE: ICD-10-CM

## 2022-04-22 NOTE — TELEPHONE ENCOUNTER
Refill Request     Last Seen: Last Seen Department: 4/19/2022  Last Seen by PCP: 4/19/2022    Last Written: 4/19/2022    Next Appointment:   Future Appointments   Date Time Provider Kinza Cerda   2/16/2023  8:00 AM AMANDA Sheppard - CNP [facility]       Future appointment scheduled      Requested Prescriptions     Pending Prescriptions Disp Refills    varenicline (CHANTIX STARTING MONTH SHARRI) 0.5 MG X 11 & 1 MG X 42 tablet 1 box 0     Sig: Take by mouth.

## 2022-04-22 NOTE — TELEPHONE ENCOUNTER
Called [facility] pharmacy and pharmacist stated she can dispense the varenicline, just not as the starter pack, and would need new prescription sent to them as such.

## 2022-04-25 DIAGNOSIS — E78.1 HYPERTRIGLYCERIDEMIA: ICD-10-CM

## 2022-04-25 RX ORDER — FENOFIBRATE 48 MG/1
TABLET, COATED ORAL
Qty: 30 TABLET | Refills: 2 | Status: SHIPPED | OUTPATIENT
Start: 2022-04-25 | End: 2022-08-26

## 2022-04-25 RX ORDER — VARENICLINE TARTRATE
KIT
Qty: 1 BOX | Refills: 0 | OUTPATIENT
Start: 2022-04-25

## 2022-04-25 NOTE — TELEPHONE ENCOUNTER
Refill Request     Last Seen: Last Seen Department: 4/19/2022  Last Seen by PCP: 4/19/2022    Last Written: 2/16/2022, #30, 2 refills     Next Appointment:   Future Appointments   Date Time Provider Kinza Cerda   2/16/2023  8:00 AM Yolie Maria Eugenia, APRN - CNP [facility] [place]       Future appointment scheduled      Requested Prescriptions     Pending Prescriptions Disp Refills    fenofibrate (TRICOR) 48 MG tablet [Pharmacy Med Name: FENOFIBRATE 48 MG TABLET] 30 tablet 2     Sig: TAKE 1 TABLET BY MOUTH EVERY DAY

## 2022-04-26 DIAGNOSIS — Z71.6 ENCOUNTER FOR SMOKING CESSATION COUNSELING: Primary | ICD-10-CM

## 2022-04-26 RX ORDER — VARENICLINE TARTRATE 0.5 MG/1
TABLET, FILM COATED ORAL
Qty: 90 TABLET | Refills: 1 | Status: SHIPPED | OUTPATIENT
Start: 2022-04-26 | End: 2022-05-23

## 2022-05-15 DIAGNOSIS — G47.00 INSOMNIA, UNSPECIFIED TYPE: ICD-10-CM

## 2022-05-16 RX ORDER — TRAZODONE HYDROCHLORIDE 100 MG/1
TABLET ORAL
Qty: 90 TABLET | Refills: 1 | Status: SHIPPED | OUTPATIENT
Start: 2022-05-16

## 2022-05-16 NOTE — TELEPHONE ENCOUNTER
Refill Request     CONFIRM preferrred pharmacy with the patient. If Mail Order Rx - Pend for 90 day refill.       Last Seen: Last Seen Department: 4/19/2022  Last Seen by PCP: 4/19/2022    Last Written: 11/8/2021    Next Appointment:   Future Appointments   Date Time Provider Kinza Cerda   5/25/2022  2:15 PM MD Guilherme Baumann   2/16/2023  8:00 AM Depoe Bay Prey, APRN - CNP [facility] [place]       Future appointment scheduled      Requested Prescriptions     Pending Prescriptions Disp Refills    traZODone (DESYREL) 100 MG tablet [Pharmacy Med Name: TRAZODONE 100 MG TABLET] 90 tablet 1     Sig: TAKE 1 TABLET BY MOUTH DAILY AT NIGHT

## 2022-05-21 DIAGNOSIS — Z71.6 ENCOUNTER FOR SMOKING CESSATION COUNSELING: ICD-10-CM

## 2022-05-23 NOTE — TELEPHONE ENCOUNTER
Refill Request     CONFIRM preferrred pharmacy with the patient. If Mail Order Rx - Pend for 90 day refill. Last Seen: Last Seen Department: 4/19/2022  Last Seen by PCP: 4/19/2022    Last Written: 4/26/2022    Next Appointment:   Future Appointments   Date Time Provider Kinza Cerda   5/25/2022  2:15 PM MD Brandie Cali   2/16/2023  8:00 AM AMANDA Naidu CNP  [facility] [place]       Future appointment scheduled      Requested Prescriptions     Pending Prescriptions Disp Refills    varenicline (CHANTIX) 0.5 MG tablet [Pharmacy Med Name: VARENICLINE 0.5 MG TABLET] 90 tablet 1     Sig: START MED & QUIT ON DAY 8. DAYS 1-3: 0.5 MG ONCE/DAY DAYS 4-7 0.5 MG 2X/DAY DAY 8 AND LATER, 1 MG 2X/DAY.  CONTINUE MAINTENANCE DOSE FOR AT LEAST 11 WEEKS (TOTAL OF AT LEAST 12 WEEKS OF TREATMENT)

## 2022-05-24 RX ORDER — VARENICLINE TARTRATE 0.5 MG/1
TABLET, FILM COATED ORAL
Qty: 90 TABLET | Refills: 1 | Status: SHIPPED | OUTPATIENT
Start: 2022-05-24 | End: 2022-05-25 | Stop reason: ALTCHOICE

## 2022-05-25 ENCOUNTER — TELEPHONE (OUTPATIENT)
Dept: FAMILY MEDICINE CLINIC | Age: 42
End: 2022-05-25

## 2022-05-25 DIAGNOSIS — Z71.6 ENCOUNTER FOR SMOKING CESSATION COUNSELING: Primary | ICD-10-CM

## 2022-05-25 RX ORDER — VARENICLINE TARTRATE
KIT
Qty: 1 BOX | Refills: 0 | Status: SHIPPED | OUTPATIENT
Start: 2022-05-25 | End: 2022-10-02

## 2022-05-25 NOTE — TELEPHONE ENCOUNTER
Fax received from [facility] regarding prescription for Chantix. I called pharmacy and tech stated the reason in needs to be changed is because insurance will not cover as written. I will scan fax request in patient's media/chart for review. Alternative request is for starting month box and then for 1 mg BID.

## 2022-06-24 NOTE — PROGRESS NOTES
[address]   Cardiac Followup    Referring Provider:  AMANDA Saucedo CNP     Chief Complaint   Patient presents with    Follow-up    Hyperlipidemia     to be seen for high cholesterol      Adán Hodges   1980    History of Present Illness:    Adán Hodges is a 43 y.o. male who is here today at the request of AMANDA Saucedo CNP for hyperlipidemia. He was initially seen as a new patient referral from [facility] for chest pain in 2020. He presented to the ER on 12/11/2020 with chest pain with an D-Dimer elevated. CT chest showed no PE, small pericardial effusion. He had a normal stress echocardiogram on 12/24/2020. Today he states he is here at the request of his AMANDA Saucedo CNP to follow up on his cholesterol and family history of coronary artery disease. He states he has been feeling well since his last visit. Blood pressure is well controlled. He is tolerating his medications and taking them as prescribed. Tolerating fenofibrate. He states he stays active with no issues. Patient currently denies any weight gain, edema, palpitations, chest pain, shortness of breath, dizziness, and syncope. Past Medical History:   has a past medical history of AA (alcohol abuse), Depression, and Hyperlipidemia. Surgical History:   has a past surgical history that includes hernia repair. Social History:   reports that he quit smoking about 1 years ago. He started smoking about 27 years ago. He has a 14.00 pack-year smoking history. He has quit using smokeless tobacco.  His smokeless tobacco use included chew. He reports previous alcohol use. He reports previous drug use. Drug: Methamphetamines (Crystal Meth). Family History:  family history includes Alcohol Abuse in his maternal grandfather and paternal grandfather; Heart Attack in his paternal grandfather. Home Medications:  Prior to Admission medications    Medication Sig Start Date End Date Taking? Authorizing Provider   varenicline (CHANTIX STARTING MONTH PAK) 0.5 MG X 11 & 1 MG X 42 tablet Take by mouth. 5/25/22  Yes AMANDA Oneal CNP   traZODone (DESYREL) 100 MG tablet TAKE 1 TABLET BY MOUTH DAILY AT NIGHT 5/16/22  Yes AMANDA Barbosa - CNP   fenofibrate (TRICOR) 48 MG tablet TAKE 1 TABLET BY MOUTH EVERY DAY 4/25/22  Yes AMANDA Cheung   aspirin 81 MG EC tablet Take 81 mg by mouth daily   Yes Historical Provider, MD        Allergies:  Patient has no known allergies. Review of Systems:   · Constitutional: there has been no unanticipated weight loss. There's been no change in energy level, sleep pattern, or activity level. · Eyes: No visual changes or diplopia. No scleral icterus. · ENT: No Headaches, hearing loss or vertigo. No mouth sores or sore throat. · Cardiovascular: Reviewed in HPI  · Respiratory: No cough or wheezing, no sputum production. No hematemesis. · Gastrointestinal: No abdominal pain, appetite loss, blood in stools. No change in bowel or bladder habits. · Genitourinary: No dysuria, trouble voiding, or hematuria. · Musculoskeletal:  No gait disturbance, weakness or joint complaints. · Integumentary: No rash or pruritis. · Neurological: No headache, diplopia, change in muscle strength, numbness or tingling. No change in gait, balance, coordination, mood, affect, memory, mentation, behavior. · Psychiatric: No anxiety, no depression. · Endocrine: No malaise, fatigue or temperature intolerance. No excessive thirst, fluid intake, or urination. No tremor. · Hematologic/Lymphatic: No abnormal bruising or bleeding, blood clots or swollen lymph nodes. · Allergic/Immunologic: No nasal congestion or hives.     Physical Examination:    Vitals:    06/27/22 0736   BP: 112/64   Pulse: 78   SpO2: 96%        Constitutional and General Appearance: NAD   Respiratory:  · Normal excursion and expansion without use of accessory muscles  · Resp Auscultation: Normal breath sounds without dullness  Cardiovascular:  · The apical impulses not displaced  · Heart tones are crisp and normal  · Cervical veins are not engorged  · The carotid upstroke is normal in amplitude and contour without delay or bruit  · Normal S1S2, No S3, No Murmur  · Peripheral pulses are symmetrical and full  · There is no clubbing, cyanosis of the extremities. · No edema  · Femoral Arteries: 2+ and equal  · Pedal Pulses: 2+ and equal   Abdomen:  · No masses or tenderness  · Liver/Spleen: No Abnormalities Noted  Neurological/Psychiatric:  · Alert and oriented in all spheres  · Moves all extremities well  · Exhibits normal gait balance and coordination  · No abnormalities of mood, affect, memory, mentation, or behavior are noted    CT chest 12/11/2020  1. No evidence of pulmonary embolism 2. Small pericardial effusion 3. Multifocal atelectasis     EKG 12/2020  NSR    Stress echocardiogram 12/24/2020   Summary   Baseline resting echocardiogram shows normal global LV systolic function   with an ejection fraction of 55% and uniform myocardial segmental wall   motion. Following stress there was uniform augmentation of all myocardial   segments with appropriate hyperdynamic LV systolic response to stress with   an ejection fraction of 70%. No evidence of aortic valve regurgitation or stenosis. No evidence of   significant mitral valve regurgitation or stenosis. Normal stress ECHO      Assessment:   Small pericardial effusion by chest CT. Not noted on stress echo. No symptoms. Family HX of heart diease in grandfather- CABG surgery   Elevated triglycerides - improved on fenofibrate  Elevated LDL - reviewed w/ pt. His risen  Former smoker - continued cessation discussed     Plan: You can try adding over the counter fish oil to your medication regimen to help with triglycerides  Recommend a CT calcium score which is a test used as a screening tool for coronary artery disease.  If the score is above 0, then would recommend Aspirin and Statin therapy. This test is considered a screening tool so it is not covered by insurance. Cardiac medications reviewed including indications and pertinent side effects. Medication list updated at this visit. Check blood pressure and heart rate at home a few times per week- keep a log with dates and times and bring to office visit   Regular exercise and following a healthy diet encouraged. Low carb recommended    Follow up with me as needed if testing is normal        Scribe's attestation: This note was scribed in the presence of Dr. Zen Laguna M.D. By Chapin Saavedra RN    The scribes documentation has been prepared under my direction and personally reviewed by me in its entirety. I confirm that the note above accurately reflects all work, treatment, procedures, and medical decision making performed by me. Dr. Zen Laguna MD    Thank you for allowing me to participate in the care of this individual.      Gregoria Adam.  Julian Oropeza M.D., Saba Freitas

## 2022-06-27 ENCOUNTER — OFFICE VISIT (OUTPATIENT)
Dept: CARDIOLOGY CLINIC | Age: 42
End: 2022-06-27
Payer: COMMERCIAL

## 2022-06-27 VITALS
SYSTOLIC BLOOD PRESSURE: 112 MMHG | BODY MASS INDEX: 32.48 KG/M2 | OXYGEN SATURATION: 96 % | HEIGHT: 71 IN | DIASTOLIC BLOOD PRESSURE: 64 MMHG | HEART RATE: 78 BPM | WEIGHT: 232 LBS

## 2022-06-27 DIAGNOSIS — E78.2 MIXED HYPERLIPIDEMIA: Primary | ICD-10-CM

## 2022-06-27 DIAGNOSIS — Z76.89 ENCOUNTER TO ESTABLISH CARE: ICD-10-CM

## 2022-06-27 DIAGNOSIS — I10 PRIMARY HYPERTENSION: ICD-10-CM

## 2022-06-27 DIAGNOSIS — Z82.49 FAMILY HISTORY OF HEART DISEASE: ICD-10-CM

## 2022-06-27 PROCEDURE — G8417 CALC BMI ABV UP PARAM F/U: HCPCS | Performed by: INTERNAL MEDICINE

## 2022-06-27 PROCEDURE — G8427 DOCREV CUR MEDS BY ELIG CLIN: HCPCS | Performed by: INTERNAL MEDICINE

## 2022-06-27 PROCEDURE — 93000 ELECTROCARDIOGRAM COMPLETE: CPT | Performed by: INTERNAL MEDICINE

## 2022-06-27 PROCEDURE — 1036F TOBACCO NON-USER: CPT | Performed by: INTERNAL MEDICINE

## 2022-06-27 PROCEDURE — 99214 OFFICE O/P EST MOD 30 MIN: CPT | Performed by: INTERNAL MEDICINE

## 2022-06-27 NOTE — PATIENT INSTRUCTIONS
Plan:  You can try adding over the counter fish oil to your medication regimen to help with triglycerides  Recommend a CT calcium score which is a test used as a screening tool for coronary artery disease. If the score is above 0, then would recommend Aspirin and Statin therapy. This test is considered a screening tool so it is not covered by insurance. Cardiac medications reviewed including indications and pertinent side effects. Medication list updated at this visit. Check blood pressure and heart rate at home a few times per week- keep a log with dates and times and bring to office visit   Regular exercise and following a healthy diet encouraged   Follow up with me as needed if testing is normal   Your provider has ordered testing for further evaluation. An order/prescription has been included in your paper work.  To schedule outpatient testing, contact Central Scheduling by calling [address] [place], [place] [id number] [employer] ([phone]). Home

## 2022-08-26 DIAGNOSIS — E78.1 HYPERTRIGLYCERIDEMIA: ICD-10-CM

## 2022-08-26 RX ORDER — FENOFIBRATE 48 MG/1
TABLET, COATED ORAL
Qty: 90 TABLET | Refills: 1 | Status: SHIPPED | OUTPATIENT
Start: 2022-08-26

## 2022-08-26 NOTE — TELEPHONE ENCOUNTER
.Refill Request     CONFIRM preferrred pharmacy with the patient. If Mail Order Rx - Pend for 90 day refill.       Last Seen: Last Seen Department: 4/19/2022  Last Seen by PCP: 4/19/2022    Last Written: 4-25-22 30 with 2     Next Appointment:   Future Appointments   Date Time Provider Kinza Cerda   2/16/2023  8:00 AM AMANDA Evans - CNP [facility]  [place] [facility]       Future appointment scheduled      Requested Prescriptions     Pending Prescriptions Disp Refills    fenofibrate (TRICOR) 48 MG tablet [Pharmacy Med Name: FENOFIBRATE 48 MG TABLET] 90 tablet 1     Sig: TAKE 1 TABLET BY MOUTH EVERY DAY

## 2022-09-13 ENCOUNTER — TELEPHONE (OUTPATIENT)
Dept: SURGERY | Age: 42
End: 2022-09-13

## 2022-09-13 NOTE — TELEPHONE ENCOUNTER
Pt called in to cancel surgery for tomorrow (9/14/22) and to reschedule.      Please call [phone]

## 2022-10-02 ENCOUNTER — APPOINTMENT (OUTPATIENT)
Dept: CT IMAGING | Age: 42
DRG: 419 | End: 2022-10-02
Payer: COMMERCIAL

## 2022-10-02 ENCOUNTER — HOSPITAL ENCOUNTER (INPATIENT)
Age: 42
LOS: 5 days | Discharge: HOME | DRG: 419 | End: 2022-10-07
Attending: INTERNAL MEDICINE | Admitting: INTERNAL MEDICINE
Payer: COMMERCIAL

## 2022-10-02 ENCOUNTER — APPOINTMENT (OUTPATIENT)
Dept: GENERAL RADIOLOGY | Age: 42
DRG: 419 | End: 2022-10-02
Payer: COMMERCIAL

## 2022-10-02 ENCOUNTER — HOSPITAL ENCOUNTER (EMERGENCY)
Age: 42
Discharge: HOME | DRG: 419 | End: 2022-10-02
Attending: EMERGENCY MEDICINE
Payer: COMMERCIAL

## 2022-10-02 VITALS
HEIGHT: 72 IN | WEIGHT: 230 LBS | BODY MASS INDEX: 31.15 KG/M2 | RESPIRATION RATE: 17 BRPM | DIASTOLIC BLOOD PRESSURE: 77 MMHG | SYSTOLIC BLOOD PRESSURE: 135 MMHG | HEART RATE: 66 BPM | OXYGEN SATURATION: 98 % | TEMPERATURE: 97.7 F

## 2022-10-02 DIAGNOSIS — K81.0 ACUTE CHOLECYSTITIS: ICD-10-CM

## 2022-10-02 DIAGNOSIS — N42.0: ICD-10-CM

## 2022-10-02 DIAGNOSIS — K42.9 PERIUMBILICAL HERNIA: ICD-10-CM

## 2022-10-02 DIAGNOSIS — R11.2 INTRACTABLE NAUSEA AND VOMITING: Primary | ICD-10-CM

## 2022-10-02 DIAGNOSIS — K81.9 CHOLECYSTITIS: ICD-10-CM

## 2022-10-02 DIAGNOSIS — K29.00 ACUTE GASTRITIS WITHOUT HEMORRHAGE, UNSPECIFIED GASTRITIS TYPE: Primary | ICD-10-CM

## 2022-10-02 LAB
A/G RATIO: 1.3 (ref 1.1–2.2)
A/G RATIO: 1.5 (ref 1.1–2.2)
ALBUMIN SERPL-MCNC: 4.2 G/DL (ref 3.4–5)
ALBUMIN SERPL-MCNC: 4.5 G/DL (ref 3.4–5)
ALP BLD-CCNC: 82 U/L (ref 40–129)
ALP BLD-CCNC: 89 U/L (ref 40–129)
ALT SERPL-CCNC: 30 U/L (ref 10–40)
ALT SERPL-CCNC: 34 U/L (ref 10–40)
AMPHETAMINE SCREEN, URINE: NORMAL
ANION GAP SERPL CALCULATED.3IONS-SCNC: 10 MMOL/L (ref 3–16)
ANION GAP SERPL CALCULATED.3IONS-SCNC: 12 MMOL/L (ref 3–16)
AST SERPL-CCNC: 23 U/L (ref 15–37)
AST SERPL-CCNC: 26 U/L (ref 15–37)
BARBITURATE SCREEN URINE: NORMAL
BASOPHILS ABSOLUTE: 0.1 K/UL (ref 0–0.2)
BASOPHILS ABSOLUTE: 0.1 K/UL (ref 0–0.2)
BASOPHILS RELATIVE PERCENT: 0.5 %
BASOPHILS RELATIVE PERCENT: 0.9 %
BENZODIAZEPINE SCREEN, URINE: NORMAL
BILIRUB SERPL-MCNC: 0.4 MG/DL (ref 0–1)
BILIRUB SERPL-MCNC: 0.5 MG/DL (ref 0–1)
BILIRUBIN URINE: NEGATIVE
BLOOD, URINE: NEGATIVE
BUN BLDV-MCNC: 11 MG/DL (ref 7–20)
BUN BLDV-MCNC: 13 MG/DL (ref 7–20)
CALCIUM SERPL-MCNC: 9 MG/DL (ref 8.3–10.6)
CALCIUM SERPL-MCNC: 9 MG/DL (ref 8.3–10.6)
CANNABINOID SCREEN URINE: NORMAL
CHLORIDE BLD-SCNC: 98 MMOL/L (ref 99–110)
CHLORIDE BLD-SCNC: 99 MMOL/L (ref 99–110)
CLARITY: CLEAR
CO2: 22 MMOL/L (ref 21–32)
CO2: 28 MMOL/L (ref 21–32)
COCAINE METABOLITE SCREEN URINE: NORMAL
COLOR: YELLOW
CREAT SERPL-MCNC: 1 MG/DL (ref 0.9–1.3)
CREAT SERPL-MCNC: 1.1 MG/DL (ref 0.9–1.3)
EKG ATRIAL RATE: 69 BPM
EKG DIAGNOSIS: NORMAL
EKG P AXIS: 35 DEGREES
EKG P-R INTERVAL: 154 MS
EKG Q-T INTERVAL: 392 MS
EKG QRS DURATION: 98 MS
EKG QTC CALCULATION (BAZETT): 420 MS
EKG R AXIS: 67 DEGREES
EKG T AXIS: 50 DEGREES
EKG VENTRICULAR RATE: 69 BPM
EOSINOPHILS ABSOLUTE: 0.2 K/UL (ref 0–0.6)
EOSINOPHILS ABSOLUTE: 0.3 K/UL (ref 0–0.6)
EOSINOPHILS RELATIVE PERCENT: 1.7 %
EOSINOPHILS RELATIVE PERCENT: 2.9 %
FENTANYL SCREEN, URINE: NORMAL
GFR AFRICAN AMERICAN: >60
GFR AFRICAN AMERICAN: >60
GFR NON-AFRICAN AMERICAN: >60
GFR NON-AFRICAN AMERICAN: >60
GLUCOSE BLD-MCNC: 128 MG/DL (ref 70–99)
GLUCOSE BLD-MCNC: 168 MG/DL (ref 70–99)
GLUCOSE URINE: NEGATIVE MG/DL
HCT VFR BLD CALC: 45.3 % (ref 40.5–52.5)
HCT VFR BLD CALC: 45.6 % (ref 40.5–52.5)
HEMOGLOBIN: 15.6 G/DL (ref 13.5–17.5)
HEMOGLOBIN: 15.6 G/DL (ref 13.5–17.5)
INFLUENZA A: NOT DETECTED
INFLUENZA B: NOT DETECTED
KETONES, URINE: NEGATIVE MG/DL
LACTIC ACID: 2.1 MMOL/L (ref 0.4–2)
LEUKOCYTE ESTERASE, URINE: ABNORMAL
LIPASE: 19 U/L (ref 13–60)
LIPASE: 22 U/L (ref 13–60)
LYMPHOCYTES ABSOLUTE: 1.5 K/UL (ref 1–5.1)
LYMPHOCYTES ABSOLUTE: 2.1 K/UL (ref 1–5.1)
LYMPHOCYTES RELATIVE PERCENT: 10.8 %
LYMPHOCYTES RELATIVE PERCENT: 20 %
Lab: NORMAL
MCH RBC QN AUTO: 28.7 PG (ref 26–34)
MCH RBC QN AUTO: 28.9 PG (ref 26–34)
MCHC RBC AUTO-ENTMCNC: 34.1 G/DL (ref 31–36)
MCHC RBC AUTO-ENTMCNC: 34.3 G/DL (ref 31–36)
MCV RBC AUTO: 84.2 FL (ref 80–100)
MCV RBC AUTO: 84.3 FL (ref 80–100)
METHADONE SCREEN, URINE: NORMAL
MICROSCOPIC EXAMINATION: YES
MONOCYTES ABSOLUTE: 0.8 K/UL (ref 0–1.3)
MONOCYTES ABSOLUTE: 1 K/UL (ref 0–1.3)
MONOCYTES RELATIVE PERCENT: 7.7 %
MONOCYTES RELATIVE PERCENT: 7.7 %
NEUTROPHILS ABSOLUTE: 10.7 K/UL (ref 1.7–7.7)
NEUTROPHILS ABSOLUTE: 7.2 K/UL (ref 1.7–7.7)
NEUTROPHILS RELATIVE PERCENT: 68.5 %
NEUTROPHILS RELATIVE PERCENT: 79.3 %
NITRITE, URINE: NEGATIVE
OPIATE SCREEN URINE: NORMAL
OXYCODONE URINE: NORMAL
PDW BLD-RTO: 14 % (ref 12.4–15.4)
PDW BLD-RTO: 14 % (ref 12.4–15.4)
PH UA: 5.5
PH UA: 5.5 (ref 5–8)
PHENCYCLIDINE SCREEN URINE: NORMAL
PLATELET # BLD: 201 K/UL (ref 135–450)
PLATELET # BLD: 227 K/UL (ref 135–450)
PMV BLD AUTO: 8.3 FL (ref 5–10.5)
PMV BLD AUTO: 8.9 FL (ref 5–10.5)
POTASSIUM REFLEX MAGNESIUM: 4.2 MMOL/L (ref 3.5–5.1)
POTASSIUM REFLEX MAGNESIUM: 4.2 MMOL/L (ref 3.5–5.1)
PROTEIN UA: NEGATIVE MG/DL
RBC # BLD: 5.39 M/UL (ref 4.2–5.9)
RBC # BLD: 5.41 M/UL (ref 4.2–5.9)
RBC UA: NORMAL /HPF (ref 0–4)
SARS-COV-2 RNA, RT PCR: NOT DETECTED
SODIUM BLD-SCNC: 133 MMOL/L (ref 136–145)
SODIUM BLD-SCNC: 136 MMOL/L (ref 136–145)
SPECIFIC GRAVITY UA: <=1.005 (ref 1–1.03)
TOTAL PROTEIN: 7.5 G/DL (ref 6.4–8.2)
TOTAL PROTEIN: 7.6 G/DL (ref 6.4–8.2)
TROPONIN: <0.01 NG/ML
TROPONIN: <0.01 NG/ML
URINE REFLEX TO CULTURE: ABNORMAL
URINE TYPE: ABNORMAL
UROBILINOGEN, URINE: 0.2 E.U./DL
WBC # BLD: 10.5 K/UL (ref 4–11)
WBC # BLD: 13.5 K/UL (ref 4–11)
WBC UA: NORMAL /HPF (ref 0–5)

## 2022-10-02 PROCEDURE — 2580000003 HC RX 258: Performed by: EMERGENCY MEDICINE

## 2022-10-02 PROCEDURE — 2580000003 HC RX 258: Performed by: INTERNAL MEDICINE

## 2022-10-02 PROCEDURE — 1200000000 HC SEMI PRIVATE

## 2022-10-02 PROCEDURE — 80053 COMPREHEN METABOLIC PANEL: CPT

## 2022-10-02 PROCEDURE — 83605 ASSAY OF LACTIC ACID: CPT

## 2022-10-02 PROCEDURE — 71260 CT THORAX DX C+: CPT | Performed by: EMERGENCY MEDICINE

## 2022-10-02 PROCEDURE — 80307 DRUG TEST PRSMV CHEM ANLYZR: CPT

## 2022-10-02 PROCEDURE — 93005 ELECTROCARDIOGRAM TRACING: CPT | Performed by: EMERGENCY MEDICINE

## 2022-10-02 PROCEDURE — 6360000004 HC RX CONTRAST MEDICATION: Performed by: EMERGENCY MEDICINE

## 2022-10-02 PROCEDURE — 6370000000 HC RX 637 (ALT 250 FOR IP): Performed by: EMERGENCY MEDICINE

## 2022-10-02 PROCEDURE — 36415 COLL VENOUS BLD VENIPUNCTURE: CPT

## 2022-10-02 PROCEDURE — 6360000002 HC RX W HCPCS: Performed by: EMERGENCY MEDICINE

## 2022-10-02 PROCEDURE — C9113 INJ PANTOPRAZOLE SODIUM, VIA: HCPCS | Performed by: INTERNAL MEDICINE

## 2022-10-02 PROCEDURE — 96374 THER/PROPH/DIAG INJ IV PUSH: CPT

## 2022-10-02 PROCEDURE — 96376 TX/PRO/DX INJ SAME DRUG ADON: CPT

## 2022-10-02 PROCEDURE — 6360000002 HC RX W HCPCS: Performed by: NURSE PRACTITIONER

## 2022-10-02 PROCEDURE — 83690 ASSAY OF LIPASE: CPT

## 2022-10-02 PROCEDURE — 81001 URINALYSIS AUTO W/SCOPE: CPT

## 2022-10-02 PROCEDURE — G0378 HOSPITAL OBSERVATION PER HR: HCPCS

## 2022-10-02 PROCEDURE — 84484 ASSAY OF TROPONIN QUANT: CPT

## 2022-10-02 PROCEDURE — 85025 COMPLETE CBC W/AUTO DIFF WBC: CPT

## 2022-10-02 PROCEDURE — 99285 EMERGENCY DEPT VISIT HI MDM: CPT

## 2022-10-02 PROCEDURE — 96372 THER/PROPH/DIAG INJ SC/IM: CPT

## 2022-10-02 PROCEDURE — 87636 SARSCOV2 & INF A&B AMP PRB: CPT

## 2022-10-02 PROCEDURE — 96375 TX/PRO/DX INJ NEW DRUG ADDON: CPT

## 2022-10-02 PROCEDURE — 6360000002 HC RX W HCPCS: Performed by: INTERNAL MEDICINE

## 2022-10-02 PROCEDURE — 71045 X-RAY EXAM CHEST 1 VIEW: CPT

## 2022-10-02 PROCEDURE — 6370000000 HC RX 637 (ALT 250 FOR IP): Performed by: INTERNAL MEDICINE

## 2022-10-02 PROCEDURE — 93010 ELECTROCARDIOGRAM REPORT: CPT | Performed by: INTERNAL MEDICINE

## 2022-10-02 PROCEDURE — 96361 HYDRATE IV INFUSION ADD-ON: CPT

## 2022-10-02 PROCEDURE — 74177 CT ABD & PELVIS W/CONTRAST: CPT

## 2022-10-02 PROCEDURE — 2580000003 HC RX 258: Performed by: NURSE PRACTITIONER

## 2022-10-02 RX ORDER — ACETAMINOPHEN 650 MG/1
650 SUPPOSITORY RECTAL EVERY 6 HOURS PRN
Status: DISCONTINUED | OUTPATIENT
Start: 2022-10-02 | End: 2022-10-07 | Stop reason: HOSPADM

## 2022-10-02 RX ORDER — 0.9 % SODIUM CHLORIDE 0.9 %
1000 INTRAVENOUS SOLUTION INTRAVENOUS ONCE
Status: COMPLETED | OUTPATIENT
Start: 2022-10-02 | End: 2022-10-02

## 2022-10-02 RX ORDER — FENOFIBRATE 54 MG/1
54 TABLET ORAL DAILY
Status: DISCONTINUED | OUTPATIENT
Start: 2022-10-02 | End: 2022-10-07 | Stop reason: HOSPADM

## 2022-10-02 RX ORDER — PROMETHAZINE HYDROCHLORIDE 25 MG/ML
6.25 INJECTION, SOLUTION INTRAMUSCULAR; INTRAVENOUS EVERY 6 HOURS PRN
Status: DISCONTINUED | OUTPATIENT
Start: 2022-10-02 | End: 2022-10-07 | Stop reason: HOSPADM

## 2022-10-02 RX ORDER — MAGNESIUM HYDROXIDE/ALUMINUM HYDROXICE/SIMETHICONE 120; 1200; 1200 MG/30ML; MG/30ML; MG/30ML
5 SUSPENSION ORAL EVERY 6 HOURS PRN
Qty: 1 EACH | Refills: 0 | Status: SHIPPED | OUTPATIENT
Start: 2022-10-02

## 2022-10-02 RX ORDER — KETOROLAC TROMETHAMINE 30 MG/ML
15 INJECTION, SOLUTION INTRAMUSCULAR; INTRAVENOUS EVERY 6 HOURS PRN
Status: DISCONTINUED | OUTPATIENT
Start: 2022-10-02 | End: 2022-10-07 | Stop reason: HOSPADM

## 2022-10-02 RX ORDER — SODIUM CHLORIDE 9 MG/ML
INJECTION, SOLUTION INTRAVENOUS CONTINUOUS
Status: DISCONTINUED | OUTPATIENT
Start: 2022-10-02 | End: 2022-10-02 | Stop reason: SDUPTHER

## 2022-10-02 RX ORDER — KETOROLAC TROMETHAMINE 30 MG/ML
15 INJECTION, SOLUTION INTRAMUSCULAR; INTRAVENOUS ONCE
Status: COMPLETED | OUTPATIENT
Start: 2022-10-02 | End: 2022-10-02

## 2022-10-02 RX ORDER — ONDANSETRON 4 MG/1
4 TABLET, ORALLY DISINTEGRATING ORAL EVERY 8 HOURS PRN
Status: DISCONTINUED | OUTPATIENT
Start: 2022-10-02 | End: 2022-10-07 | Stop reason: HOSPADM

## 2022-10-02 RX ORDER — ONDANSETRON 2 MG/ML
4 INJECTION INTRAMUSCULAR; INTRAVENOUS ONCE
Status: COMPLETED | OUTPATIENT
Start: 2022-10-02 | End: 2022-10-02

## 2022-10-02 RX ORDER — ENOXAPARIN SODIUM 100 MG/ML
30 INJECTION SUBCUTANEOUS 2 TIMES DAILY
Status: DISCONTINUED | OUTPATIENT
Start: 2022-10-02 | End: 2022-10-07 | Stop reason: HOSPADM

## 2022-10-02 RX ORDER — SODIUM CHLORIDE 0.9 % (FLUSH) 0.9 %
5-40 SYRINGE (ML) INJECTION EVERY 12 HOURS SCHEDULED
Status: DISCONTINUED | OUTPATIENT
Start: 2022-10-02 | End: 2022-10-07 | Stop reason: HOSPADM

## 2022-10-02 RX ORDER — SUCRALFATE 1 G/1
1 TABLET ORAL ONCE
Status: COMPLETED | OUTPATIENT
Start: 2022-10-02 | End: 2022-10-02

## 2022-10-02 RX ORDER — SODIUM CHLORIDE 0.9 % (FLUSH) 0.9 %
5-40 SYRINGE (ML) INJECTION PRN
Status: DISCONTINUED | OUTPATIENT
Start: 2022-10-02 | End: 2022-10-07 | Stop reason: HOSPADM

## 2022-10-02 RX ORDER — PANTOPRAZOLE SODIUM 40 MG/1
40 TABLET, DELAYED RELEASE ORAL ONCE
Status: COMPLETED | OUTPATIENT
Start: 2022-10-02 | End: 2022-10-02

## 2022-10-02 RX ORDER — PANTOPRAZOLE SODIUM 40 MG/1
40 TABLET, DELAYED RELEASE ORAL
Qty: 60 TABLET | Refills: 1 | Status: SHIPPED | OUTPATIENT
Start: 2022-10-02

## 2022-10-02 RX ORDER — SODIUM CHLORIDE 9 MG/ML
INJECTION, SOLUTION INTRAVENOUS PRN
Status: DISCONTINUED | OUTPATIENT
Start: 2022-10-02 | End: 2022-10-07 | Stop reason: HOSPADM

## 2022-10-02 RX ORDER — ONDANSETRON 2 MG/ML
4 INJECTION INTRAMUSCULAR; INTRAVENOUS EVERY 6 HOURS PRN
Status: DISCONTINUED | OUTPATIENT
Start: 2022-10-02 | End: 2022-10-07 | Stop reason: HOSPADM

## 2022-10-02 RX ORDER — PANTOPRAZOLE SODIUM 40 MG/10ML
40 INJECTION, POWDER, LYOPHILIZED, FOR SOLUTION INTRAVENOUS DAILY
Status: DISCONTINUED | OUTPATIENT
Start: 2022-10-02 | End: 2022-10-03

## 2022-10-02 RX ORDER — SODIUM CHLORIDE 9 MG/ML
INJECTION, SOLUTION INTRAVENOUS CONTINUOUS
Status: DISCONTINUED | OUTPATIENT
Start: 2022-10-02 | End: 2022-10-07 | Stop reason: HOSPADM

## 2022-10-02 RX ORDER — ACETAMINOPHEN 325 MG/1
650 TABLET ORAL EVERY 6 HOURS PRN
Status: DISCONTINUED | OUTPATIENT
Start: 2022-10-02 | End: 2022-10-07 | Stop reason: HOSPADM

## 2022-10-02 RX ORDER — SUCRALFATE 1 G/1
1 TABLET ORAL 4 TIMES DAILY
Qty: 120 TABLET | Refills: 0 | Status: SHIPPED | OUTPATIENT
Start: 2022-10-02

## 2022-10-02 RX ORDER — ONDANSETRON 4 MG/1
4 TABLET, ORALLY DISINTEGRATING ORAL EVERY 8 HOURS PRN
Qty: 12 TABLET | Refills: 0 | Status: ON HOLD | OUTPATIENT
Start: 2022-10-02 | End: 2022-10-02

## 2022-10-02 RX ORDER — POLYETHYLENE GLYCOL 3350 17 G/17G
17 POWDER, FOR SOLUTION ORAL DAILY PRN
Status: DISCONTINUED | OUTPATIENT
Start: 2022-10-02 | End: 2022-10-07 | Stop reason: HOSPADM

## 2022-10-02 RX ORDER — SUCRALFATE 1 G/1
1 TABLET ORAL 4 TIMES DAILY
Status: DISCONTINUED | OUTPATIENT
Start: 2022-10-02 | End: 2022-10-07 | Stop reason: HOSPADM

## 2022-10-02 RX ADMIN — PANTOPRAZOLE SODIUM 40 MG: 40 INJECTION, POWDER, FOR SOLUTION INTRAVENOUS at 20:53

## 2022-10-02 RX ADMIN — SODIUM CHLORIDE 1000 ML: 9 INJECTION, SOLUTION INTRAVENOUS at 08:39

## 2022-10-02 RX ADMIN — SUCRALFATE 1 G: 1 TABLET ORAL at 20:53

## 2022-10-02 RX ADMIN — FENOFIBRATE 54 MG: 54 TABLET ORAL at 20:53

## 2022-10-02 RX ADMIN — PANTOPRAZOLE SODIUM 40 MG: 40 TABLET, DELAYED RELEASE ORAL at 11:10

## 2022-10-02 RX ADMIN — KETOROLAC TROMETHAMINE 15 MG: 30 INJECTION, SOLUTION INTRAMUSCULAR at 18:31

## 2022-10-02 RX ADMIN — SUCRALFATE 1 G: 1 TABLET ORAL at 11:10

## 2022-10-02 RX ADMIN — HYDROMORPHONE HYDROCHLORIDE 1 MG: 1 INJECTION, SOLUTION INTRAMUSCULAR; INTRAVENOUS; SUBCUTANEOUS at 08:42

## 2022-10-02 RX ADMIN — ONDANSETRON HYDROCHLORIDE 4 MG: 2 INJECTION, SOLUTION INTRAMUSCULAR; INTRAVENOUS at 08:41

## 2022-10-02 RX ADMIN — SODIUM CHLORIDE: 9 INJECTION, SOLUTION INTRAVENOUS at 21:03

## 2022-10-02 RX ADMIN — ONDANSETRON HYDROCHLORIDE 4 MG: 2 INJECTION, SOLUTION INTRAMUSCULAR; INTRAVENOUS at 18:31

## 2022-10-02 RX ADMIN — LIDOCAINE HYDROCHLORIDE: 20 SOLUTION ORAL; TOPICAL at 11:10

## 2022-10-02 RX ADMIN — SODIUM CHLORIDE: 9 INJECTION, SOLUTION INTRAVENOUS at 19:35

## 2022-10-02 RX ADMIN — IOPAMIDOL 100 ML: 755 INJECTION, SOLUTION INTRAVENOUS at 10:12

## 2022-10-02 RX ADMIN — HYDROMORPHONE HYDROCHLORIDE 0.5 MG: 1 INJECTION, SOLUTION INTRAMUSCULAR; INTRAVENOUS; SUBCUTANEOUS at 19:20

## 2022-10-02 RX ADMIN — SODIUM CHLORIDE 1000 ML: 9 INJECTION, SOLUTION INTRAVENOUS at 18:30

## 2022-10-02 RX ADMIN — ENOXAPARIN SODIUM 30 MG: 100 INJECTION SUBCUTANEOUS at 20:54

## 2022-10-02 RX ADMIN — HYDROMORPHONE HYDROCHLORIDE 0.5 MG: 1 INJECTION, SOLUTION INTRAMUSCULAR; INTRAVENOUS; SUBCUTANEOUS at 22:06

## 2022-10-02 ASSESSMENT — PAIN DESCRIPTION - LOCATION
LOCATION: ABDOMEN

## 2022-10-02 ASSESSMENT — PAIN - FUNCTIONAL ASSESSMENT
PAIN_FUNCTIONAL_ASSESSMENT: 0-10
PAIN_FUNCTIONAL_ASSESSMENT: 0-10
PAIN_FUNCTIONAL_ASSESSMENT: PREVENTS OR INTERFERES SOME ACTIVE ACTIVITIES AND ADLS

## 2022-10-02 ASSESSMENT — ENCOUNTER SYMPTOMS
VOMITING: 1
ABDOMINAL PAIN: 1
SORE THROAT: 0
BACK PAIN: 0
NAUSEA: 1
EYE PAIN: 0
BLOOD IN STOOL: 0
DIARRHEA: 0
COUGH: 0
RHINORRHEA: 0
SHORTNESS OF BREATH: 0

## 2022-10-02 ASSESSMENT — PAIN DESCRIPTION - ORIENTATION
ORIENTATION: MID;UPPER
ORIENTATION: UPPER
ORIENTATION: MID;UPPER

## 2022-10-02 ASSESSMENT — PAIN DESCRIPTION - DESCRIPTORS
DESCRIPTORS: SHOOTING;NAGGING
DESCRIPTORS: ACHING;SHARP

## 2022-10-02 ASSESSMENT — PAIN DESCRIPTION - PAIN TYPE
TYPE: ACUTE PAIN
TYPE: ACUTE PAIN

## 2022-10-02 ASSESSMENT — PAIN SCALES - GENERAL
PAINLEVEL_OUTOF10: 10
PAINLEVEL_OUTOF10: 4

## 2022-10-02 ASSESSMENT — PAIN DESCRIPTION - FREQUENCY: FREQUENCY: CONTINUOUS

## 2022-10-02 NOTE — DISCHARGE INSTRUCTIONS
For gastritis, take Protonix as well as Carafate. You also take Maalox as needed. Take Zofran as needed for nausea. You have been given a referral for GI. You have also been given a referral for your prostatic stone with urology. You are noted to have a small hernia around your umbilicus, does not appear to have any complications today. Please also follow-up with primary care regarding your symptoms. If you do develop any worsening symptoms such as pain that returns, vomiting, or having black or bloody stools then please return to the emergency department immediately.

## 2022-10-02 NOTE — ED PROVIDER NOTES
[address] [facility]  EMERGENCY DEPARTMENT ENCOUNTER        Pt Name: Wiliam Nichols  MRN: [id number]  [place] 1980  Date of evaluation: 10/2/2022  Provider: AMANDA Torerz CNP  PCP: AMANDA Shrestha CNP  Note Started: 6:28 PM EDT       I have seen and evaluated this patient with my supervising physician Irene Rivera MD.      Triage CHIEF COMPLAINT       Chief Complaint   Patient presents with    Abdominal Pain     Just Discharge for same thing pt states he went home and took a nap woke with increased upper abdominal pain and SOB; states he  did not get medications filled yet. Emesis x 1          HISTORY OF PRESENT ILLNESS   (Location/Symptom, Timing/Onset, Context/Setting, Quality, Duration, Modifying Factors, Severity)  Note limiting factors. Chief Complaint: Nausea and vomiting    Wiliam Nichols is a 43 y.o. male who presents to the emergency department symptoms of nausea and vomiting. Patient reported symptoms of vomiting began approximately last night. He states that he has had multiple episodes of emesis. He states that he was seen here in the emergency department actually had work-up including CT scan of chest and abdomen. Significant laboratory work-up as well. He was feeling better while he was here in the hospital earlier today and upon going home began to have symptoms of nausea and some more vomiting. He states that he attempted to trial his symptoms at home but states he just is feeling worse. Upon arrival in the emergency department he was retching and vomiting. Nursing Notes were all reviewed and agreed with or any disagreements were addressed in the HPI. REVIEW OF SYSTEMS    (2-9 systems for level 4, 10 or more for level 5)     Review of Systems   Constitutional:  Negative for chills, diaphoresis and fever. HENT:  Negative for congestion, ear pain, rhinorrhea and sore throat. Eyes:  Negative for pain and visual disturbance. Respiratory:  Negative for cough and shortness of breath. Cardiovascular:  Negative for chest pain and leg swelling. Gastrointestinal:  Positive for abdominal pain, nausea and vomiting. Negative for blood in stool and diarrhea. Genitourinary:  Negative for difficulty urinating, dysuria, flank pain and frequency. Musculoskeletal:  Negative for back pain and neck pain. Skin:  Negative for rash and wound. Neurological:  Negative for dizziness and light-headedness. PAST MEDICAL HISTORY     Past Medical History:   Diagnosis Date    AA (alcohol abuse)     Depression     Hyperlipidemia        SURGICAL HISTORY     Past Surgical History:   Procedure Laterality Date    HERNIA REPAIR         CURRENTMEDICATIONS       Previous Medications    ALUMINUM & MAGNESIUM HYDROXIDE-SIMETHICONE (MAALOX) 200-200-20 MG/5ML SUSP SUSPENSION    Take 5 mLs by mouth every 6 hours as needed for Indigestion    ASPIRIN 81 MG EC TABLET    Take 81 mg by mouth daily    FENOFIBRATE (TRICOR) 48 MG TABLET    TAKE 1 TABLET BY MOUTH EVERY DAY    ONDANSETRON (ZOFRAN ODT) 4 MG DISINTEGRATING TABLET    Take 1 tablet by mouth every 8 hours as needed for Nausea or Vomiting    PANTOPRAZOLE (PROTONIX) 40 MG TABLET    Take 1 tablet by mouth 2 times daily (before meals)    SUCRALFATE (CARAFATE) 1 GM TABLET    Take 1 tablet by mouth 4 times daily    TRAZODONE (DESYREL) 100 MG TABLET    TAKE 1 TABLET BY MOUTH DAILY AT NIGHT       ALLERGIES     Patient has no known allergies.     FAMILYHISTORY       Family History   Problem Relation Age of Onset    Alcohol Abuse Maternal Grandfather     Heart Attack Paternal Grandfather     Alcohol Abuse Paternal Grandfather         SOCIAL HISTORY       Social History     Socioeconomic History    Marital status: Single     Spouse name: None    Number of children: 3    Years of education: None    Highest education level: None   Tobacco Use    Smoking status: Former     Packs/day: 1.00     Years: 14.00     Pack years: 14.00     Types: Cigarettes     Start date: 1995     Quit date: 2020     Years since quittin.2    Smokeless tobacco: Former     Types: Chew   Vaping Use    Vaping Use: Never used   Substance and Sexual Activity    Alcohol use: Not Currently     Comment: recovering alcoholic [id number]    Drug use: Not Currently     Types: 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    Sexual activity: Yes     Partners: Female     Social Determinants of Health     Financial Resource Strain: Low Risk     Difficulty of Paying Living Expenses: Not hard at all   Food Insecurity: No Food Insecurity    Worried About [address] in the Last Year: Never true    [email or web address] in the Last Year: Never true   Transportation Needs: No Transportation Needs    Lack of Transportation (Medical): No    Lack of Transportation (Non-Medical): No   Housing Stability: Low Risk     Unable to Pay for Housing in the Last Year: No    Number of [place] in the Last Year: 1    Unstable Housing in the Last Year: No       SCREENINGS    Townsend Coma Scale  Eye Opening: Spontaneous  Best Verbal Response: Oriented  Best Motor Response: Obeys commands  Townsend Coma Scale Score: 15        PHYSICAL EXAM    (up to 7 for level 4, 8 or more for level 5)     ED Triage Vitals   BP Temp Temp src Heart Rate Resp SpO2 Height Weight   10/02/22 1742 10/02/22 173 -- 10/02/22 1739 10/02/22 1739 10/02/22 173 -- --   (!) 158/108 98.3 °F (36.8 °C)  72 24 96 %         Physical Exam  Vitals and nursing note reviewed. Constitutional:       Appearance: Normal appearance. He is not toxic-appearing or diaphoretic. HENT:      Head: Normocephalic and atraumatic. Nose: Nose normal.   Eyes:      General:         Right eye: No discharge. Left eye: No discharge. Cardiovascular:      Rate and Rhythm: Normal rate and regular rhythm. Heart sounds: Normal heart sounds. No murmur heard.   Pulmonary:      Effort: Pulmonary effort is normal. No respiratory distress. Abdominal:      General: Abdomen is flat. Bowel sounds are normal.      Palpations: Abdomen is soft. Tenderness: There is abdominal tenderness in the left upper quadrant. There is no guarding. Negative signs include Johnson's sign and McBurney's sign. Hernia: No hernia is present. Musculoskeletal:         General: Normal range of motion. Cervical back: Normal range of motion and neck supple. Skin:     General: Skin is warm and dry. Neurological:      General: No focal deficit present. Mental Status: He is alert and oriented to person, place, and time. Psychiatric:         Mood and Affect: Mood normal.         Behavior: Behavior normal.       DIAGNOSTIC RESULTS   LABS:    Labs Reviewed   CBC WITH AUTO DIFFERENTIAL   COMPREHENSIVE METABOLIC PANEL W/ REFLEX TO MG FOR LOW K   TROPONIN   LIPASE   LACTIC ACID       When ordered, only abnormal lab results are displayed. All other labs were within normal range or not returned as of this dictation. EKG: When ordered, EKG's are interpreted by the Emergency Department Physician in the absence of a cardiologist.  Please see their note for interpretation of EKG. RADIOLOGY:   Non-plain film images such as CT, Ultrasound and MRI are read by the radiologist. Plain radiographic images are visualized andpreliminarily interpreted by the  ED Provider with the below findings:        Interpretation perthe Radiologist below, if available at the time of this note:    No orders to display     CT ABDOMEN PELVIS W IV CONTRAST Additional Contrast? None    Result Date: 10/2/2022  EXAMINATION: CT OF [address]; CTA OF THE CHEST 10/2/2022 9:51 am TECHNIQUE: CT of the abdomen and pelvis was performed with the administration of intravenous contrast. Multiplanar reformatted images are provided for review.  Automated exposure control, iterative reconstruction, and/or weight based adjustment of the mA/kV was utilized to reduce the radiation dose to as low as reasonably achievable.; CTA of the chest was performed after the administration of intravenous contrast.  Multiplanar reformatted images are provided for review. MIP images are provided for review. Automated exposure control, iterative reconstruction, and/or weight based adjustment of the mA/kV was utilized to reduce the radiation dose to as low as reasonably achievable. COMPARISON: Chest CT dated 12/11/2020 HISTORY: ORDERING SYSTEM PROVIDED HISTORY: mid epigastric/luq pain TECHNOLOGIST PROVIDED HISTORY: Additional Contrast?->None Reason for exam:->mid epigastric/luq pain Decision Support Exception - unselect if not a suspected or confirmed emergency medical condition->Emergency Medical Condition (MA) Reason for Exam: ABD PAIN; ORDERING SYSTEM PROVIDED HISTORY: short of breath TECHNOLOGIST PROVIDED HISTORY: Reason for exam:->short of breath Decision Support Exception - unselect if not a suspected or confirmed emergency medical condition->Emergency Medical Condition (MA) Reason for Exam: SOB, ABD PAIN IN UPPER CENTER FINDINGS: Chest: Mediastinum: Within the mediastinum, no gross intraluminal flap. Pulsation artifact is seen at the ascending aorta. Calcified subcarinal lymph node, in keeping with granulomatous disease. Within the mediastinum, no miriam activity markedly greater than mediastinal background. Thyroid is symmetric in size. No axillary adenopathy. Within the main, central most right, central most left pulmonary arteries, no evidence of filling defect to suggest large central pulmonary embolism. Within the opacified branch vessels, no gross embolism is seen. Lungs/pleura: Ground-glass opacity bilaterally, greatest dependently, favoring atelectasis. No pleural effusion or pneumothorax. Soft Tissues/Bones: Degenerative change of spine. Abdomen/Pelvis: Organs: No intrahepatic ductal dilatation. Spleen is within normal limits.  Gallbladder is unremarkable. Stomach is decompressed. No pancreatic ductal dilatation or stranding. 4 mm fat density lesion within the right adrenal gland, likely myelolipoma. Mild bilateral pelviectasis. No ureteral calculus. Shotty right upper quadrant portal and peripancreatic lymph nodes, similar to prior. Abdominal aorta is within normal limits in caliber. GI/Bowel: Diverticulosis, without wanda diverticulitis. Moderate stool within the colon. Appendix is within normal limits in caliber. Loops of small and large bowel are nondilated. Fat containing paraumbilical hernia. Pelvis: Bladder is grossly unremarkable. No inguinal adenopathy. Fat containing inguinal hernias bilaterally. Premalar punctate calcification within the central prostate. Peritoneum/Retroperitoneum: No free air. Bones/Soft Tissues: Irregularity of the lateral right 12th rib. Mild spinal degenerative change. Chest: No findings to suggest pulmonary embolism. Age-indeterminate right 12th rib fracture. Abdomen pelvis: 3 mm calcification within the central prostate which could reflect prostate calcification or small calculus within the prostatic urethra. Mild bilateral pelviectasis. Suggest correlation with urinalysis. Otherwise, no gross acute intra-abdominal process. Fat containing paraumbilical hernia and fat containing inguinal hernias. XR CHEST PORTABLE    Result Date: 10/2/2022  EXAMINATION: ONE XRAY VIEW OF THE CHEST 10/2/2022 8:52 am COMPARISON: 11/19/2020 HISTORY: ORDERING SYSTEM PROVIDED HISTORY: abd pain, short of breath TECHNOLOGIST PROVIDED HISTORY: Reason for exam:->abd pain, short of breath Reason for Exam: abd pain, SOB FINDINGS: Heterogeneous left basilar pulmonary opacity is seen, in configuration slightly different than prior. A 1.5 cm nodular component is seen laterally. No significant pleural effusion. No pneumothorax. Cardiac and mediastinal silhouettes are similar to prior.      Left basilar opacity is seen, for which some considerations include pneumonia, atelectasis, or scar. An underlying pulmonary nodule cannot be excluded. If this does not resolve, chest CT would be suggested for further characterization. CT CHEST PULMONARY EMBOLISM W CONTRAST    Result Date: 10/2/2022  EXAMINATION: CT OF THE ABDOMEN AND PELVIS WITH CONTRAST; CTA OF THE CHEST 10/2/2022 9:51 am TECHNIQUE: CT of the abdomen and pelvis was performed with the administration of intravenous contrast. Multiplanar reformatted images are provided for review. Automated exposure control, iterative reconstruction, and/or weight based adjustment of the mA/kV was utilized to reduce the radiation dose to as low as reasonably achievable.; CTA of the chest was performed after the administration of intravenous contrast.  Multiplanar reformatted images are provided for review. MIP images are provided for review. Automated exposure control, iterative reconstruction, and/or weight based adjustment of the mA/kV was utilized to reduce the radiation dose to as low as reasonably achievable. COMPARISON: Chest CT dated 12/11/2020 HISTORY: ORDERING SYSTEM PROVIDED HISTORY: mid epigastric/luq pain TECHNOLOGIST PROVIDED HISTORY: Additional Contrast?->None Reason for exam:->mid epigastric/luq pain Decision Support Exception - unselect if not a suspected or confirmed emergency medical condition->Emergency Medical Condition (MA) Reason for Exam: ABD PAIN; ORDERING SYSTEM PROVIDED HISTORY: short of breath TECHNOLOGIST PROVIDED HISTORY: Reason for exam:->short of breath Decision Support Exception - unselect if not a suspected or confirmed emergency medical condition->Emergency Medical Condition (MA) Reason for Exam: SOB, ABD PAIN IN UPPER CENTER FINDINGS: Chest: Mediastinum: Within the mediastinum, no gross intraluminal flap. Pulsation artifact is seen at the ascending aorta. Calcified subcarinal lymph node, in keeping with granulomatous disease.   Within the mediastinum, no miriam activity markedly greater than mediastinal background. Thyroid is symmetric in size. No axillary adenopathy. Within the main, central most right, central most left pulmonary arteries, no evidence of filling defect to suggest large central pulmonary embolism. Within the opacified branch vessels, no gross embolism is seen. Lungs/pleura: Ground-glass opacity bilaterally, greatest dependently, favoring atelectasis. No pleural effusion or pneumothorax. Soft Tissues/Bones: Degenerative change of spine. Abdomen/Pelvis: Organs: No intrahepatic ductal dilatation. Spleen is within normal limits. Gallbladder is unremarkable. Stomach is decompressed. No pancreatic ductal dilatation or stranding. 4 mm fat density lesion within the right adrenal gland, likely myelolipoma. Mild bilateral pelviectasis. No ureteral calculus. Shotty right upper quadrant portal and peripancreatic lymph nodes, similar to prior. Abdominal aorta is within normal limits in caliber. GI/Bowel: Diverticulosis, without wanda diverticulitis. Moderate stool within the colon. Appendix is within normal limits in caliber. Loops of small and large bowel are nondilated. Fat containing paraumbilical hernia. Pelvis: Bladder is grossly unremarkable. No inguinal adenopathy. Fat containing inguinal hernias bilaterally. Premalar punctate calcification within the central prostate. Peritoneum/Retroperitoneum: No free air. Bones/Soft Tissues: Irregularity of the lateral right 12th rib. Mild spinal degenerative change. Chest: No findings to suggest pulmonary embolism. Age-indeterminate right 12th rib fracture. Abdomen pelvis: 3 mm calcification within the central prostate which could reflect prostate calcification or small calculus within the prostatic urethra. Mild bilateral pelviectasis. Suggest correlation with urinalysis. Otherwise, no gross acute intra-abdominal process.  Fat containing paraumbilical hernia and fat containing inguinal hernias. PROCEDURES   Unless otherwise noted below, none     Procedures    CRITICAL CARE TIME   N/A    CONSULTS:  IP CONSULT TO HOSPITALIST      EMERGENCY DEPARTMENT COURSE and DIFFERENTIAL DIAGNOSIS/MDM:   Vitals:    Vitals:    10/02/22 1739 10/02/22 1742   BP:  (!) 158/108   Pulse: 72    Resp: 24    Temp: 98.3 °F (36.8 °C)    SpO2: 96%        Patient was given thefollowing medications:  Medications   0.9 % sodium chloride bolus (1,000 mLs IntraVENous New Bag 10/2/22 1830)   0.9 % sodium chloride infusion (has no administration in time range)   ondansetron (ZOFRAN) injection 4 mg (4 mg IntraVENous Given 10/2/22 1831)   ketorolac (TORADOL) injection 15 mg (15 mg IntraVENous Given 10/2/22 1831)         Is this patient to be included in the SEP-1 Core Measure due to severe sepsis or septic shock? No   Exclusion criteria - the patient is NOT to be included for SEP-1 Core Measure due to:  2+ SIRS criteria are not met    Patient is noted to have recurrent episodes of nausea vomiting. Patient found a outpatient treatment. This time again plan on admission. Patient verbalized understanding and agrees. His general work-up initially looked well. His CT scan was evaluated and only really stepped out was in the calcifications within the urethral area and prostate. He has no symptoms of prostatitis. He has no dysuria. No flank pain or back pain. No fever no chills. He does have significant nausea vomiting and shortly before and after my evaluation he was retching and vomiting. His nausea was treated with Zofran. Also provided Toradol for his abdominal pain. We will go ahead and plan on admission. Patient likely has a gastritis. Patient verbalized understanding agrees. I am the Primary Clinician of Record. FINAL IMPRESSION      1.  Intractable nausea and vomiting          DISPOSITION/PLAN   DISPOSITION Admitted 10/02/2022 06:24:25 PM      PATIENT REFERREDTO:  No follow-up provider specified.     DISCHARGE MEDICATIONS:  New Prescriptions    No medications on file       DISCONTINUED MEDICATIONS:  Discontinued Medications    No medications on file              (Please note that portions ofthis note were completed with a voice recognition program.  Efforts were made to edit the dictations but occasionally words are mis-transcribed.)    AMANDA Benton CNP (electronically signed)             AMANDA Benton CNP  10/02/22 1073

## 2022-10-02 NOTE — ED PROVIDER NOTES
[address] ED      CHIEF COMPLAINT  Abdominal Pain (Upper abdominal pain, started last night, thought it was ingestion took GAS-X with no relief ) and Shortness of Breath       HISTORY OF PRESENT ILLNESS  Juan Mane is a 43 y.o. male  who presents to the ED complaining of abdominal pain. The patient states that the pain woke him up in the middle the night. He describes the pain as midepigastric in nature, also in his left upper quadrant and there is radiation to his back. He describes it as a cramping sensation. The pain is constant, no worsening or remitting factors. He is nauseated, no vomiting. He has not eaten due to the pain. He had normal bowel movements yesterday, no report of melena or hematochezia. He states he has had this pain before but not as severe, has taken Gas-X however this did not work for him this time. He denies fevers or chills. He does describe shortness of breath due to the pain but denies any chest pain. No other complaints, modifying factors or associated symptoms. I have reviewed the following from the nursing documentation. Past Medical History:   Diagnosis Date    AA (alcohol abuse)     Depression     Hyperlipidemia      History reviewed. No pertinent surgical history.     Family History   Problem Relation Age of Onset    Alcohol Abuse Maternal Grandfather     Heart Attack Paternal Grandfather     Alcohol Abuse Paternal Grandfather      Social History     Socioeconomic History    Marital status: Single     Spouse name: Not on file    Number of children: 3    Years of education: Not on file    Highest education level: Not on file   Occupational History    Not on file   Tobacco Use    Smoking status: Former     Packs/day: 1.00     Years: 14.00     Pack years: 14.00     Types: Cigarettes     Start date: 1995     Quit date: 2020     Years since quittin.2    Smokeless tobacco: Former     Types: Chew   Vaping Use    Vaping Use: Never used Substance and Sexual Activity    Alcohol use: Not Currently     Comment: recovering alcoholic 44/5929    Drug use: Not Currently     Types: 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    Sexual activity: Yes     Partners: Female   Other Topics Concern    Not on file   Social History Narrative    Not on file     Social Determinants of Health     Financial Resource Strain: Low Risk     Difficulty of Paying Living Expenses: Not hard at all   Food Insecurity: No Food Insecurity    Worried About [address] in the Last Year: Never true    [address] Flu[employer] in the Last Year: Never true   Transportation Needs: No Transportation Needs    Lack of Transportation (Medical): No    Lack of Transportation (Non-Medical): No   Physical Activity: Not on file   Stress: Not on file   Social Connections: Not on file   Intimate Partner Violence: Not on file   Housing Stability: Low Risk     Unable to Pay for Housing in the Last Year: No    Number of Places Lived in the Last Year: 1    Unstable Housing in the Last Year: No     No current facility-administered medications for this encounter. No current outpatient medications on file.      Facility-Administered Medications Ordered in Other Encounters   Medication Dose Route Frequency Provider Last Rate Last Admin    sucralfate (CARAFATE) tablet 1 g  1 g Oral 4x Daily Denis Reilly MD   1 g at 10/02/22 2053    fenofibrate (TRICOR) tablet 54 mg  54 mg Oral Daily Denis Reilly MD   54 mg at 10/02/22 2053    sodium chloride flush 0.9 % injection 5-40 mL  5-40 mL IntraVENous 2 times per day Denis Reilly MD        sodium chloride flush 0.9 % injection 5-40 mL  5-40 mL IntraVENous PRN Denis Reilly MD        0.9 % sodium chloride infusion   IntraVENous PRN Denis Reilly MD        enoxaparin Sodium (LOVENOX) injection 30 mg  30 mg SubCUTAneous BID Denis Reilly MD   30 mg at 10/02/22 2054    ondansetron (ZOFRAN-ODT) disintegrating tablet 4 mg  4 mg Oral Q8H PRN Dominique Fletcher MD        Or    [facility]ron [facility]) injection 4 mg  4 mg IntraVENous Q6H PRN Dominique Fletcher MD        polyethylene glycol (GLYCOLAX) packet 17 g  17 g Oral Daily PRN Dominique Fletcher MD        acetaminophen (TYLENOL) tablet 650 mg  650 mg Oral Q6H PRN Dominique Fletcher MD        Or    acetaminophen (TYLENOL) suppository 650 mg  650 mg Rectal Q6H PRN Dominique Fletcher MD        0.9 % sodium chloride infusion   IntraVENous Continuous Dominique Fletcher  mL/hr at 10/02/22 2103 New Bag at 10/02/22 2103    pantoprazole (PROTONIX) injection 40 mg  40 mg IntraVENous Daily Dominique Fletcher MD   40 mg at 10/02/22 2053    promethazine (PHENERGAN) injection 6.25 mg  6.25 mg IntraMUSCular Q6H PRN Dominique Fletcher MD        ketorolac (TORADOL) injection 15 mg  15 mg IntraVENous Q6H PRN Dominique Fletcher MD        HYDROmorphone (DILAUDID) injection 0.25 mg  0.25 mg IntraVENous Q3H PRN Jason Marquez MD        Or    HYDROmorphone (DILAUDID) injection 0.5 mg  0.5 mg IntraVENous Q3H PRN Jason Marquez MD         No Known Allergies    REVIEW OF SYSTEMS  10 systems reviewed, pertinent positives per HPI otherwise noted to be negative. PHYSICAL EXAM  /77   Pulse 66   Temp 97.7 °F (36.5 °C) (Oral)   Resp 17   Ht 6' (1.829 m)   Wt 230 lb (104.3 kg)   SpO2 98%   BMI 31.19 kg/m²    Physical exam:  General appearance: awake and cooperative. He is in pain distress. Non toxic appearing. Skin: Warm and dry. No rashes or lesions. HENT: Normocephalic. Atraumatic. Mucus membranes are dry   Neck: supple  Eyes: MARI. EOM intact. Heart: RRR. No murmurs. Lungs: Respirations unlabored. CTAB. No wheezes, rales, or rhonchi. Good air exchange  Abdomen: tenderness to palpation diffusely however most notably in LUQ and mid epigastrium; abdomen is distended but soft. Non distended. No rebound or guarding, no peritoneal signs. Bowel sounds normal   Musculoskeletal: No extremity edema. Compartments soft. No deformity. No tenderness in the extremities. All extremities neurovascularly intact. Radial, Dp, and PT pulses +2/4 bilaterally  Neurological: Alert and oriented. No focal deficits. No aphasia or dysarthria. No gait ataxia. Psychiatric: Normal mood and affect. LABS  I have reviewed all labs for this visit.    Results for orders placed or performed during the hospital encounter of 10/02/22   Urinalysis with Reflex to Culture    Specimen: Urine   Result Value Ref Range    Color, UA Yellow Straw/Yellow    Clarity, UA Clear Clear    Glucose, Ur Negative Negative mg/dL    Bilirubin Urine Negative Negative    Ketones, Urine Negative Negative mg/dL    Specific Gravity, UA <=1.005 1.005 - 1.030    Blood, Urine Negative Negative    pH, UA 5.5 5.0 - 8.0    Protein, UA Negative Negative mg/dL    Urobilinogen, Urine 0.2 <2.0 E.U./dL    Nitrite, Urine Negative Negative    Leukocyte Esterase, Urine TRACE (A) Negative    Microscopic Examination YES     Urine Type NotGiven     Urine Reflex to Culture Not Indicated    CBC with Auto Differential   Result Value Ref Range    WBC 10.5 4.0 - 11.0 K/uL    RBC 5.41 4.20 - 5.90 M/uL    Hemoglobin 15.6 13.5 - 17.5 g/dL    Hematocrit 45.6 40.5 - 52.5 %    MCV 84.3 80.0 - 100.0 fL    MCH 28.7 26.0 - 34.0 pg    MCHC 34.1 31.0 - 36.0 g/dL    RDW 14.0 12.4 - 15.4 %    Platelets 458 630 - 254 K/uL    MPV 8.9 5.0 - 10.5 fL    Neutrophils % 68.5 %    Lymphocytes % 20.0 %    Monocytes % 7.7 %    Eosinophils % 2.9 %    Basophils % 0.9 %    Neutrophils Absolute 7.2 1.7 - 7.7 K/uL    Lymphocytes Absolute 2.1 1.0 - 5.1 K/uL    Monocytes Absolute 0.8 0.0 - 1.3 K/uL    Eosinophils Absolute 0.3 0.0 - 0.6 K/uL    Basophils Absolute 0.1 0.0 - 0.2 K/uL   Comprehensive Metabolic Panel w/ Reflex to MG   Result Value Ref Range    Sodium 133 (L) 136 - 145 mmol/L    Potassium reflex Magnesium 4.2 3.5 - 5.1 mmol/L    Chloride 99 99 - 110 mmol/L    CO2 22 21 - 32 mmol/L    Anion Gap 12 3 - 16    Glucose 168 (H) 70 - 99 mg/dL    BUN 13 7 - 20 mg/dL    Creatinine 1.0 0.9 - 1.3 mg/dL    GFR Non-African American >60 >60    GFR African American >60 >60    Calcium 9.0 8.3 - 10.6 mg/dL    Total Protein 7.5 6.4 - 8.2 g/dL    Albumin 4.2 3.4 - 5.0 g/dL    Albumin/Globulin Ratio 1.3 1.1 - 2.2    Total Bilirubin 0.5 0.0 - 1.0 mg/dL    Alkaline Phosphatase 82 40 - 129 U/L    ALT 30 10 - 40 U/L    AST 23 15 - 37 U/L   Lipase   Result Value Ref Range    Lipase 19.0 13.0 - 60.0 U/L   Troponin   Result Value Ref Range    Troponin <0.01 <0.01 ng/mL   Microscopic Urinalysis   Result Value Ref Range    WBC, UA 0-2 0 - 5 /HPF    RBC, UA None seen 0 - 4 /HPF   EKG 12 Lead   Result Value Ref Range    Ventricular Rate 69 BPM    Atrial Rate 69 BPM    P-R Interval 154 ms    QRS Duration 98 ms    Q-T Interval 392 ms    QTc Calculation (Bazett) 420 ms    P Axis 35 degrees    R Axis 67 degrees    T Axis 50 degrees    Diagnosis       Normal sinus rhythmNormal ECGNo previous ECGs availableConfirmed by COTY Enriquez MD ([id number]) on 10/2/2022 3:07:34 PM       ECG  The Ekg interpreted by me shows  normal sinus rhythm with a rate of 69  Axis is   Normal  QTc is  within an acceptable range  Intervals and Durations are unremarkable. ST Segments: normal      RADIOLOGY    No results found. Is this patient to be included in the SEP-1 Core Measure due to severe sepsis or septic shock? No   Exclusion criteria - the patient is NOT to be included for SEP-1 Core Measure due to: Infection is not suspected        ED COURSE/MDM  Patient seen and evaluated. Old records reviewed. Labs and imaging reviewed and results discussed with patient. The patient is a 51-year-old male who presents to the ED for evaluation of abdominal pain. Vital signs are within normal limits. He is nontoxic-appearing but is in pain distress.   His abdomen is tender, but nonperitoneal.  He is given pain control here. Work-up was obtained. His blood work is unremarkable. He does not have a leukocytosis, anemia, or POLINA. He does not have a transaminitis. Lipase is normal.  He states he is short of breath, chest x-ray with had an unusual appearance in the left hemithorax so I did a CTPA. This was negative for acute process, no pneumonia and no PE. Otherwise CT abdomen pelvis was also negative for an acute intra-abdominal process that would explain his symptoms. Overall his work-up is unremarkable, and I do suspect he has a gastritis based on the location of his pain. He denies alcohol use or frequent NSAID use. He is given a Protonix Carafate and a GI cocktail here additionally. On reevaluation he is feeling significantly improved. Pain is much more tolerable. He is informed of his incidental findings on CT including his prostatic stone. He is given urology follow-up for this. He is given prescriptions for Protonix, Carafate Maalox and Zofran. He is also given GI referral.  We discussed strict return precautions back to the ED and he voices understanding. During the patient's ED course, the patient was given:  Medications   HYDROmorphone (DILAUDID) injection 1 mg (1 mg IntraVENous Given 10/2/22 0842)   0.9 % sodium chloride bolus (0 mLs IntraVENous Stopped 10/2/22 1236)   ondansetron (ZOFRAN) injection 4 mg (4 mg IntraVENous Given 10/2/22 0841)   iopamidol (ISOVUE-370) 76 % injection 100 mL (100 mLs IntraVENous Given 10/2/22 1012)   aluminum & magnesium hydroxide-simethicone (MAALOX) 30 mL, lidocaine viscous hcl (XYLOCAINE) 5 mL (GI COCKTAIL) ( Oral Given 10/2/22 1110)   pantoprazole (PROTONIX) tablet 40 mg (40 mg Oral Given 10/2/22 1110)   sucralfate (CARAFATE) tablet 1 g (1 g Oral Given 10/2/22 1110)        CLINICAL IMPRESSION  1. Acute gastritis without hemorrhage, unspecified gastritis type    2. Prostatic stone    3.  Periumbilical hernia        Blood pressure 135/77, pulse 66, temperature 97.7 °F (36.5 °C), temperature source Oral, resp. rate 17, height 6' (1.829 m), weight 230 lb (104.3 kg), SpO2 98 %. Patient was given scripts for the following medications. I counseled patient how to take these medications. Discharge Medication List as of 10/2/2022 12:23 PM        START taking these medications    Details   pantoprazole (PROTONIX) 40 MG tablet Take 1 tablet by mouth 2 times daily (before meals), Disp-60 tablet, R-1Normal      sucralfate (CARAFATE) 1 GM tablet Take 1 tablet by mouth 4 times daily, Disp-120 tablet, R-0Normal      aluminum & magnesium hydroxide-simethicone (MAALOX) 200-200-20 MG/5ML SUSP suspension Take 5 mLs by mouth every 6 hours as needed for Indigestion, Disp-1 each, R-0Normal      ondansetron (ZOFRAN ODT) 4 MG disintegrating tablet Take 1 tablet by mouth every 8 hours as needed for Nausea or Vomiting, Disp-12 tablet, R-0Normal             Follow-up with:  Madison Edge MD  [address], [address] [facility] [address]  [facility] [phone]    Call in 1 day      AMANDA Benjamin - CNP  [address] [facility] [address] [phone]    Call in 1 day      Annamarie Ramirez MD  [address] [place] [address] [facility] [address]  [phone]    Call in 1 day      DISCLAIMER: This chart was created using Dragon dictation software. Efforts were made by me to ensure accuracy, however some errors may be present due to limitations of this technology and occasionally words are not transcribed correctly.        [place], [address] [place] [address]  10/02/22 7600

## 2022-10-03 ENCOUNTER — ANESTHESIA (OUTPATIENT)
Dept: ENDOSCOPY | Age: 42
DRG: 419 | End: 2022-10-03
Payer: COMMERCIAL

## 2022-10-03 ENCOUNTER — ANESTHESIA EVENT (OUTPATIENT)
Dept: ENDOSCOPY | Age: 42
DRG: 419 | End: 2022-10-03
Payer: COMMERCIAL

## 2022-10-03 LAB
ANION GAP SERPL CALCULATED.3IONS-SCNC: 9 MMOL/L (ref 3–16)
BUN BLDV-MCNC: 8 MG/DL (ref 7–20)
CALCIUM SERPL-MCNC: 8.7 MG/DL (ref 8.3–10.6)
CHLORIDE BLD-SCNC: 100 MMOL/L (ref 99–110)
CO2: 26 MMOL/L (ref 21–32)
CREAT SERPL-MCNC: 0.9 MG/DL (ref 0.9–1.3)
GFR AFRICAN AMERICAN: >60
GFR NON-AFRICAN AMERICAN: >60
GLUCOSE BLD-MCNC: 106 MG/DL (ref 70–99)
GLUCOSE BLD-MCNC: 125 MG/DL (ref 70–99)
GLUCOSE BLD-MCNC: 158 MG/DL (ref 70–99)
PERFORMED ON: ABNORMAL
PERFORMED ON: ABNORMAL
POTASSIUM REFLEX MAGNESIUM: 4.2 MMOL/L (ref 3.5–5.1)
SODIUM BLD-SCNC: 135 MMOL/L (ref 136–145)

## 2022-10-03 PROCEDURE — 0DJ08ZZ INSPECTION OF UPPER INTESTINAL TRACT, VIA NATURAL OR ARTIFICIAL OPENING ENDOSCOPIC: ICD-10-PCS | Performed by: INTERNAL MEDICINE

## 2022-10-03 PROCEDURE — 7100000010 HC PHASE II RECOVERY - FIRST 15 MIN: Performed by: INTERNAL MEDICINE

## 2022-10-03 PROCEDURE — 99223 1ST HOSP IP/OBS HIGH 75: CPT | Performed by: INTERNAL MEDICINE

## 2022-10-03 PROCEDURE — 80048 BASIC METABOLIC PNL TOTAL CA: CPT

## 2022-10-03 PROCEDURE — 6360000002 HC RX W HCPCS: Performed by: INTERNAL MEDICINE

## 2022-10-03 PROCEDURE — 6360000002 HC RX W HCPCS: Performed by: NURSE ANESTHETIST, CERTIFIED REGISTERED

## 2022-10-03 PROCEDURE — 3700000000 HC ANESTHESIA ATTENDED CARE: Performed by: INTERNAL MEDICINE

## 2022-10-03 PROCEDURE — 96372 THER/PROPH/DIAG INJ SC/IM: CPT

## 2022-10-03 PROCEDURE — 2500000003 HC RX 250 WO HCPCS: Performed by: NURSE ANESTHETIST, CERTIFIED REGISTERED

## 2022-10-03 PROCEDURE — 1200000000 HC SEMI PRIVATE

## 2022-10-03 PROCEDURE — 3609017100 HC EGD: Performed by: INTERNAL MEDICINE

## 2022-10-03 PROCEDURE — 7100000011 HC PHASE II RECOVERY - ADDTL 15 MIN: Performed by: INTERNAL MEDICINE

## 2022-10-03 PROCEDURE — 2580000003 HC RX 258: Performed by: INTERNAL MEDICINE

## 2022-10-03 PROCEDURE — C9113 INJ PANTOPRAZOLE SODIUM, VIA: HCPCS | Performed by: INTERNAL MEDICINE

## 2022-10-03 PROCEDURE — 2709999900 HC NON-CHARGEABLE SUPPLY: Performed by: INTERNAL MEDICINE

## 2022-10-03 PROCEDURE — 3700000001 HC ADD 15 MINUTES (ANESTHESIA): Performed by: INTERNAL MEDICINE

## 2022-10-03 PROCEDURE — G0378 HOSPITAL OBSERVATION PER HR: HCPCS

## 2022-10-03 PROCEDURE — 6370000000 HC RX 637 (ALT 250 FOR IP): Performed by: INTERNAL MEDICINE

## 2022-10-03 PROCEDURE — 36415 COLL VENOUS BLD VENIPUNCTURE: CPT

## 2022-10-03 PROCEDURE — 96376 TX/PRO/DX INJ SAME DRUG ADON: CPT

## 2022-10-03 PROCEDURE — 2580000003 HC RX 258: Performed by: NURSE ANESTHETIST, CERTIFIED REGISTERED

## 2022-10-03 RX ORDER — SODIUM CHLORIDE, SODIUM LACTATE, POTASSIUM CHLORIDE, CALCIUM CHLORIDE 600; 310; 30; 20 MG/100ML; MG/100ML; MG/100ML; MG/100ML
INJECTION, SOLUTION INTRAVENOUS CONTINUOUS PRN
Status: DISCONTINUED | OUTPATIENT
Start: 2022-10-03 | End: 2022-10-03 | Stop reason: SDUPTHER

## 2022-10-03 RX ORDER — GLYCOPYRROLATE 0.2 MG/ML
INJECTION INTRAMUSCULAR; INTRAVENOUS PRN
Status: DISCONTINUED | OUTPATIENT
Start: 2022-10-03 | End: 2022-10-03 | Stop reason: SDUPTHER

## 2022-10-03 RX ORDER — PROPOFOL 10 MG/ML
INJECTION, EMULSION INTRAVENOUS PRN
Status: DISCONTINUED | OUTPATIENT
Start: 2022-10-03 | End: 2022-10-03 | Stop reason: SDUPTHER

## 2022-10-03 RX ORDER — PANTOPRAZOLE SODIUM 40 MG/10ML
40 INJECTION, POWDER, LYOPHILIZED, FOR SOLUTION INTRAVENOUS 2 TIMES DAILY
Status: DISCONTINUED | OUTPATIENT
Start: 2022-10-03 | End: 2022-10-07 | Stop reason: HOSPADM

## 2022-10-03 RX ORDER — LIDOCAINE HYDROCHLORIDE 20 MG/ML
INJECTION, SOLUTION INFILTRATION; PERINEURAL PRN
Status: DISCONTINUED | OUTPATIENT
Start: 2022-10-03 | End: 2022-10-03 | Stop reason: SDUPTHER

## 2022-10-03 RX ORDER — ONDANSETRON 2 MG/ML
INJECTION INTRAMUSCULAR; INTRAVENOUS PRN
Status: DISCONTINUED | OUTPATIENT
Start: 2022-10-03 | End: 2022-10-03 | Stop reason: SDUPTHER

## 2022-10-03 RX ADMIN — KETOROLAC TROMETHAMINE 15 MG: 30 INJECTION, SOLUTION INTRAMUSCULAR; INTRAVENOUS at 22:21

## 2022-10-03 RX ADMIN — SUCRALFATE 1 G: 1 TABLET ORAL at 14:41

## 2022-10-03 RX ADMIN — KETOROLAC TROMETHAMINE 15 MG: 30 INJECTION, SOLUTION INTRAMUSCULAR; INTRAVENOUS at 09:54

## 2022-10-03 RX ADMIN — HYDROMORPHONE HYDROCHLORIDE 0.5 MG: 1 INJECTION, SOLUTION INTRAMUSCULAR; INTRAVENOUS; SUBCUTANEOUS at 02:41

## 2022-10-03 RX ADMIN — ACETAMINOPHEN 650 MG: 325 TABLET ORAL at 20:31

## 2022-10-03 RX ADMIN — SUCRALFATE 1 G: 1 TABLET ORAL at 20:31

## 2022-10-03 RX ADMIN — KETOROLAC TROMETHAMINE 15 MG: 30 INJECTION, SOLUTION INTRAMUSCULAR; INTRAVENOUS at 00:19

## 2022-10-03 RX ADMIN — LIDOCAINE HYDROCHLORIDE 80 MG: 20 INJECTION, SOLUTION INFILTRATION; PERINEURAL at 11:44

## 2022-10-03 RX ADMIN — KETOROLAC TROMETHAMINE 15 MG: 30 INJECTION, SOLUTION INTRAMUSCULAR; INTRAVENOUS at 15:53

## 2022-10-03 RX ADMIN — Medication 10 ML: at 20:31

## 2022-10-03 RX ADMIN — FENOFIBRATE 54 MG: 54 TABLET ORAL at 09:54

## 2022-10-03 RX ADMIN — GLYCOPYRROLATE 0.1 MG: 0.2 INJECTION, SOLUTION INTRAMUSCULAR; INTRAVENOUS at 11:44

## 2022-10-03 RX ADMIN — SODIUM CHLORIDE, POTASSIUM CHLORIDE, SODIUM LACTATE AND CALCIUM CHLORIDE: 600; 310; 30; 20 INJECTION, SOLUTION INTRAVENOUS at 11:44

## 2022-10-03 RX ADMIN — SODIUM CHLORIDE: 9 INJECTION, SOLUTION INTRAVENOUS at 15:57

## 2022-10-03 RX ADMIN — PROPOFOL 260 MG: 10 INJECTION, EMULSION INTRAVENOUS at 11:44

## 2022-10-03 RX ADMIN — ENOXAPARIN SODIUM 30 MG: 100 INJECTION SUBCUTANEOUS at 09:54

## 2022-10-03 RX ADMIN — ONDANSETRON HYDROCHLORIDE 4 MG: 2 INJECTION, SOLUTION INTRAMUSCULAR; INTRAVENOUS at 11:42

## 2022-10-03 RX ADMIN — SUCRALFATE 1 G: 1 TABLET ORAL at 18:45

## 2022-10-03 RX ADMIN — SUCRALFATE 1 G: 1 TABLET ORAL at 09:54

## 2022-10-03 RX ADMIN — PANTOPRAZOLE SODIUM 40 MG: 40 INJECTION, POWDER, FOR SOLUTION INTRAVENOUS at 20:31

## 2022-10-03 RX ADMIN — PANTOPRAZOLE SODIUM 40 MG: 40 INJECTION, POWDER, FOR SOLUTION INTRAVENOUS at 09:54

## 2022-10-03 RX ADMIN — ENOXAPARIN SODIUM 30 MG: 100 INJECTION SUBCUTANEOUS at 20:31

## 2022-10-03 RX ADMIN — HYDROMORPHONE HYDROCHLORIDE 0.5 MG: 1 INJECTION, SOLUTION INTRAMUSCULAR; INTRAVENOUS; SUBCUTANEOUS at 07:29

## 2022-10-03 ASSESSMENT — PAIN SCALES - GENERAL
PAINLEVEL_OUTOF10: 6
PAINLEVEL_OUTOF10: 10
PAINLEVEL_OUTOF10: 7
PAINLEVEL_OUTOF10: 9

## 2022-10-03 ASSESSMENT — PAIN DESCRIPTION - LOCATION
LOCATION: ABDOMEN

## 2022-10-03 ASSESSMENT — PAIN DESCRIPTION - DESCRIPTORS: DESCRIPTORS: STABBING

## 2022-10-03 ASSESSMENT — PAIN DESCRIPTION - ORIENTATION: ORIENTATION: RIGHT;UPPER

## 2022-10-03 NOTE — FLOWSHEET NOTE
10/02/22 2050   Vital Signs   Temp 98.1 °F (36.7 °C)   Temp Source Oral   Heart Rate 80   Heart Rate Source Monitor   Resp 16   BP (!) 155/93   BP Location Left upper arm   BP Method Automatic   MAP (Calculated) 113.67   Patient Position Semi fowlers   Level of Consciousness 0   MEWS Score 1   Pain Assessment   Pain Assessment 0-10   Pain Level 10   Pain Location Abdomen   Pain Orientation Mid;Upper   Pain Descriptors Burning;Aching;Tender; Throbbing   Pain Type Acute pain   Oxygen Therapy   SpO2 97 %   O2 Device None (Room air)   Height and Weight   Height 6' (1.829 m)   Weight 238 lb 1.6 oz (108 kg)   BSA (Calculated - sq m) 2.34 sq meters   BMI (Calculated) 32.4   Admission questions completed. Pt c/o nausea and abd pain 10/10. Pt recently given toradol and dilaudid in ER. No other pain meds ordered. Pt aware. Perfectserve sent to nocturnist regarding home meds are now verified and pt is still having pain. Geoff Harris RN    Patient is able to demonstrate the ability to move from a reclining position to an upright position within the recliner. Bedside Mobility Assessment Tool (BMAT):     Assessment Level 1- Sit and Shake    1. From a semi-reclined position, ask patient to sit up and rotate to a seated position at the side of the bed. Can use the bedrail. 2. Ask patient to reach out and grab your hand and shake making sure patient reaches across his/her midline. Pass- Patient is able to come to a seated position, maintain core strength. Maintains seated balance while reaching across midline. Move on to Assessment Level 2. Assessment Level 2- Stretch and Point   1. With patient in seated position at the side of the bed, have patient place both feet on the floor (or stool) with knees no higher than hips. 2. Ask patient to stretch one leg and straighten the knee, then bend the ankle/flex and point the toes. If appropriate, repeat with the other leg.    Pass- Patient is able to demonstrate appropriate quad strength on intended weight bearing limb(s). Move onto Assessment Level 3. Assessment Level 3- Stand   1. Ask patient to elevate off the bed or chair (seated to standing) using an assistive device (cane, bedrail). 2. Patient should be able to raise buttocks off be and hold for a count of five. May repeat once. Pass- Patient maintains standing stability for at least 5 seconds, proceed to assessment level 4. Assessment Level 4- Walk   1. Ask patient to march in place at bedside. 2. Then ask patient to advance step and return each foot. Some medical conditions may render a patient from stepping backwards, use your best clinical judgement. Pass- Patient demonstrates balance while shifting weight and ability to step, takes independent steps, does not use assistive device patient is MOBILITY LEVEL 4.       Mobility Level- 4

## 2022-10-03 NOTE — PROGRESS NOTES
4 Eyes Skin Assessment     The patient is being assess for   Admission    I agree that 2 RN's have performed a thorough Head to Toe Skin Assessment on the patient. ALL assessment sites listed below have been assessed. Areas assessed for pressure by both nurses:   [x]   Head, Face, and Ears   [x]   Shoulders, Back, and Chest, Abdomen  [x]   Arms, Elbows, and Hands   [x]   Coccyx, Sacrum, and Ischium  [x]   Legs, Feet, and Heels   NO OPEN AREAS     Skin Assessed Under all Medical Devices by both nurses:  N/A               All Mepilex Borders were peeled back and area peeked at by both nurses:  No: N/A  Please list where Mepilex Borders are located:               **SHARE this note so that the co-signing nurse is able to place an eSignature**    Co-signer eSignature: Electronically signed by Jj Faust RN on 10/2/22 at 10:52 PM EDT    Does the Patient have Skin Breakdown related to pressure?   No              Jose Luis Prevention initiated:  No   Wound Care Orders initiated:  No      C nurse consulted for Pressure Injury (Stage 3,4, Unstageable, DTI, NWPT, Complex wounds)and New or Established Ostomies:  No      Primary Nurse eSignature: Electronically signed by Annabella Martinez RN on 10/2/22 at 10:27 PM EDT

## 2022-10-03 NOTE — PROGRESS NOTES
HIDA scan to be completed 10/4 around 1130a. Please hold all narcotics after midnight and feed patient a smally fatty meal at midnight (cheese, pudding, peanut butter, etc.). Floor RN notified 10/3 @ [phone]. Please call Nuclear Medicine with any questions @ [phone]. Thanks!

## 2022-10-03 NOTE — H&P
Hospital Medicine History & Physical      PCP: AMANDA Benjamin CNP    Date of Admission: 10/2/2022    Date of Service: Pt seen/examined on 10/3/2022 and Admitted to Inpatient    Chief Complaint:  abd pain. History Of Present Illness: The patient is a 43 y.o. male presents to ED with acute onset severe epigastric and RUQ abd pain acute onset last night. No clear trigger, no unusual changes in diet. No known sick contacts. Reports severe stabbing pain. Associated with some nausea and emesis. No diarrhea. Past Medical History:        Diagnosis Date    AA (alcohol abuse)     Depression     Hyperlipidemia        Past Surgical History:    History reviewed. No pertinent surgical history. Medications Prior to Admission:    Prior to Admission medications    Medication Sig Start Date End Date Taking? Authorizing Provider   pantoprazole (PROTONIX) 40 MG tablet Take 1 tablet by mouth 2 times daily (before meals) 10/2/22   Trini Rios, DO   sucralfate (CARAFATE) 1 GM tablet Take 1 tablet by mouth 4 times daily 10/2/22   [facility], DO   aluminum & magnesium hydroxide-simethicone (MAALOX) 626-697-64 MG/5ML SUSP suspension Take 5 mLs by mouth every 6 hours as needed for Indigestion 10/2/22   Trini Rios, DO   fenofibrate (TRICOR) 48 MG tablet TAKE 1 TABLET BY MOUTH EVERY DAY 8/26/22   AMANDA Benjamin CNP   traZODone (DESYREL) 100 MG tablet TAKE 1 TABLET BY MOUTH DAILY AT NIGHT 5/16/22   Lennox Boston, APRN - CNP   aspirin 81 MG EC tablet Take 81 mg by mouth daily    Historical Provider, MD       Allergies:  Patient has no known allergies. Social History:  The patient currently lives at home. TOBACCO:   reports that he quit smoking about 2 years ago. His smoking use included cigarettes. He started smoking about 27 years ago.  He has a 14.00 pack-year smoking history. He has quit using smokeless tobacco.  His smokeless tobacco use included chew. ETOH:   reports that he does not currently use alcohol. Family History:  Reviewed in detail and negative for DM, Early CAD, Cancer, CVA. Positive as follows:        Problem Relation Age of Onset    Alcohol Abuse Maternal Grandfather     Heart Attack Paternal Grandfather     Alcohol Abuse Paternal Grandfather        REVIEW OF SYSTEMS:   As noted in the HPI. All other systems reviewed and negative. PHYSICAL EXAM:    /68   Pulse 88   Temp 98.3 °F (36.8 °C) (Oral)   Resp 16   Ht 6' (1.829 m)   Wt 238 lb 1.6 oz (108 kg)   SpO2 98%   BMI 32.29 kg/m²     General appearance: No apparent distress appears stated age and cooperative. HEENT Normal cephalic, atraumatic without obvious deformity. Pupils equal, round, and reactive to light. Extra ocular muscles intact. Conjunctivae/corneas clear. Neck: Supple, No jugular venous distention/bruits. Trachea midline without thyromegaly or adenopathy with full range of motion. Lungs: Clear to auscultation, bilaterally without Rales/Wheezes/Rhonchi with good respiratory effort. Heart: Regular rate and rhythm with Normal S1/S2 without murmurs, rubs or gallops, point of maximum impulse non-displaced  Abdomen: Soft, non-tender or non-distended without rigidity or guarding and positive bowel sounds all four quadrants. Extremities: No clubbing, cyanosis, or edema bilaterally. Full range of motion without deformity and normal gait intact. Skin: Skin color, texture, turgor normal.  No rashes or lesions. Neurologic: Alert and oriented X 3, neurovascularly intact with sensory/motor intact upper extremities/lower extremities, bilaterally. Cranial nerves: II-XII intact, grossly non-focal.  Mental status: Alert, oriented, thought content appropriate.   Capillary Refill: Acceptable  < 3 seconds  Peripheral Pulses: +3 Easily felt, not easily obliterated with pressure      CBC   Recent Labs     10/02/22  0827 10/02/22  1823   WBC 10.5 13.5*   HGB 15.6 15.6   HCT 45.6 45.3    227      RENAL  Recent Labs     10/02/22  0827 10/02/22  1823 10/03/22  0553   * 136 135*   K 4.2 4.2 4.2   CL 99 98* 100   CO2 22 28 26   BUN 13 11 8   CREATININE 1.0 1.1 0.9     LFT'S  Recent Labs     10/02/22  0827 10/02/22  1823   AST 23 26   ALT 30 34   BILITOT 0.5 0.4   ALKPHOS 82 89     COAG  No results for input(s): INR in the last 72 hours. CARDIAC ENZYMES  Recent Labs     10/02/22  0827 10/02/22  1823   TROPONINI <0.01 <0.01       U/A:    Lab Results   Component Value Date/Time    COLORU Yellow 10/02/2022 08:27 AM    WBCUA 0-2 10/02/2022 08:27 AM    RBCUA None seen 10/02/2022 08:27 AM    CLARITYU Clear 10/02/2022 08:27 AM    SPECGRAV <=1.005 10/02/2022 08:27 AM    LEUKOCYTESUR TRACE 10/02/2022 08:27 AM    BLOODU Negative 10/02/2022 08:27 AM    GLUCOSEU Negative 10/02/2022 08:27 AM       ABG  No results found for: WUE8APD, BEART, F1ACZORV, PHART, THGBART, AHF1SNG, PO2ART, UPQ0ZTR        Active Hospital Problems    Diagnosis Date Noted    Nausea & vomiting [R11.2] 10/02/2022     Priority: Medium         PHYSICIANS CERTIFICATION:    I certify that Royal Jefferson is expected to be hospitalized for less than 2 midnights based on the following assessment and plan:      ASSESSMENT/PLAN:      Abd Pain. PUD vs Biliary Colic. Persisting pain and tender on exam  Work up has been reassuring todate - normal blood wokr and imaging. GI involved. - EGD today - unremarkable   - plan for HIDA scan. - trial of liquid diet as tolerates. - IVF support. - ok for PRN toradol   - stop IV narcotics. - cont PPI. DVT Prophylaxis: lovenox  Diet: ADULT DIET; Clear Liquid  Code Status: Full Code      Dispo - Med surg. Katarina Griffin MD    Thank you AMANDA Qiu CNP for the opportunity to be involved in this patient's care.  If you have any questions or concerns please feel free to contact me at [phone].

## 2022-10-03 NOTE — PROGRESS NOTES
Called to give report to primary RN. RN unavailable.  Report given to Aries Garcia RN [address] [facility]

## 2022-10-03 NOTE — CONSULTS
Consultation Note    Patient Name: Dian Rascon  : 1980  Age: 43 y.o. Admitting Physician: Neema Barbour MD   Date of Admission: 10/2/2022  5:32 PM   Primary Care Physician: AMANDA Galo - REBECCA        Dian Rascon is being seen at the request of Neema Barbour MD for intractable N/V. History of Present Illness:  43year old male with history of alcohol abuse, hyperlipidemia, depression,  Presents with N/V. He woke with pain 2 days ago across the upper abdomen which he describes as being sharp basically a 10 out of 10 its been constant unrelenting. Since has been in the hospital is now down to about 8 out of 10. Denies any blood in the stool black or tarry stool no bloody emesis. Patient states his bowels are regular there is no diarrhea or constipation. Nothing seems to make the pain worse or better. He has had problems with gas in the past and tried Gas-X with no relief. CT/A/P: fat containing paraumbilical hernia andf at containing inguinal hernia. LFTs wnl, lipase wnl. Mild leukocytosis at 13.5k  COVID and Flu not detected      GI History:  's had some gas never had an upper or lower endoscopy. Past Medical History:  Past Medical History:   Diagnosis Date    AA (alcohol abuse)     Depression     Hyperlipidemia         Past Surgical History:  History reviewed. No pertinent surgical history. Historical Medications:  Prior to Visit Medications    Medication Sig Taking?  Authorizing Provider   pantoprazole (PROTONIX) 40 MG tablet Take 1 tablet by mouth 2 times daily (before meals)  Trini Rios, DO   sucralfate (CARAFATE) 1 GM tablet Take 1 tablet by mouth 4 times daily  Trini Rios, DO   aluminum & magnesium hydroxide-simethicone (MAALOX) 200-200-20 MG/5ML SUSP suspension Take 5 mLs by mouth every 6 hours as needed for Indigestion  Trini Blanca DO   fenofibrate (TRICOR) 48 MG tablet TAKE 1 TABLET BY MOUTH EVERY DAY  Hermanmoraima AMANDA Johansen - CNP   traZODone (DESYREL) 100 MG tablet TAKE 1 TABLET BY MOUTH DAILY AT NIGHT  Yolanda Muñoz APRN - CNP   aspirin 81 MG EC tablet Take 81 mg by mouth daily  Historical Provider, MD        Hospital Medications:  Current Facility-Administered Medications: pantoprazole (PROTONIX) injection 40 mg, 40 mg, IntraVENous, BID  sucralfate (CARAFATE) tablet 1 g, 1 g, Oral, 4x Daily  fenofibrate (TRICOR) tablet 54 mg, 54 mg, Oral, Daily  sodium chloride flush 0.9 % injection 5-40 mL, 5-40 mL, IntraVENous, 2 times per day  sodium chloride flush 0.9 % injection 5-40 mL, 5-40 mL, IntraVENous, PRN  0.9 % sodium chloride infusion, , IntraVENous, PRN  enoxaparin Sodium (LOVENOX) injection 30 mg, 30 mg, SubCUTAneous, BID  ondansetron (ZOFRAN-ODT) disintegrating tablet 4 mg, 4 mg, Oral, Q8H PRN **OR** ondansetron (ZOFRAN) injection 4 mg, 4 mg, IntraVENous, Q6H PRN  polyethylene glycol (GLYCOLAX) packet 17 g, 17 g, Oral, Daily PRN  acetaminophen (TYLENOL) tablet 650 mg, 650 mg, Oral, Q6H PRN **OR** acetaminophen (TYLENOL) suppository 650 mg, 650 mg, Rectal, Q6H PRN  0.9 % sodium chloride infusion, , IntraVENous, Continuous  promethazine (PHENERGAN) injection 6.25 mg, 6.25 mg, IntraMUSCular, Q6H PRN  ketorolac (TORADOL) injection 15 mg, 15 mg, IntraVENous, Q6H PRN  HYDROmorphone (DILAUDID) injection 0.25 mg, 0.25 mg, IntraVENous, Q3H PRN **OR** HYDROmorphone (DILAUDID) injection 0.5 mg, 0.5 mg, IntraVENous, Q3H PRN     Social History:   Social History       Tobacco History       Smoking Status  Former Smoking Start Date  6/22/1995 Quit Date  7/1/2020 Smoking Frequency  1 pack/day for 14.00 years (14.00 pk-yrs)    Smoking Tobacco Type  Cigarettes from 6/22/1995 to 7/1/2020      Smokeless Tobacco Use  Former Smokeless Tobacco Type  Chew              Alcohol History       Alcohol Use Status  Not Currently Comment  recovering alcoholic 25/6428              Drug Use       Drug Use Status  Not Currently Types  Methamphetamines (Crystal Meth) Comment  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              Sexual Activity       Sexually Active  Yes Partners  Female                     Family History:  Family History   Problem Relation Age of Onset    Alcohol Abuse Maternal Grandfather     Heart Attack Paternal Grandfather     Alcohol Abuse Paternal Grandfather         Allergies:  No Known Allergies     ROS:   General: No fever or weight change  Hematologic: No unexpected submucosal bleeding or bruising  HEENT: No sore throat or facial pain  Respiratory: No cough or dyspnea  Cardiovascular: No angina or dependent edema  Gastrointestinal: See HPI  Musculoskeletal: No usual joint pain or stiffness  Skin: No skin eruptions or changing lesions  Neurologic: No focal weakness or numbness  Psychiatric: No anxiety or sleep disturbance    Physical Exam:  Vital Signs:   Vitals:    10/03/22 1300   BP: 118/68   Pulse: 88   Resp: 16   Temp: 98.3 °F (36.8 °C)   SpO2: 98%       General: Well-nourished, well-developed  HEENT: Sclera anicteric, mucosal membranes moist  Cardiovascular: Regular rate and rhythm. No murmurs. Respiratory: Respirations nonlabored, no crepitus  GI: Abdomen nondistended, soft, and nontender. Normal active bowel sounds. No masses palpable. Tender across the abdomen through its entirety more so in the upper abdomen. There is no rebound rigidity or masses  Rectal: Deferred  Musculoskeletal: No pitting edema of the lower legs. Neurological: Gross memory appears intact. Patient is alert and oriented      Recent Imaging:   CT CHEST PULMONARY EMBOLISM W CONTRAST  Narrative: EXAMINATION:  CT OF THE ABDOMEN AND PELVIS WITH CONTRAST; CTA OF THE CHEST 10/2/2022 9:51 am    TECHNIQUE:  CT of the abdomen and pelvis was performed with the administration of  intravenous contrast. Multiplanar reformatted images are provided for review.   Automated exposure control, iterative reconstruction, and/or weight based  adjustment of the mA/kV was utilized to reduce the radiation dose to as low  as reasonably achievable.; CTA of the chest was performed after the  administration of intravenous contrast.  Multiplanar reformatted images are  provided for review. MIP images are provided for review. Automated exposure  control, iterative reconstruction, and/or weight based adjustment of the  mA/kV was utilized to reduce the radiation dose to as low as reasonably  achievable. COMPARISON:  Chest CT dated 12/11/2020    HISTORY:  ORDERING SYSTEM PROVIDED HISTORY: mid epigastric/luq pain  TECHNOLOGIST PROVIDED HISTORY:  Additional Contrast?->None  Reason for exam:->mid epigastric/luq pain  Decision Support Exception - unselect if not a suspected or confirmed  emergency medical condition->Emergency Medical Condition (MA)  Reason for Exam: ABD PAIN; ORDERING SYSTEM PROVIDED HISTORY: short of breath  TECHNOLOGIST PROVIDED HISTORY:  Reason for exam:->short of breath  Decision Support Exception - unselect if not a suspected or confirmed  emergency medical condition->Emergency Medical Condition (MA)  Reason for Exam: SOB, ABD PAIN IN UPPER CENTER    FINDINGS:    Chest:    Mediastinum: Within the mediastinum, no gross intraluminal flap. Pulsation  artifact is seen at the ascending aorta. Calcified subcarinal lymph node, in  keeping with granulomatous disease. Within the mediastinum, no miriam  activity markedly greater than mediastinal background. Thyroid is symmetric  in size. No axillary adenopathy. Within the main, central most right, central most left pulmonary arteries, no  evidence of filling defect to suggest large central pulmonary embolism. Within the opacified branch vessels, no gross embolism is seen. Lungs/pleura: Ground-glass opacity bilaterally, greatest dependently,  favoring atelectasis. No pleural effusion or pneumothorax. Soft Tissues/Bones: Degenerative change of spine. Abdomen/Pelvis:    Organs: No intrahepatic ductal dilatation.   Spleen is within normal limits. Gallbladder is unremarkable. Stomach is decompressed. No pancreatic ductal  dilatation or stranding. 4 mm fat density lesion within the right adrenal  gland, likely myelolipoma. Mild bilateral pelviectasis. No ureteral  calculus. Shotty right upper quadrant portal and peripancreatic lymph nodes,  similar to prior. Abdominal aorta is within normal limits in caliber. GI/Bowel: Diverticulosis, without wanda diverticulitis. Moderate stool  within the colon. Appendix is within normal limits in caliber. Loops of  small and large bowel are nondilated. Fat containing paraumbilical hernia. Pelvis: Bladder is grossly unremarkable. No inguinal adenopathy. Fat  containing inguinal hernias bilaterally. Premalar punctate calcification  within the central prostate. Peritoneum/Retroperitoneum: No free air. Bones/Soft Tissues: Irregularity of the lateral right 12th rib. Mild spinal  degenerative change. Impression: Chest:    No findings to suggest pulmonary embolism. Age-indeterminate right 12th rib fracture. Abdomen pelvis:    3 mm calcification within the central prostate which could reflect prostate  calcification or small calculus within the prostatic urethra. Mild bilateral  pelviectasis. Suggest correlation with urinalysis. Otherwise, no gross  acute intra-abdominal process. Fat containing paraumbilical hernia and fat containing inguinal hernias. CT ABDOMEN PELVIS W IV CONTRAST Additional Contrast? None  Narrative: EXAMINATION:  CT OF THE ABDOMEN AND PELVIS WITH CONTRAST; CTA OF THE CHEST 10/2/2022 9:51 am    TECHNIQUE:  CT of the abdomen and pelvis was performed with the administration of  intravenous contrast. Multiplanar reformatted images are provided for review.   Automated exposure control, iterative reconstruction, and/or weight based  adjustment of the mA/kV was utilized to reduce the radiation dose to as low  as reasonably achievable.; CTA of the chest was performed after the  administration of intravenous contrast.  Multiplanar reformatted images are  provided for review. MIP images are provided for review. Automated exposure  control, iterative reconstruction, and/or weight based adjustment of the  mA/kV was utilized to reduce the radiation dose to as low as reasonably  achievable. COMPARISON:  Chest CT dated 12/11/2020    HISTORY:  ORDERING SYSTEM PROVIDED HISTORY: mid epigastric/luq pain  TECHNOLOGIST PROVIDED HISTORY:  Additional Contrast?->None  Reason for exam:->mid epigastric/luq pain  Decision Support Exception - unselect if not a suspected or confirmed  emergency medical condition->Emergency Medical Condition (MA)  Reason for Exam: ABD PAIN; ORDERING SYSTEM PROVIDED HISTORY: short of breath  TECHNOLOGIST PROVIDED HISTORY:  Reason for exam:->short of breath  Decision Support Exception - unselect if not a suspected or confirmed  emergency medical condition->Emergency Medical Condition (MA)  Reason for Exam: SOB, ABD PAIN IN UPPER CENTER    FINDINGS:    Chest:    Mediastinum: Within the mediastinum, no gross intraluminal flap. Pulsation  artifact is seen at the ascending aorta. Calcified subcarinal lymph node, in  keeping with granulomatous disease. Within the mediastinum, no miriam  activity markedly greater than mediastinal background. Thyroid is symmetric  in size. No axillary adenopathy. Within the main, central most right, central most left pulmonary arteries, no  evidence of filling defect to suggest large central pulmonary embolism. Within the opacified branch vessels, no gross embolism is seen. Lungs/pleura: Ground-glass opacity bilaterally, greatest dependently,  favoring atelectasis. No pleural effusion or pneumothorax. Soft Tissues/Bones: Degenerative change of spine. Abdomen/Pelvis:    Organs: No intrahepatic ductal dilatation. Spleen is within normal limits. Gallbladder is unremarkable. Stomach is decompressed.   No pancreatic ductal  dilatation or stranding. 4 mm fat density lesion within the right adrenal  gland, likely myelolipoma. Mild bilateral pelviectasis. No ureteral  calculus. Shotty right upper quadrant portal and peripancreatic lymph nodes,  similar to prior. Abdominal aorta is within normal limits in caliber. GI/Bowel: Diverticulosis, without wanda diverticulitis. Moderate stool  within the colon. Appendix is within normal limits in caliber. Loops of  small and large bowel are nondilated. Fat containing paraumbilical hernia. Pelvis: Bladder is grossly unremarkable. No inguinal adenopathy. Fat  containing inguinal hernias bilaterally. Premalar punctate calcification  within the central prostate. Peritoneum/Retroperitoneum: No free air. Bones/Soft Tissues: Irregularity of the lateral right 12th rib. Mild spinal  degenerative change. Impression: Chest:    No findings to suggest pulmonary embolism. Age-indeterminate right 12th rib fracture. Abdomen pelvis:    3 mm calcification within the central prostate which could reflect prostate  calcification or small calculus within the prostatic urethra. Mild bilateral  pelviectasis. Suggest correlation with urinalysis. Otherwise, no gross  acute intra-abdominal process. Fat containing paraumbilical hernia and fat containing inguinal hernias. XR CHEST PORTABLE  Narrative: EXAMINATION:  ONE XRAY VIEW OF THE CHEST    10/2/2022 8:52 am    COMPARISON:  11/19/2020    HISTORY:  ORDERING SYSTEM PROVIDED HISTORY: abd pain, short of breath  TECHNOLOGIST PROVIDED HISTORY:  Reason for exam:->abd pain, short of breath  Reason for Exam: abd pain, SOB    FINDINGS:  Heterogeneous left basilar pulmonary opacity is seen, in configuration  slightly different than prior. A 1.5 cm nodular component is seen laterally. No significant pleural effusion. No pneumothorax. Cardiac and mediastinal  silhouettes are similar to prior.   Impression: Left basilar opacity is seen, for which some considerations include  pneumonia, atelectasis, or scar. An underlying pulmonary nodule cannot be  excluded. If this does not resolve, chest CT would be suggested for further  characterization. Labs:   Recent Labs     10/02/22  0827 10/02/22  1823   HGB 15.6 15.6   WBC 10.5 13.5*   PROT 7.5 7.6   LABALBU 4.2 4.5   ALKPHOS 82 89   ALT 30 34   AST 23 26   BILITOT 0.5 0.4        Assessment:    43year old male with history of alcohol abuse, hyperlipidemia, depression, . Presents with Nausea and upper abdominal pain. CT/A/P: fat containing paraumbilical hernia and fat containing inguinal hernia. LFTs wnl, lipase wnl. Mild leukocytosis at 13.5k      Plan:  Plan for EGD today  Possible HIDA scan with ejection fraction  If the above are negative then colonoscopy. Lyndle Lesch, [address] [place] [address].  Also available via [employer]

## 2022-10-03 NOTE — FLOWSHEET NOTE
10/03/22 0235   Vital Signs   Temp 98.4 °F (36.9 °C)   Temp Source Oral   Heart Rate 89   Resp 16   BP (!) 128/100   BP Location Left upper arm   BP Method Automatic   MAP (Calculated) 109.33   Patient Position Semi fowlers   Level of Consciousness 0   MEWS Score 1   Oxygen Therapy   SpO2 97 %   O2 Device None (Room air)   Pt resting in bed, no acute distress. C/o abd pain 10/10. Dilaudid 0.5 mg IV given.   Ed Amezquita RN

## 2022-10-03 NOTE — FLOWSHEET NOTE
10/03/22 0945   Vital Signs   Temp 98.2 °F (36.8 °C)   Temp Source Oral   Heart Rate 100   Heart Rate Source Monitor   Resp 16   BP (!) 142/80   BP Location Left upper arm   BP Method Automatic   MAP (Calculated) 100.67   Patient Position Semi fowlers   Level of Consciousness 0   MEWS Score 1   Pain Assessment   Pain Assessment 0-10   Pain Level 7   Pain Location Abdomen   Opioid-Induced Sedation   POSS Score 1   Oxygen Therapy   SpO2 97 %   O2 Device None (Room air)   Shift assessment complete. See flow sheet. Scheduled medications given, See MAR. Head to toe complete. Vital signs logged and active bowel sounds in all 4 quadrants. Pt awake in bed. Medications taken without difficulty with small sip of water. No further needs noted at this time. Call light and bedside table within reach. Bed in lowest position, wheels locked and side rails up x2.      Juanjo Rogel RN

## 2022-10-03 NOTE — ANESTHESIA PRE PROCEDURE
Department of Anesthesiology  Preprocedure Note       Name:  Dotty Friedman   Age:  43 y.o.  :  1980                                          MRN:  [id number]         Date:  10/3/2022      Surgeon: Dipesh Leslie):  Margarita Baltazar DO    Procedure: Procedure(s):  EGD W/ANES. Medications prior to admission:   Prior to Admission medications    Medication Sig Start Date End Date Taking?  Authorizing Provider   pantoprazole (PROTONIX) 40 MG tablet Take 1 tablet by mouth 2 times daily (before meals) 10/2/22   Trini Rios,    sucralfate (CARAFATE) 1 GM tablet Take 1 tablet by mouth 4 times daily 10/2/22   [facility], DO   aluminum & magnesium hydroxide-simethicone (MAALOX) 348-216-41 MG/5ML SUSP suspension Take 5 mLs by mouth every 6 hours as needed for Indigestion 10/2/22   Trini Rios, DO   fenofibrate (TRICOR) 48 MG tablet TAKE 1 TABLET BY MOUTH EVERY DAY 22   Allan Puga APRN - REBECCA   traZODone (DESYREL) 100 MG tablet TAKE 1 TABLET BY MOUTH DAILY AT NIGHT 22   Yuriy Busch APRN - CNP   aspirin 81 MG EC tablet Take 81 mg by mouth daily    Historical Provider, MD       Current medications:    Current Facility-Administered Medications   Medication Dose Route Frequency Provider Last Rate Last Admin    pantoprazole (PROTONIX) injection 40 mg  40 mg IntraVENous BID Ayde Bull MD        sucralfate (CARAFATE) tablet 1 g  1 g Oral 4x Daily Alma Aldana MD   1 g at 10/03/22 0954    fenofibrate (TRICOR) tablet 54 mg  54 mg Oral Daily Alma Aldana MD   54 mg at 10/03/22 0954    sodium chloride flush 0.9 % injection 5-40 mL  5-40 mL IntraVENous 2 times per day Alma Aldana MD        sodium chloride flush 0.9 % injection 5-40 mL  5-40 mL IntraVENous PRN Alma Aldana MD        0.9 % sodium chloride infusion   IntraVENous PRN Alma Aldana MD        enoxaparin Sodium (LOVENOX) injection 30 mg  30 mg SubCUTAneous BID Daniella Zuñiga Daniel Banks MD   30 mg at 10/03/22 0954    ondansetron (ZOFRAN-ODT) disintegrating tablet 4 mg  4 mg Oral Q8H PRN Robe Ellison MD        Or    ondansetron [facility]) injection 4 mg  4 mg IntraVENous Q6H PRN Robe Ellison MD        polyethylene glycol [facility]) packet 17 g  17 g Oral Daily PRN Robe Ellison MD        acetaminophen (TYLENOL) tablet 650 mg  650 mg Oral Q6H PRN Robe Ellison MD        Or    acetaminophen (TYLENOL) suppository 650 mg  650 mg Rectal Q6H PRN Robe Ellison MD        0.9 % sodium chloride infusion   IntraVENous Continuous Robe Ellison  mL/hr at 10/02/22 2103 New Bag at 10/02/22 2103    promethazine (PHENERGAN) injection 6.25 mg  6.25 mg IntraMUSCular Q6H PRN Robe Ellison MD        ketorolac (TORADOL) injection 15 mg  15 mg IntraVENous Q6H PRN Robe Ellison MD   15 mg at 10/03/22 09    HYDROmorphone (DILAUDID) injection 0.25 mg  0.25 mg IntraVENous Q3H PRN Kathy Burks MD        Or   Ranjeet Crumbly HYDROmorphone (DILAUDID) injection 0.5 mg  0.5 mg IntraVENous Q3H PRN Kathy Burks MD   0.5 mg at 10/03/22 0989       Allergies:  No Known Allergies    Problem List:    Patient Active Problem List   Diagnosis Code    Primary insomnia F51.01    Mild obstructive sleep apnea G47.33    Obesity (BMI 30.0-34. 9) E66.9    Hypertriglyceridemia E78.1    Mixed hyperlipidemia E78.2    Tobacco use Z72.0    Primary hypertension I10    Nausea & vomiting R11.2       Past Medical History:        Diagnosis Date    AA (alcohol abuse)     Depression     Hyperlipidemia        Past Surgical History:  History reviewed. No pertinent surgical history.     Social History:    Social History     Tobacco Use    Smoking status: Former     Packs/day: 1.00     Years: 14.00     Pack years: 14.00     Types: Cigarettes     Start date: 1995     Quit date: 2020     Years since quittin.2    Smokeless tobacco: Former     Types: Chew Substance Use Topics    Alcohol use: Not Currently     Comment: recovering alcoholic 97/9245                                Counseling given: Not Answered      Vital Signs (Current):   Vitals:    10/03/22 0729 10/03/22 0759 10/03/22 0945 10/03/22 1108   BP:   (!) 142/80 (!) 148/80   Pulse:   100 74   Resp: 16 16 16 16   Temp:   98.2 °F (36.8 °C) 97.7 °F (36.5 °C)   TempSrc:   Oral Temporal   SpO2:   97% 97%   Weight:       Height:                                                  BP Readings from Last 3 Encounters:   10/03/22 (!) 148/80   10/02/22 135/77   06/27/22 112/64       NPO Status: Time of last liquid consumption: 2359                        Time of last solid consumption: 2359                        Date of last liquid consumption: 10/02/22                        Date of last solid food consumption: 10/02/22    BMI:   Wt Readings from Last 3 Encounters:   10/02/22 238 lb 1.6 oz (108 kg)   10/02/22 230 lb (104.3 kg)   06/27/22 232 lb (105.2 kg)     Body mass index is 32.29 kg/m².     CBC:   Lab Results   Component Value Date/Time    WBC 13.5 10/02/2022 06:23 PM    RBC 5.39 10/02/2022 06:23 PM    HGB 15.6 10/02/2022 06:23 PM    HCT 45.3 10/02/2022 06:23 PM    MCV 84.2 10/02/2022 06:23 PM    RDW 14.0 10/02/2022 06:23 PM     10/02/2022 06:23 PM       CMP:   Lab Results   Component Value Date/Time     10/03/2022 05:53 AM    K 4.2 10/03/2022 05:53 AM     10/03/2022 05:53 AM    CO2 26 10/03/2022 05:53 AM    BUN 8 10/03/2022 05:53 AM    CREATININE 0.9 10/03/2022 05:53 AM    GFRAA >60 10/03/2022 05:53 AM    AGRATIO 1.5 10/02/2022 06:23 PM    LABGLOM >60 10/03/2022 05:53 AM    GLUCOSE 125 10/03/2022 05:53 AM    PROT 7.6 10/02/2022 06:23 PM    CALCIUM 8.7 10/03/2022 05:53 AM    BILITOT 0.4 10/02/2022 06:23 PM    ALKPHOS 89 10/02/2022 06:23 PM    AST 26 10/02/2022 06:23 PM    ALT 34 10/02/2022 06:23 PM       POC Tests:   Recent Labs     10/03/22  0743   POCGLU 106*       Coags: No results found for: PROTIME, INR, APTT    HCG (If Applicable): No results found for: PREGTESTUR, PREGSERUM, HCG, HCGQUANT     ABGs: No results found for: PHART, PO2ART, VXL9FYO, ZJR3DBO, BEART, [id number]     Type & Screen (If Applicable):  No results found for: LABABO, LABRH    Drug/Infectious Status (If Applicable):  No results found for: HIV, HEPCAB    COVID-19 Screening (If Applicable):   Lab Results   Component Value Date/Time    COVID19 NOT DETECTED 10/02/2022 07:22 PM    COVID19 NOT DETECTED 12/02/2020 01:06 PM           Anesthesia Evaluation  Patient summary reviewed and Nursing notes reviewed no history of anesthetic complications:   Airway: Mallampati: III  TM distance: >3 FB   Neck ROM: full  Mouth opening: > = 3 FB   Dental: normal exam         Pulmonary:normal exam    (+) sleep apnea:                             Cardiovascular:    (+) hypertension:,                   Neuro/Psych:   (+) psychiatric history (h/o ETOH abuse):            GI/Hepatic/Renal: Neg GI/Hepatic/Renal ROS       (-) GERD, liver disease and no renal disease       Endo/Other: Negative Endo/Other ROS       (-) diabetes mellitus               Abdominal:             Vascular: negative vascular ROS. Other Findings:           Anesthesia Plan      MAC     ASA 3       Induction: intravenous. Anesthetic plan and risks discussed with patient. Plan discussed with CRNA.                     Sofia Gaston MD   10/3/2022 no purulent drainage/no drainage/no redness/no red streaks/no fever

## 2022-10-03 NOTE — ANESTHESIA POSTPROCEDURE EVALUATION
Department of Anesthesiology  Postprocedure Note    Patient: Osmin Hernandez  MRN: [id number]  YOB: 1980  Date of evaluation: 10/3/2022      Procedure Summary     Date: 10/03/22 Room / Location: [address] [place] [address] / [facility]    Anesthesia Start: 3019 Anesthesia Stop: 1200    Procedure: EGD W/ANES.  Diagnosis:       Abdominal pain, unspecified abdominal location      (ABDOMINAL PAIN)    Surgeons: Filemon Castro DO Responsible Provider: Michelle Parada MD    Anesthesia Type: General ASA Status: 3          Anesthesia Type: General    Gayatri Phase I: Gayatri Score: 10    Gayatri Phase II: Gayatri Score: 10      Anesthesia Post Evaluation    Patient location during evaluation: bedside  Level of consciousness: awake  Airway patency: patent  Nausea & Vomiting: no nausea  Complications: no  Cardiovascular status: blood pressure returned to baseline  Respiratory status: acceptable  Hydration status: euvolemic

## 2022-10-03 NOTE — H&P
[facility] ENDOSCOPY  Procedure H&P    Patient: Isaias Bergeron MRN: [id number]     YOB: 1980  Age: 43 y.o. Sex: male    Unit: [address] [place] [address] ENDOSCOPY Room/Bed: ENDO/NONE Location: [address]     Procedure: Procedure(s):  EGD W/ANES. Indication:upper abdominal pain  Referring  Physician:        Brief history: Nausea    Nurses past medical history notes reviewed and agreed. Medications reviewed. Allergies: Patient has no known allergies. Allergies noted: Yes     Past Medical History:   Past Medical History:   Diagnosis Date    AA (alcohol abuse)     Depression     Hyperlipidemia        Past Surgical History: History reviewed. No pertinent surgical history.     Social History:   Social History     Socioeconomic History    Marital status: Single     Spouse name: Not on file    Number of children: 3    Years of education: Not on file    Highest education level: Not on file   Occupational History    Not on file   Tobacco Use    Smoking status: Former     Packs/day: 1.00     Years: 14.00     Pack years: 14.00     Types: Cigarettes     Start date: 1995     Quit date: 2020     Years since quittin.2    Smokeless tobacco: Former     Types: Chew   Vaping Use    Vaping Use: Never used   Substance and Sexual Activity    Alcohol use: Not Currently     Comment: recovering alcoholic     Drug use: Not Currently     Types: 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    Sexual activity: Yes     Partners: Female   Other Topics Concern    Not on file   Social History Narrative    Not on file     Social Determinants of Health     Financial Resource Strain: Low Risk     Difficulty of Paying Living Expenses: Not hard at all   Food Insecurity: No Food Insecurity    Worried About [employer] in the Last Year: Never true    [facility] in the Last Year: Never true   Transportation Needs: No Transportation Needs    Lack of Transportation (Medical): No    Lack of Transportation (Non-Medical): No   Physical Activity: Not on file   Stress: Not on file   Social Connections: Not on file   Intimate Partner Violence: Not on file   Housing Stability: Low Risk     Unable to Pay for Housing in the Last Year: No    Number of Places Lived in the Last Year: 1    Unstable Housing in the Last Year: No       Family History:   Family History   Problem Relation Age of Onset    Alcohol Abuse Maternal Grandfather     Heart Attack Paternal Grandfather     Alcohol Abuse Paternal Grandfather        Home Medications:   Prior to Admission medications    Medication Sig Start Date End Date Taking?  Authorizing Provider   pantoprazole (PROTONIX) 40 MG tablet Take 1 tablet by mouth 2 times daily (before meals) 10/2/22   Trini Blanca, DO   sucralfate (CARAFATE) 1 GM tablet Take 1 tablet by mouth 4 times daily 10/2/22   [facility]HAB[facility], DO   aluminum & magnesium hydroxide-simethicone (MAALOX) 663-173-27 MG/5ML SUSP suspension Take 5 mLs by mouth every 6 hours as needed for Indigestion 10/2/22   Tirni Rios, DO   fenofibrate (TRICOR) 48 MG tablet TAKE 1 TABLET BY MOUTH EVERY DAY 8/26/22   AMANDA Lester CNP   traZODone (DESYREL) 100 MG tablet TAKE 1 TABLET BY MOUTH DAILY AT NIGHT 5/16/22   AMANDA Richardson CNP   aspirin 81 MG EC tablet Take 81 mg by mouth daily    Historical Provider, MD       Review of Systems:  Weight Loss: No  Dysphagia: No  Dyspepsia: No    Physical Exam:   Vital Signs: BP (!) 148/80   Pulse 74   Temp 97.7 °F (36.5 °C) (Temporal)   Resp 16   Ht 6' (1.829 m)   Wt 238 lb 1.6 oz (108 kg)   SpO2 97%   BMI 32.29 kg/m²   Vital signs reviewed:Yes    HEENT:Normal  Cardiac:Normal  Chest:Normal  Abdomen:Normal  Exts: Normal  Neuro:Normal    Labs:  Admission on 10/02/2022   Component Date Value Ref Range Status    WBC 10/02/2022 13.5 (A)  4.0 - 11.0 K/uL Final    RBC 10/02/2022 5.39  4.20 - 5.90 M/uL Final    Hemoglobin 10/02/2022 15.6  13.5 - 17.5 g/dL Final Hematocrit 10/02/2022 45.3  40.5 - 52.5 % Final    MCV 10/02/2022 84.2  80.0 - 100.0 fL Final    MCH 10/02/2022 28.9  26.0 - 34.0 pg Final    MCHC 10/02/2022 34.3  31.0 - 36.0 g/dL Final    RDW 10/02/2022 14.0  12.4 - 15.4 % Final    Platelets 49/92/2639 227  135 - 450 K/uL Final    MPV 10/02/2022 8.3  5.0 - 10.5 fL Final    Neutrophils % 10/02/2022 79.3  % Final    Lymphocytes % 10/02/2022 10.8  % Final    Monocytes % 10/02/2022 7.7  % Final    Eosinophils % 10/02/2022 1.7  % Final    Basophils % 10/02/2022 0.5  % Final    Neutrophils Absolute 10/02/2022 10.7 (A)  1.7 - 7.7 K/uL Final    Lymphocytes Absolute 10/02/2022 1.5  1.0 - 5.1 K/uL Final    Monocytes Absolute 10/02/2022 1.0  0.0 - 1.3 K/uL Final    Eosinophils Absolute 10/02/2022 0.2  0.0 - 0.6 K/uL Final    Basophils Absolute 10/02/2022 0.1  0.0 - 0.2 K/uL Final    Sodium 10/02/2022 136  136 - 145 mmol/L Final    Potassium reflex Magnesium 10/02/2022 4.2  3.5 - 5.1 mmol/L Final    Chloride 10/02/2022 98 (A)  99 - 110 mmol/L Final    CO2 10/02/2022 28  21 - 32 mmol/L Final    Anion Gap 10/02/2022 10  3 - 16 Final    Glucose 10/02/2022 128 (A)  70 - 99 mg/dL Final    BUN 10/02/2022 11  7 - 20 mg/dL Final    Creatinine 10/02/2022 1.1  0.9 - 1.3 mg/dL Final    GFR Non- 10/02/2022 >60  >60 Final    GFR  10/02/2022 >60  >60 Final    Calcium 10/02/2022 9.0  8.3 - 10.6 mg/dL Final    Total Protein 10/02/2022 7.6  6.4 - 8.2 g/dL Final    Albumin 10/02/2022 4.5  3.4 - 5.0 g/dL Final    Albumin/Globulin Ratio 10/02/2022 1.5  1.1 - 2.2 Final    Total Bilirubin 10/02/2022 0.4  0.0 - 1.0 mg/dL Final    Alkaline Phosphatase 10/02/2022 89  40 - 129 U/L Final    ALT 10/02/2022 34  10 - 40 U/L Final    AST 10/02/2022 26  15 - 37 U/L Final    Troponin 10/02/2022 <0.01  <0.01 ng/mL Final    Lipase 10/02/2022 22.0  13.0 - 60.0 U/L Final    Lactic Acid 10/02/2022 2.1 (A)  0.4 - 2.0 mmol/L Final    SARS-CoV-2 RNA, RT PCR 10/02/2022 NOT DETECTED NOT DETECTED Final    INFLUENZA A 10/02/2022 NOT DETECTED  NOT DETECTED Final    INFLUENZA B 10/02/2022 NOT DETECTED  NOT DETECTED Final    Sodium 10/03/2022 135 (A)  136 - 145 mmol/L Final    Potassium reflex Magnesium 10/03/2022 4.2  3.5 - 5.1 mmol/L Final    Chloride 10/03/2022 100  99 - 110 mmol/L Final    CO2 10/03/2022 26  21 - 32 mmol/L Final    Anion Gap 10/03/2022 9  3 - 16 Final    Glucose 10/03/2022 125 (A)  70 - 99 mg/dL Final    BUN 10/03/2022 8  7 - 20 mg/dL Final    Creatinine 10/03/2022 0.9  0.9 - 1.3 mg/dL Final    GFR Non- 10/03/2022 >60  >60 Final    GFR  10/03/2022 >60  >60 Final    Calcium 10/03/2022 8.7  8.3 - 10.6 mg/dL Final    Amphetamine Screen, Urine 10/02/2022 Neg  Negative <1000ng/mL Final    Barbiturate Screen, Ur 10/02/2022 Neg  Negative <200 ng/mL Final    Benzodiazepine Screen, Urine 10/02/2022 Neg  Negative <200 ng/mL Final    Cannabinoid Scrn, Ur 10/02/2022 Neg  Negative <50 ng/mL Final    Cocaine Metabolite Screen, Urine 10/02/2022 Neg  Negative <300 ng/mL Final    Opiate Scrn, Ur 10/02/2022 Neg  Negative <300 ng/mL Final    PCP Screen, Urine 10/02/2022 Neg  Negative <25 ng/mL Final    Methadone Screen, Urine 10/02/2022 Neg  Negative <300 ng/mL Final    Oxycodone Urine 10/02/2022 Neg  Negative <100 ng/ml Final    FENTANYL SCREEN, URINE 10/02/2022 Neg  Negative <5 ng/mL Final    pH, UA 10/02/2022 5.5   Final    Drug Screen Comment: 10/02/2022 see below   Final    POC Glucose 10/03/2022 158 (A)  70 - 99 mg/dl Final    Performed on 10/03/2022 ACCU-CHEK   Final    POC Glucose 10/03/2022 106 (A)  70 - 99 mg/dl Final    Performed on 10/03/2022 ACCU-CHEK   Final   Admission on 10/02/2022, Discharged on 10/02/2022   Component Date Value Ref Range Status    Color, UA 10/02/2022 Yellow  Straw/Yellow Final    Clarity, UA 10/02/2022 Clear  Clear Final    Glucose, Ur 10/02/2022 Negative  Negative mg/dL Final    Bilirubin Urine 10/02/2022 Negative  Negative Final 10/02/2022 1.0  0.9 - 1.3 mg/dL Final    GFR Non- 10/02/2022 >60  >60 Final    GFR  10/02/2022 >60  >60 Final    Calcium 10/02/2022 9.0  8.3 - 10.6 mg/dL Final    Total Protein 10/02/2022 7.5  6.4 - 8.2 g/dL Final    Albumin 10/02/2022 4.2  3.4 - 5.0 g/dL Final    Albumin/Globulin Ratio 10/02/2022 1.3  1.1 - 2.2 Final    Total Bilirubin 10/02/2022 0.5  0.0 - 1.0 mg/dL Final    Alkaline Phosphatase 10/02/2022 82  40 - 129 U/L Final    ALT 10/02/2022 30  10 - 40 U/L Final    AST 10/02/2022 23  15 - 37 U/L Final    Lipase 10/02/2022 19.0  13.0 - 60.0 U/L Final    Ventricular Rate 10/02/2022 69  BPM Final    Atrial Rate 10/02/2022 69  BPM Final    P-R Interval 10/02/2022 154  ms Final    QRS Duration 10/02/2022 98  ms Final    Q-T Interval 10/02/2022 392  ms Final    QTc Calculation (Bazett) 10/02/2022 420  ms Final    P Axis 10/02/2022 35  degrees Final    R Axis 10/02/2022 67  degrees Final    T Axis 10/02/2022 50  degrees Final    Diagnosis 10/02/2022 Normal sinus rhythmNormal ECGNo previous ECGs availableConfirmed by Chikis Richardson MD, Jorge Miller ([id number]) on 10/2/2022 3:07:34 PM   Final    Troponin 10/02/2022 <0.01  <0.01 ng/mL Final    WBC, UA 10/02/2022 0-2  0 - 5 /HPF Final    RBC, UA 10/02/2022 None seen  0 - 4 /HPF Final        Imaging:  No orders to display       ASA:2    Mallampati Score: II    Sedation planned:MAC    Patient in acceptable condition for procedure:Yes    11:56 AM 10/3/2022    Yani Burton, DO      Please note that some or all of this record was generated using voice recognition software. If there are any questions about the content of this document, please contact the author as some errors in transcription may have occurred. The patient and I discussed that this is an elective procedure/surgery. We discussed the risks of the procedure/surgery, including but not limited to what is outlined in the signed informed consent.  We also discussed the risk of diane COVID 19 while in the facility. We discussed the increased risk of a bad outcome should the patient contract COVID 19 during the post-procedural/post-operative period, given the patients current health condition, chronic conditions, and the added risk of COVID 19 in light of these conditions. The patient and I also discussed the risk of further postponing the procedure/surgery and other treatment alternatives, including non-procedural/surgical treatments. Understanding all of the risks, benefits, and alternatives, the patient made an informed decision to proceed with the procedure/surgery.

## 2022-10-03 NOTE — PLAN OF CARE
Problem: Discharge Planning  Goal: Discharge to home or other facility with appropriate resources  Outcome: Progressing  Flowsheets (Taken 10/3/2022 6894)  Discharge to home or other facility with appropriate resources: Identify barriers to discharge with patient and caregiver     Problem: Pain  Goal: Verbalizes/displays adequate comfort level or baseline comfort level  Outcome: Progressing     Problem: Gastrointestinal - Adult  Goal: Minimal or absence of nausea and vomiting  Outcome: Progressing  Goal: Maintains adequate nutritional intake  Outcome: Progressing

## 2022-10-03 NOTE — PROCEDURES
EGD PROCEDURE NOTE         Esophagogastroduodenoscopy Procedure Note     Patient: Sabrina Lal MRN: [id number]   YOB: 1980 Age: 43 y.o. Sex: male   Unit: [facility] ENDOSCOPY Room/Bed: ENDO/NONE Location: [address]    Admitting Physician: Sundar Ramos     Primary Care Physician: AMANDA Vann CNP      DATE OF PROCEDURE: 10/3/2022  PROCEDURE: Esophagogastroduodenoscopy  INDICATION: This is a 43y.o. year old male who presents today Upper abdominal pain  ENDOSCOPIST: Enrique Quintana DO    POSTOPERATIVE DIAGNOSIS:  normal EGD    PLAN:  HIDA scan with ejection fraction      INFORMED CONSENT:  Informed consent for esophagogastoduodenoscopy was obtained. The benefits and risks including adverse medicine reaction have been explained. The patient's questions were answered and the patient agreed to proceed. ASA:  ASA 2 - Patient with mild systemic disease with no functional limitations    SEDATION: MAC     The patient's vital signs, cardiac status, pulmonary status, abdominal status and mental status were stable for the procedure. The patient's vitals signs and respiratory function as monitored by oxygen saturation were stable throughout    Procedure Details: The Olympus videoendoscope was inserted into the mouth and carefully passed into the esophagus, through the stomach and to the distal duodenum. Antegrade and retrograde examination of the upper gi tract was carefully performed. Findings: The esophagus is normal.  The z-line is distinct without lesions or irregularities. There is no evidence of Holloway's esophagus. The scope easily passed into the stomach. The mucosa of the cardia, fundus, body and antrum of the stomach is normal.  The pylorus is patent and of normal contour. The scope easily advanced into the duodenal bulb and down to the distal duodenum.   Ante-and retrograde exam of the duodenum is normal.    Gastric or Duodenal ulcer present: No    Estimated Blood Loss: None      Signed By: Alex Taylor DO

## 2022-10-04 ENCOUNTER — APPOINTMENT (OUTPATIENT)
Dept: NUCLEAR MEDICINE | Age: 42
DRG: 419 | End: 2022-10-04
Payer: COMMERCIAL

## 2022-10-04 PROBLEM — K81.9 CHOLECYSTITIS: Status: ACTIVE | Noted: 2022-10-04

## 2022-10-04 PROCEDURE — G0378 HOSPITAL OBSERVATION PER HR: HCPCS

## 2022-10-04 PROCEDURE — 2580000003 HC RX 258: Performed by: SURGERY

## 2022-10-04 PROCEDURE — 2580000003 HC RX 258: Performed by: INTERNAL MEDICINE

## 2022-10-04 PROCEDURE — 78226 HEPATOBILIARY SYSTEM IMAGING: CPT

## 2022-10-04 PROCEDURE — 6370000000 HC RX 637 (ALT 250 FOR IP): Performed by: INTERNAL MEDICINE

## 2022-10-04 PROCEDURE — 99221 1ST HOSP IP/OBS SF/LOW 40: CPT | Performed by: SURGERY

## 2022-10-04 PROCEDURE — 96372 THER/PROPH/DIAG INJ SC/IM: CPT

## 2022-10-04 PROCEDURE — 99232 SBSQ HOSP IP/OBS MODERATE 35: CPT | Performed by: INTERNAL MEDICINE

## 2022-10-04 PROCEDURE — 6360000002 HC RX W HCPCS: Performed by: INTERNAL MEDICINE

## 2022-10-04 PROCEDURE — 96366 THER/PROPH/DIAG IV INF ADDON: CPT

## 2022-10-04 PROCEDURE — 3430000000 HC RX DIAGNOSTIC RADIOPHARMACEUTICAL: Performed by: INTERNAL MEDICINE

## 2022-10-04 PROCEDURE — C9113 INJ PANTOPRAZOLE SODIUM, VIA: HCPCS | Performed by: INTERNAL MEDICINE

## 2022-10-04 PROCEDURE — 78227 HEPATOBIL SYST IMAGE W/DRUG: CPT

## 2022-10-04 PROCEDURE — 96365 THER/PROPH/DIAG IV INF INIT: CPT

## 2022-10-04 PROCEDURE — 6360000002 HC RX W HCPCS: Performed by: SURGERY

## 2022-10-04 PROCEDURE — 1200000000 HC SEMI PRIVATE

## 2022-10-04 PROCEDURE — A9537 TC99M MEBROFENIN: HCPCS | Performed by: INTERNAL MEDICINE

## 2022-10-04 PROCEDURE — 96376 TX/PRO/DX INJ SAME DRUG ADON: CPT

## 2022-10-04 RX ADMIN — Medication 6.2 MILLICURIE: at 10:48

## 2022-10-04 RX ADMIN — Medication 10 ML: at 23:14

## 2022-10-04 RX ADMIN — PIPERACILLIN AND TAZOBACTAM 4500 MG: 4; .5 INJECTION, POWDER, FOR SOLUTION INTRAVENOUS at 16:55

## 2022-10-04 RX ADMIN — PANTOPRAZOLE SODIUM 40 MG: 40 INJECTION, POWDER, FOR SOLUTION INTRAVENOUS at 09:21

## 2022-10-04 RX ADMIN — PIPERACILLIN AND TAZOBACTAM 3375 MG: 3; .375 INJECTION, POWDER, FOR SOLUTION INTRAVENOUS at 23:19

## 2022-10-04 RX ADMIN — SUCRALFATE 1 G: 1 TABLET ORAL at 23:14

## 2022-10-04 RX ADMIN — KETOROLAC TROMETHAMINE 15 MG: 30 INJECTION, SOLUTION INTRAMUSCULAR; INTRAVENOUS at 20:09

## 2022-10-04 RX ADMIN — SODIUM CHLORIDE: 9 INJECTION, SOLUTION INTRAVENOUS at 02:08

## 2022-10-04 RX ADMIN — KETOROLAC TROMETHAMINE 15 MG: 30 INJECTION, SOLUTION INTRAMUSCULAR; INTRAVENOUS at 05:57

## 2022-10-04 RX ADMIN — SUCRALFATE 1 G: 1 TABLET ORAL at 16:55

## 2022-10-04 RX ADMIN — ENOXAPARIN SODIUM 30 MG: 100 INJECTION SUBCUTANEOUS at 09:22

## 2022-10-04 RX ADMIN — PANTOPRAZOLE SODIUM 40 MG: 40 INJECTION, POWDER, FOR SOLUTION INTRAVENOUS at 23:14

## 2022-10-04 RX ADMIN — SODIUM CHLORIDE: 9 INJECTION, SOLUTION INTRAVENOUS at 16:51

## 2022-10-04 RX ADMIN — ENOXAPARIN SODIUM 30 MG: 100 INJECTION SUBCUTANEOUS at 23:14

## 2022-10-04 RX ADMIN — KETOROLAC TROMETHAMINE 15 MG: 30 INJECTION, SOLUTION INTRAMUSCULAR; INTRAVENOUS at 13:25

## 2022-10-04 ASSESSMENT — PAIN DESCRIPTION - LOCATION
LOCATION: HEAD;ABDOMEN
LOCATION: ABDOMEN
LOCATION: HEAD

## 2022-10-04 ASSESSMENT — PAIN SCALES - GENERAL
PAINLEVEL_OUTOF10: 6
PAINLEVEL_OUTOF10: 5
PAINLEVEL_OUTOF10: 4
PAINLEVEL_OUTOF10: 5

## 2022-10-04 ASSESSMENT — PAIN DESCRIPTION - DESCRIPTORS: DESCRIPTORS: SORE

## 2022-10-04 ASSESSMENT — PAIN DESCRIPTION - ORIENTATION: ORIENTATION: RIGHT;UPPER

## 2022-10-04 NOTE — PLAN OF CARE
Problem: Discharge Planning  Goal: Discharge to home or other facility with appropriate resources  10/3/2022 2354 by Lilliana Rothman RN  Outcome: Progressing  10/3/2022 1745 by Otto Lewis RN  Outcome: Progressing  Flowsheets (Taken 10/3/2022 7658)  Discharge to home or other facility with appropriate resources: Identify barriers to discharge with patient and caregiver     Problem: Pain  Goal: Verbalizes/displays adequate comfort level or baseline comfort level  10/3/2022 2354 by Lilliana Rothman RN  Outcome: Progressing  10/3/2022 1745 by Otto Lewis RN  Outcome: Progressing     Problem: Gastrointestinal - Adult  Goal: Minimal or absence of nausea and vomiting  10/3/2022 2354 by Lililana Rothman RN  Outcome: Progressing  10/3/2022 1745 by Otto Lewis RN  Outcome: Progressing  Goal: Maintains adequate nutritional intake  10/3/2022 2354 by Lilliana Rothman RN  Outcome: Progressing  10/3/2022 1745 by Otto Lewis RN  Outcome: Progressing

## 2022-10-04 NOTE — PLAN OF CARE
Problem: Discharge Planning  Goal: Discharge to home or other facility with appropriate resources  Outcome: Progressing     Problem: Pain  Goal: Verbalizes/displays adequate comfort level or baseline comfort level  Outcome: Progressing     Problem: Gastrointestinal - Adult  Goal: Minimal or absence of nausea and vomiting  Outcome: Progressing  Goal: Maintains adequate nutritional intake  Outcome: Progressing

## 2022-10-04 NOTE — PROGRESS NOTES
Hospitalist Progress Note      PCP: AMANDA Montelongo - CNP    Date of Admission: 10/2/2022    Chief Complaint: abd pain. Subjective: pt to HIDA scan this am.       Medications:  Reviewed    Infusion Medications    sodium chloride      sodium chloride 100 mL/hr at 10/04/22 0208     Scheduled Medications    pantoprazole  40 mg IntraVENous BID    sucralfate  1 g Oral 4x Daily    fenofibrate  54 mg Oral Daily    sodium chloride flush  5-40 mL IntraVENous 2 times per day    enoxaparin  30 mg SubCUTAneous BID     PRN Meds: sodium chloride flush, sodium chloride, ondansetron **OR** ondansetron, polyethylene glycol, acetaminophen **OR** acetaminophen, promethazine, ketorolac      Intake/Output Summary (Last 24 hours) at 10/4/2022 1432  Last data filed at 10/3/2022 1806  Gross per 24 hour   Intake 240 ml   Output --   Net 240 ml       Physical Exam Performed:    /78   Pulse 81   Temp 98.3 °F (36.8 °C) (Oral)   Resp 16   Ht 6' (1.829 m)   Wt 238 lb 1.6 oz (108 kg)   SpO2 100%   BMI 32.29 kg/m²     General appearance: No apparent distress, appears stated age and cooperative. HEENT: Pupils equal, round, and reactive to light. Conjunctivae/corneas clear. Neck: Supple, with full range of motion. No jugular venous distention. Trachea midline. Respiratory:  Normal respiratory effort. Clear to auscultation, bilaterally without Rales/Wheezes/Rhonchi. Cardiovascular: Regular rate and rhythm with normal S1/S2 without murmurs, rubs or gallops. Abdomen: Soft, non-tender, non-distended with normal bowel sounds. Musculoskeletal: No clubbing, cyanosis or edema bilaterally. Full range of motion without deformity. Skin: Skin color, texture, turgor normal.  No rashes or lesions. Neurologic:  Neurovascularly intact without any focal sensory/motor deficits.  Cranial nerves: II-XII intact, grossly non-focal.  Psychiatric: Alert and oriented, thought content appropriate, normal insight  Capillary Refill: Brisk, 3 seconds, normal   Peripheral Pulses: +2 palpable, equal bilaterally       Labs:   Recent Labs     10/02/22  0827 10/02/22  1823   WBC 10.5 13.5*   HGB 15.6 15.6   HCT 45.6 45.3    227     Recent Labs     10/02/22  0827 10/02/22  1823 10/03/22  0553   * 136 135*   K 4.2 4.2 4.2   CL 99 98* 100   CO2 22 28 26   BUN 13 11 8   CREATININE 1.0 1.1 0.9   CALCIUM 9.0 9.0 8.7     Recent Labs     10/02/22  0827 10/02/22  1823   AST 23 26   ALT 30 34   BILITOT 0.5 0.4   ALKPHOS 82 89     No results for input(s): INR in the last 72 hours. Recent Labs     10/02/22  0827 10/02/22  1823   TROPONINI <0.01 <0.01       Urinalysis:      Lab Results   Component Value Date/Time    NITRU Negative 10/02/2022 08:27 AM    WBCUA 0-2 10/02/2022 08:27 AM    RBCUA None seen 10/02/2022 08:27 AM    BLOODU Negative 10/02/2022 08:27 AM    SPECGRAV <=1.005 10/02/2022 08:27 AM    GLUCOSEU Negative 10/02/2022 08:27 AM       Radiology:  NM HEPATOBILIARY SCAN W PHARMACOLOGICAL INTERVENTION    (Results Pending)           Assessment/Plan:    Active Hospital Problems    Diagnosis     Intractable nausea and vomiting [R11.2]      Priority: Medium       Abd Pain. PUD ruled out  Strong suspect severely symptomatic Biliary Colic. Persisting pain and tender on exam  GI involved. - EGD today - unremarkable              - HIDA scan - prelim report gall bladder non visualized. - will ask surgery team to evaluate. - IVF support. - ok for PRN toradol              - avoid IV narcotics. - cont PPI. DVT Prophylaxis: lovenox  Diet: ADULT DIET; Clear Liquid  Code Status: Full Code        Dispo - Med surg.           Matthieu Pollard MD

## 2022-10-04 NOTE — PROGRESS NOTES
Shift assessment complete. See flow sheet. Due medications administered per MD orders. See MAR. Vital signs stable. Patient is alert and oriented x 4. Pt denies any present needs/concerns.  Call light explained and in reach.]

## 2022-10-04 NOTE — FLOWSHEET NOTE
10/04/22 0915   Vital Signs   Temp 98.8 °F (37.1 °C)   Temp Source Oral   Heart Rate 85   Heart Rate Source Monitor   Resp 16   /75   BP Location Left upper arm   BP Method Automatic   MAP (Calculated) 95.33   Patient Position Semi fowlers   Level of Consciousness 0   MEWS Score 1   Pain Assessment   Pain Assessment 0-10   Pain Level 6   Opioid-Induced Sedation   POSS Score 1   Oxygen Therapy   SpO2 97 %   O2 Device None (Room air)   Shift assessment complete. See flow sheet. Scheduled medications given, See MAR. Head to toe complete. Vital signs logged and active bowel sounds in all 4 quadrants. Pt awake in bed. PO medications not given due to NPO status. No further needs noted at this time. Call light and bedside table within reach. Bed in lowest position, wheels locked and side rails up x2.      [place] ANT Reed

## 2022-10-04 NOTE — CONSULTS
Department of General Surgery Consult    PATIENT NAME: Riddhi Luo   YOB: 1980    ADMISSION DATE: 10/2/2022  5:32 PM      TODAY'S DATE: 10/4/2022    Reason for Consult: Cholecystitis    Chief Complaint: Abdominal pain    Requesting Physician: Darian Hull    HISTORY OF PRESENT ILLNESS:              The patient is a 43 y.o. male who presents with abdominal pain. This started over the weekend. He was seen in the emergency room and treated for gastritis. Histamine was improved and he was sent home. However, his symptoms promptly recurred. He had some nausea and vomiting but states that has resolved. He denies fever or chills. Nothing really seem to make the pain better or worse. The pain was about a 10 at that time but now is down to a 5    Past Medical History:        Diagnosis Date    AA (alcohol abuse)     Depression     Hyperlipidemia        Past Surgical History:        Procedure Laterality Date    UPPER GASTROINTESTINAL ENDOSCOPY N/A 10/3/2022    EGD W/ANES.  performed by Federico Tracy DO at [facility]       Current Medications:   Current Facility-Administered Medications: piperacillin-tazobactam (ZOSYN) 3,375 mg in sodium chloride 0.9 % 100 mL IVPB extended infusion (mini-bag), 3,375 mg, IntraVENous, Q8H  pantoprazole (PROTONIX) injection 40 mg, 40 mg, IntraVENous, BID  sucralfate (CARAFATE) tablet 1 g, 1 g, Oral, 4x Daily  fenofibrate (TRICOR) tablet 54 mg, 54 mg, Oral, Daily  sodium chloride flush 0.9 % injection 5-40 mL, 5-40 mL, IntraVENous, 2 times per day  sodium chloride flush 0.9 % injection 5-40 mL, 5-40 mL, IntraVENous, PRN  0.9 % sodium chloride infusion, , IntraVENous, PRN  enoxaparin Sodium (LOVENOX) injection 30 mg, 30 mg, SubCUTAneous, BID  ondansetron (ZOFRAN-ODT) disintegrating tablet 4 mg, 4 mg, Oral, Q8H PRN **OR** ondansetron (ZOFRAN) injection 4 mg, 4 mg, IntraVENous, Q6H PRN  polyethylene glycol (GLYCOLAX) packet 17 g, 17 g, Oral, Daily PRN  acetaminophen (TYLENOL) tablet 650 mg, 650 mg, Oral, Q6H PRN **OR** acetaminophen (TYLENOL) suppository 650 mg, 650 mg, Rectal, Q6H PRN  0.9 % sodium chloride infusion, , IntraVENous, Continuous  promethazine (PHENERGAN) injection 6.25 mg, 6.25 mg, IntraMUSCular, Q6H PRN  ketorolac (TORADOL) injection 15 mg, 15 mg, IntraVENous, Q6H PRN  Prior to Admission medications    Medication Sig Start Date End Date Taking? Authorizing Provider   pantoprazole (PROTONIX) 40 MG tablet Take 1 tablet by mouth 2 times daily (before meals) 10/2/22   Trini Rios, DO   sucralfate (CARAFATE) 1 GM tablet Take 1 tablet by mouth 4 times daily 10/2/22   [facility], DO   aluminum & magnesium hydroxide-simethicone (MAALOX) 192-475-38 MG/5ML SUSP suspension Take 5 mLs by mouth every 6 hours as needed for Indigestion 10/2/22   Trini Rios, DO   fenofibrate (TRICOR) 48 MG tablet TAKE 1 TABLET BY MOUTH EVERY DAY 8/26/22   AMANDA Vann CNP   traZODone (DESYREL) 100 MG tablet TAKE 1 TABLET BY MOUTH DAILY AT NIGHT 5/16/22   Easter Kayser, APRN - CNP   aspirin 81 MG EC tablet Take 81 mg by mouth daily    Historical Provider, MD        Allergies:  Patient has no known allergies. Social History:   TOBACCO:   reports that he quit smoking about 2 years ago. His smoking use included cigarettes. He started smoking about 27 years ago. He has a 14.00 pack-year smoking history. He has quit using smokeless tobacco.  His smokeless tobacco use included chew. ETOH:   reports that he does not currently use alcohol. DRUGS:   reports that he does not currently use drugs after having used the following drugs: Methamphetamines (Crystal Meth).       Family History:        Problem Relation Age of Onset    Alcohol Abuse Maternal Grandfather     Heart Attack Paternal Grandfather     Alcohol Abuse Paternal Grandfather        REVIEW OF SYSTEMS:  CONSTITUTIONAL:  negative  HEENT:  negative  RESPIRATORY:  negative  CARDIOVASCULAR: negative  GASTROINTESTINAL:  negative except for nausea, vomiting, and abdominal pain  GENITOURINARY:  negative  HEMATOLOGIC/LYMPHATIC:  negative  NEUROLOGICAL:  Negative  * All other ROS reviewed and negative. PHYSICAL EXAM:  VITALS:  /78   Pulse 81   Temp 98.3 °F (36.8 °C) (Oral)   Resp 16   Ht 6' (1.829 m)   Wt 238 lb 1.6 oz (108 kg)   SpO2 100%   BMI 32.29 kg/m²   24HR INTAKE/OUTPUT:    I/O last 3 completed shifts: In: 2039 [P.O.:300; I.V.:325; IV Piggyback:1000]  Out: -   No intake/output data recorded. CONSTITUTIONAL:  alert, no apparent distress and mildly obese  EYES:  PERRL, sclera clear  ENT:  Normocephalic,atraumatic, without obvious abnormality  NECK:  supple, symmetrical, trachea midline  LUNGS: Resp effort easy and unlabored, clear to auscultation  CARDIOVASCULAR:  NO JVD, regular rate and rhythm and no murmur noted  ABDOMEN:  normal bowel sounds, soft, non-distended, mild tenderness noted in the right upper quadrant Johnson's sign is absent, voluntary guarding absent, no masses palpated and hernia present in the epigastric area which is soft and reducible  MUSCULOSKELETAL: No clubbing or cyanosis, 0+ pitting edema lower extremities  NEUROLOGIC:  Mental Status Exam:  Level of Alertness:   awake  PSYCHIATRIC:   person, place, time  SKIN:  no bruising or bleeding, normal skin color, texture, turgor, and no redness, warmth, or swelling    DATA:    CBC:   Recent Labs     10/02/22  0827 10/02/22  1823   WBC 10.5 13.5*   HGB 15.6 15.6   HCT 45.6 45.3    227     BMP:    Recent Labs     10/02/22  0827 10/02/22  1823 10/03/22  0553   * 136 135*   K 4.2 4.2 4.2   CL 99 98* 100   CO2 22 28 26   BUN 13 11 8   CREATININE 1.0 1.1 0.9   GLUCOSE 168* 128* 125*     Hepatic:   Recent Labs     10/02/22  0827 10/02/22  1823   AST 23 26   ALT 30 34   BILITOT 0.5 0.4   ALKPHOS 82 89     Mag:    No results for input(s): MG in the last 72 hours.    Phos:   No results for input(s): PHOS in the last 72 hours. INR: No results for input(s): INR in the last 72 hours. Radiology Review: Images personally reviewed by me. CT images reviewed. Gallbladder is unremarkable      IMPRESSION/RECOMMENDATIONS:    Abdominal pain, likely cholecystitis based on HIDA scan. The CAT scan was really unremarkable for any acute inflammatory process. He is symptomatically improved already with just bowel rest.  We will start antibiotics and restarted diet.   If he does okay overnight, he can be discharged home tomorrow with plans for interval follow-up outpatient cholecystectomy    Electronically signed by MD Paola Green

## 2022-10-04 NOTE — PROGRESS NOTES
Progress Note    Patient Kristy Odom  MRN: [id number]  YOB: 1980 Age: 43 y.o. Sex: male  Room: [address] [place] [address]       Admitting Physician: Robe Ellison MD   Date of Admission: 10/2/2022  5:32 PM   Primary Care Physician: AMANDA Mancia - CNP     Subjective:  Kristy Odom was seen and examined. We are following for N/V/RUQ pain. -- N/V resolved. Still with upper abdominal pain/RUQ pain. This pain has been intermittent for the past two months. This admission was the worst. Pain is since subsided somewhat. ROS:  Constitutional: Denies fever, no change in appetite  Respiratory: Denies cough or shortness of breath  Cardiovascular: Denies chest pain or edema    Objective:  Vital Signs:   Vitals:    10/04/22 0915   BP: 136/75   Pulse: 85   Resp: 16   Temp: 98.8 °F (37.1 °C)   SpO2: 97%         Physical Exam:  Constitutional: Alert and oriented x 4. No acute distress. Respiratory: Respirations nonlabored, no crepitus  GI: Abdomen nondistended, soft, and tender. Neurological: No focal deficits noted. No asterixis.     Intake/Output:    Intake/Output Summary (Last 24 hours) at 10/4/2022 0940  Last data filed at 10/3/2022 1806  Gross per 24 hour   Intake 565 ml   Output --   Net 565 ml        Current Medications:  Current Facility-Administered Medications   Medication Dose Route Frequency Provider Last Rate Last Admin    pantoprazole (PROTONIX) injection 40 mg  40 mg IntraVENous BID Milena Juan MD   40 mg at 10/04/22 0921    sucralfate (CARAFATE) tablet 1 g  1 g Oral 4x Daily Robe Ellison MD   1 g at 10/03/22 2031    fenofibrate (TRICOR) tablet 54 mg  54 mg Oral Daily Robe Ellison MD   54 mg at 10/03/22 0954    sodium chloride flush 0.9 % injection 5-40 mL  5-40 mL IntraVENous 2 times per day Robe Ellison MD   10 mL at 10/03/22 2031    sodium chloride flush 0.9 % injection 5-40 mL  5-40 mL IntraVENous PRN Robe Ellison MD        0.9 % sodium chloride infusion   IntraVENous PRN Veronique Sultana MD        enoxaparin Sodium (LOVENOX) injection 30 mg  30 mg SubCUTAneous BID Veronique Sultana MD   30 mg at 10/04/22 0922    ondansetron (ZOFRAN-ODT) disintegrating tablet 4 mg  4 mg Oral Q8H PRN Veronique Sultana MD        Or    ondansetron [facility]) injection 4 mg  4 mg IntraVENous Q6H PRN Veronique Sultana MD        polyethylene glycol (GLYCOLAX) packet 17 g  17 g Oral Daily PRN Veronique Sultana MD        acetaminophen (TYLENOL) tablet 650 mg  650 mg Oral Q6H PRN Veronique Sultana MD   650 mg at 10/03/22 2031    Or    acetaminophen (TYLENOL) suppository 650 mg  650 mg Rectal Q6H PRN Veronique Sultana MD        0.9 % sodium chloride infusion   IntraVENous Continuous Veronique Sultana  mL/hr at 10/04/22 0208 New Bag at 10/04/22 0208    promethazine (PHENERGAN) injection 6.25 mg  6.25 mg IntraMUSCular Q6H PRN Veronique Sultana MD        ketorolac (TORADOL) injection 15 mg  15 mg IntraVENous Q6H PRN Veronique Sultana MD   15 mg at 10/04/22 0557         Recent Imaging:   CT CHEST PULMONARY EMBOLISM W CONTRAST  Narrative: EXAMINATION:  CT OF THE ABDOMEN AND PELVIS WITH CONTRAST; CTA OF THE CHEST 10/2/2022 9:51 am    TECHNIQUE:  CT of the abdomen and pelvis was performed with the administration of  intravenous contrast. Multiplanar reformatted images are provided for review. Automated exposure control, iterative reconstruction, and/or weight based  adjustment of the mA/kV was utilized to reduce the radiation dose to as low  as reasonably achievable.; CTA of the chest was performed after the  administration of intravenous contrast.  Multiplanar reformatted images are  provided for review. MIP images are provided for review. Automated exposure  control, iterative reconstruction, and/or weight based adjustment of the  mA/kV was utilized to reduce the radiation dose to as low as reasonably  achievable.     COMPARISON:  Chest CT dated 12/11/2020    HISTORY:  ORDERING SYSTEM PROVIDED HISTORY: mid epigastric/luq pain  TECHNOLOGIST PROVIDED HISTORY:  Additional Contrast?->None  Reason for exam:->mid epigastric/luq pain  Decision Support Exception - unselect if not a suspected or confirmed  emergency medical condition->Emergency Medical Condition (MA)  Reason for Exam: ABD PAIN; ORDERING SYSTEM PROVIDED HISTORY: short of breath  TECHNOLOGIST PROVIDED HISTORY:  Reason for exam:->short of breath  Decision Support Exception - unselect if not a suspected or confirmed  emergency medical condition->Emergency Medical Condition (MA)  Reason for Exam: SOB, ABD PAIN IN UPPER CENTER    FINDINGS:    Chest:    Mediastinum: Within the mediastinum, no gross intraluminal flap. Pulsation  artifact is seen at the ascending aorta. Calcified subcarinal lymph node, in  keeping with granulomatous disease. Within the mediastinum, no miriam  activity markedly greater than mediastinal background. Thyroid is symmetric  in size. No axillary adenopathy. Within the main, central most right, central most left pulmonary arteries, no  evidence of filling defect to suggest large central pulmonary embolism. Within the opacified branch vessels, no gross embolism is seen. Lungs/pleura: Ground-glass opacity bilaterally, greatest dependently,  favoring atelectasis. No pleural effusion or pneumothorax. Soft Tissues/Bones: Degenerative change of spine. Abdomen/Pelvis:    Organs: No intrahepatic ductal dilatation. Spleen is within normal limits. Gallbladder is unremarkable. Stomach is decompressed. No pancreatic ductal  dilatation or stranding. 4 mm fat density lesion within the right adrenal  gland, likely myelolipoma. Mild bilateral pelviectasis. No ureteral  calculus. Shotty right upper quadrant portal and peripancreatic lymph nodes,  similar to prior. Abdominal aorta is within normal limits in caliber.     GI/Bowel: Diverticulosis, without wanda diverticulitis. Moderate stool  within the colon. Appendix is within normal limits in caliber. Loops of  small and large bowel are nondilated. Fat containing paraumbilical hernia. Pelvis: Bladder is grossly unremarkable. No inguinal adenopathy. Fat  containing inguinal hernias bilaterally. Premalar punctate calcification  within the central prostate. Peritoneum/Retroperitoneum: No free air. Bones/Soft Tissues: Irregularity of the lateral right 12th rib. Mild spinal  degenerative change. Impression: Chest:    No findings to suggest pulmonary embolism. Age-indeterminate right 12th rib fracture. Abdomen pelvis:    3 mm calcification within the central prostate which could reflect prostate  calcification or small calculus within the prostatic urethra. Mild bilateral  pelviectasis. Suggest correlation with urinalysis. Otherwise, no gross  acute intra-abdominal process. Fat containing paraumbilical hernia and fat containing inguinal hernias. CT ABDOMEN PELVIS W IV CONTRAST Additional Contrast? None  Narrative: EXAMINATION:  CT OF THE ABDOMEN AND PELVIS WITH CONTRAST; CTA OF THE CHEST 10/2/2022 9:51 am    TECHNIQUE:  CT of the abdomen and pelvis was performed with the administration of  intravenous contrast. Multiplanar reformatted images are provided for review. Automated exposure control, iterative reconstruction, and/or weight based  adjustment of the mA/kV was utilized to reduce the radiation dose to as low  as reasonably achievable.; CTA of the chest was performed after the  administration of intravenous contrast.  Multiplanar reformatted images are  provided for review. MIP images are provided for review. Automated exposure  control, iterative reconstruction, and/or weight based adjustment of the  mA/kV was utilized to reduce the radiation dose to as low as reasonably  achievable.     COMPARISON:  Chest CT dated 12/11/2020    HISTORY:  ORDERING SYSTEM PROVIDED HISTORY: [facility] epigastric/luq pain  TECHNOLOGIST PROVIDED HISTORY:  Additional Contrast?->None  Reason for exam:->mid epigastric/luq pain  Decision Support Exception - unselect if not a suspected or confirmed  emergency medical condition->Emergency Medical Condition (MA)  Reason for Exam: ABD PAIN; ORDERING SYSTEM PROVIDED HISTORY: short of breath  TECHNOLOGIST PROVIDED HISTORY:  Reason for exam:->short of breath  Decision Support Exception - unselect if not a suspected or confirmed  emergency medical condition->Emergency Medical Condition (MA)  Reason for Exam: SOB, ABD PAIN IN UPPER CENTER    FINDINGS:    Chest:    Mediastinum: Within the mediastinum, no gross intraluminal flap. Pulsation  artifact is seen at the ascending aorta. Calcified subcarinal lymph node, in  keeping with granulomatous disease. Within the mediastinum, no miriam  activity markedly greater than mediastinal background. Thyroid is symmetric  in size. No axillary adenopathy. Within the main, central most right, central most left pulmonary arteries, no  evidence of filling defect to suggest large central pulmonary embolism. Within the opacified branch vessels, no gross embolism is seen. Lungs/pleura: Ground-glass opacity bilaterally, greatest dependently,  favoring atelectasis. No pleural effusion or pneumothorax. Soft Tissues/Bones: Degenerative change of spine. Abdomen/Pelvis:    Organs: No intrahepatic ductal dilatation. Spleen is within normal limits. Gallbladder is unremarkable. Stomach is decompressed. No pancreatic ductal  dilatation or stranding. 4 mm fat density lesion within the right adrenal  gland, likely myelolipoma. Mild bilateral pelviectasis. No ureteral  calculus. Shotty right upper quadrant portal and peripancreatic lymph nodes,  similar to prior. Abdominal aorta is within normal limits in caliber. GI/Bowel: Diverticulosis, without wanda diverticulitis. Moderate stool  within the colon.   Appendix is within normal limits in caliber. Loops of  small and large bowel are nondilated. Fat containing paraumbilical hernia. Pelvis: Bladder is grossly unremarkable. No inguinal adenopathy. Fat  containing inguinal hernias bilaterally. Premalar punctate calcification  within the central prostate. Peritoneum/Retroperitoneum: No free air. Bones/Soft Tissues: Irregularity of the lateral right 12th rib. Mild spinal  degenerative change. Impression: Chest:    No findings to suggest pulmonary embolism. Age-indeterminate right 12th rib fracture. Abdomen pelvis:    3 mm calcification within the central prostate which could reflect prostate  calcification or small calculus within the prostatic urethra. Mild bilateral  pelviectasis. Suggest correlation with urinalysis. Otherwise, no gross  acute intra-abdominal process. Fat containing paraumbilical hernia and fat containing inguinal hernias. XR CHEST PORTABLE  Narrative: EXAMINATION:  ONE XRAY VIEW OF THE CHEST    10/2/2022 8:52 am    COMPARISON:  11/19/2020    HISTORY:  ORDERING SYSTEM PROVIDED HISTORY: abd pain, short of breath  TECHNOLOGIST PROVIDED HISTORY:  Reason for exam:->abd pain, short of breath  Reason for Exam: abd pain, SOB    FINDINGS:  Heterogeneous left basilar pulmonary opacity is seen, in configuration  slightly different than prior. A 1.5 cm nodular component is seen laterally. No significant pleural effusion. No pneumothorax. Cardiac and mediastinal  silhouettes are similar to prior. Impression: Left basilar opacity is seen, for which some considerations include  pneumonia, atelectasis, or scar. An underlying pulmonary nodule cannot be  excluded. If this does not resolve, chest CT would be suggested for further  characterization.        Labs:   Recent Labs     10/02/22  0827 10/02/22  1823   HGB 15.6 15.6   WBC 10.5 13.5*   PROT 7.5 7.6   LABALBU 4.2 4.5   ALKPHOS 82 89   ALT 30 34   AST 23 26   BILITOT 0.5 0.4          Assessment:    42 year old male with history of alcohol abuse, hyperlipidemia, depression, . Presents with Nausea and upper abdominal pain. CT/A/P: fat containing paraumbilical hernia and fat containing inguinal hernia. EGD 10/3: normal    Plan:  Pending HIDA scan   Consider colonoscopy pending results of HIDA  Supportive care      Justo Kindred, [place]    [id number]  Also available via [employer]

## 2022-10-04 NOTE — PROGRESS NOTES
Pharmacy Note - Extended Infusion Beta-Lactam Adjustment    Piperacillin/Tazobactam 3375 mg every 8 hours ordered for treatment of intra-abdominal infection. Per [facility] Extended Infusion Beta-Lactam Policy, added 8167 mg x 1 loading dose. Estimated Creatinine Clearance: Estimated Creatinine Clearance: 136 mL/min (based on SCr of 0.9 mg/dL). Dialysis Status, POLINA, CKD:      BMI: Body mass index is 32.29 kg/m². Rationale for Adjustment: Agent demonstrates time-dependent effect on bacterial eradication. Extended-infusion dosing strategy aims to enhance microbiologic and clinical efficacy. Pharmacy will continue to monitor cultures and sensitivities (where available) and adjust dose as necessary. Please call with any questions.     Thank you,  Dunia Nava, [facility]

## 2022-10-05 LAB
A/G RATIO: 1.6 (ref 1.1–2.2)
ALBUMIN SERPL-MCNC: 4.2 G/DL (ref 3.4–5)
ALP BLD-CCNC: 71 U/L (ref 40–129)
ALT SERPL-CCNC: 23 U/L (ref 10–40)
ANION GAP SERPL CALCULATED.3IONS-SCNC: 7 MMOL/L (ref 3–16)
AST SERPL-CCNC: 19 U/L (ref 15–37)
BASOPHILS ABSOLUTE: 0.1 K/UL (ref 0–0.2)
BASOPHILS RELATIVE PERCENT: 1.1 %
BILIRUB SERPL-MCNC: 0.6 MG/DL (ref 0–1)
BUN BLDV-MCNC: 8 MG/DL (ref 7–20)
CALCIUM SERPL-MCNC: 9.1 MG/DL (ref 8.3–10.6)
CHLORIDE BLD-SCNC: 103 MMOL/L (ref 99–110)
CO2: 30 MMOL/L (ref 21–32)
CREAT SERPL-MCNC: 1.2 MG/DL (ref 0.9–1.3)
EOSINOPHILS ABSOLUTE: 0.6 K/UL (ref 0–0.6)
EOSINOPHILS RELATIVE PERCENT: 6.8 %
GFR AFRICAN AMERICAN: >60
GFR NON-AFRICAN AMERICAN: >60
GLUCOSE BLD-MCNC: 114 MG/DL (ref 70–99)
HCT VFR BLD CALC: 40.2 % (ref 40.5–52.5)
HEMOGLOBIN: 13.7 G/DL (ref 13.5–17.5)
LYMPHOCYTES ABSOLUTE: 2.2 K/UL (ref 1–5.1)
LYMPHOCYTES RELATIVE PERCENT: 23.9 %
MCH RBC QN AUTO: 28.7 PG (ref 26–34)
MCHC RBC AUTO-ENTMCNC: 34 G/DL (ref 31–36)
MCV RBC AUTO: 84.3 FL (ref 80–100)
MONOCYTES ABSOLUTE: 0.8 K/UL (ref 0–1.3)
MONOCYTES RELATIVE PERCENT: 8.7 %
NEUTROPHILS ABSOLUTE: 5.5 K/UL (ref 1.7–7.7)
NEUTROPHILS RELATIVE PERCENT: 59.5 %
PDW BLD-RTO: 13.7 % (ref 12.4–15.4)
PLATELET # BLD: 203 K/UL (ref 135–450)
PMV BLD AUTO: 8.5 FL (ref 5–10.5)
POTASSIUM SERPL-SCNC: 4.4 MMOL/L (ref 3.5–5.1)
RBC # BLD: 4.77 M/UL (ref 4.2–5.9)
SODIUM BLD-SCNC: 140 MMOL/L (ref 136–145)
TOTAL PROTEIN: 6.8 G/DL (ref 6.4–8.2)
WBC # BLD: 9.2 K/UL (ref 4–11)

## 2022-10-05 PROCEDURE — 1200000000 HC SEMI PRIVATE

## 2022-10-05 PROCEDURE — 96376 TX/PRO/DX INJ SAME DRUG ADON: CPT

## 2022-10-05 PROCEDURE — 96366 THER/PROPH/DIAG IV INF ADDON: CPT

## 2022-10-05 PROCEDURE — 80053 COMPREHEN METABOLIC PANEL: CPT

## 2022-10-05 PROCEDURE — 99232 SBSQ HOSP IP/OBS MODERATE 35: CPT | Performed by: SURGERY

## 2022-10-05 PROCEDURE — 99233 SBSQ HOSP IP/OBS HIGH 50: CPT | Performed by: INTERNAL MEDICINE

## 2022-10-05 PROCEDURE — G0378 HOSPITAL OBSERVATION PER HR: HCPCS

## 2022-10-05 PROCEDURE — 6360000002 HC RX W HCPCS: Performed by: INTERNAL MEDICINE

## 2022-10-05 PROCEDURE — 6360000002 HC RX W HCPCS: Performed by: SURGERY

## 2022-10-05 PROCEDURE — C9113 INJ PANTOPRAZOLE SODIUM, VIA: HCPCS | Performed by: INTERNAL MEDICINE

## 2022-10-05 PROCEDURE — 6370000000 HC RX 637 (ALT 250 FOR IP): Performed by: INTERNAL MEDICINE

## 2022-10-05 PROCEDURE — 96372 THER/PROPH/DIAG INJ SC/IM: CPT

## 2022-10-05 PROCEDURE — 36415 COLL VENOUS BLD VENIPUNCTURE: CPT

## 2022-10-05 PROCEDURE — 85025 COMPLETE CBC W/AUTO DIFF WBC: CPT

## 2022-10-05 PROCEDURE — 2580000003 HC RX 258: Performed by: INTERNAL MEDICINE

## 2022-10-05 PROCEDURE — 2580000003 HC RX 258: Performed by: SURGERY

## 2022-10-05 RX ADMIN — SUCRALFATE 1 G: 1 TABLET ORAL at 13:34

## 2022-10-05 RX ADMIN — PANTOPRAZOLE SODIUM 40 MG: 40 INJECTION, POWDER, FOR SOLUTION INTRAVENOUS at 22:31

## 2022-10-05 RX ADMIN — PIPERACILLIN AND TAZOBACTAM 3375 MG: 3; .375 INJECTION, POWDER, FOR SOLUTION INTRAVENOUS at 06:29

## 2022-10-05 RX ADMIN — ONDANSETRON HYDROCHLORIDE 4 MG: 2 INJECTION, SOLUTION INTRAMUSCULAR; INTRAVENOUS at 06:30

## 2022-10-05 RX ADMIN — SODIUM CHLORIDE: 9 INJECTION, SOLUTION INTRAVENOUS at 06:27

## 2022-10-05 RX ADMIN — KETOROLAC TROMETHAMINE 15 MG: 30 INJECTION, SOLUTION INTRAMUSCULAR; INTRAVENOUS at 22:38

## 2022-10-05 RX ADMIN — Medication 10 ML: at 22:31

## 2022-10-05 RX ADMIN — ENOXAPARIN SODIUM 30 MG: 100 INJECTION SUBCUTANEOUS at 22:31

## 2022-10-05 RX ADMIN — SUCRALFATE 1 G: 1 TABLET ORAL at 10:00

## 2022-10-05 RX ADMIN — SODIUM CHLORIDE: 9 INJECTION, SOLUTION INTRAVENOUS at 16:12

## 2022-10-05 RX ADMIN — PIPERACILLIN AND TAZOBACTAM 3375 MG: 3; .375 INJECTION, POWDER, FOR SOLUTION INTRAVENOUS at 22:33

## 2022-10-05 RX ADMIN — PANTOPRAZOLE SODIUM 40 MG: 40 INJECTION, POWDER, FOR SOLUTION INTRAVENOUS at 10:00

## 2022-10-05 RX ADMIN — ONDANSETRON HYDROCHLORIDE 4 MG: 2 INJECTION, SOLUTION INTRAMUSCULAR; INTRAVENOUS at 22:38

## 2022-10-05 RX ADMIN — FENOFIBRATE 54 MG: 54 TABLET ORAL at 10:00

## 2022-10-05 RX ADMIN — SUCRALFATE 1 G: 1 TABLET ORAL at 22:31

## 2022-10-05 RX ADMIN — PIPERACILLIN AND TAZOBACTAM 3375 MG: 3; .375 INJECTION, POWDER, FOR SOLUTION INTRAVENOUS at 16:13

## 2022-10-05 RX ADMIN — SUCRALFATE 1 G: 1 TABLET ORAL at 16:13

## 2022-10-05 ASSESSMENT — PAIN - FUNCTIONAL ASSESSMENT: PAIN_FUNCTIONAL_ASSESSMENT: PREVENTS OR INTERFERES SOME ACTIVE ACTIVITIES AND ADLS

## 2022-10-05 ASSESSMENT — PAIN DESCRIPTION - PAIN TYPE: TYPE: ACUTE PAIN

## 2022-10-05 ASSESSMENT — PAIN DESCRIPTION - ORIENTATION: ORIENTATION: RIGHT;UPPER

## 2022-10-05 ASSESSMENT — PAIN DESCRIPTION - LOCATION: LOCATION: ABDOMEN

## 2022-10-05 ASSESSMENT — PAIN SCALES - GENERAL: PAINLEVEL_OUTOF10: 5

## 2022-10-05 ASSESSMENT — PAIN DESCRIPTION - DESCRIPTORS: DESCRIPTORS: SORE

## 2022-10-05 NOTE — PROGRESS NOTES
General Surgery Daily Progress Note    Pt Name: Kulwinder Tracey  Medical Record Number: [id number]  Date of Birth 1980   Today's Date: 10/5/2022    ASSESSMENT  HIDA scan: non-visualization of the gallbladder through 4 hours ? For cholecystitis   CT abd and pelvis: normal gallbladder, fat containing umbilical hernia, fat containing inguinal hernias  ABD; soft, + severe RUQ tenderness with guarding, +N, no V, + gold/yellow diarrhea  Leuks normal  LFTs normal  VSS  Pt states \"I don't know if feel better or not. \"    PLAN  Zosyn  IVF  Pain and nausea control  NPO after midnight: Will tentatively add pt on in the am for robotic cholecystectomy with Dr. Jay Ruff. Pt and his girlfriend are agree able with the plan of care. Renee Briscoe is unchanged from yesterday. Pain is not well controlled. He has nausea and no vomiting. He has passed flatus and has had a bowel movement. He is somewhat tolerating full liquids. Current activity is up with assistance    OBJECTIVE  VITALS:  height is 6' (1.829 m) and weight is 238 lb 1.6 oz (108 kg). His temperature is 97.9 °F (36.6 °C). His blood pressure is 120/71 and his pulse is 82. His respiration is 22 and oxygen saturation is 99%. VITALS:  /71   Pulse 82   Temp 97.9 °F (36.6 °C) (Oral)   Resp 22   Ht 6' (1.829 m)   Wt 238 lb 1.6 oz (108 kg)   SpO2 99%   BMI 32.29 kg/m²   INTAKE/OUTPUT:  No intake or output data in the 24 hours ending 10/05/22 1523  URINARY CATHETER OUTPUT ([place]):     GENERAL: alert, cooperative, no distress    No intake/output data recorded. No intake/output data recorded.     LABS  Recent Labs     10/05/22  1237   WBC 9.2   HGB 13.7   HCT 40.2*         K 4.4      CO2 30   BUN 8   CREATININE 1.2   CALCIUM 9.1   AST 19   ALT 23   BILITOT 0.6   CBC with Differential:    Lab Results   Component Value Date/Time    WBC 9.2 10/05/2022 12:37 PM    RBC 4.77 10/05/2022 12:37 PM    HGB 13.7 10/05/2022 12:37 PM    HCT 40.2 10/05/2022 12:37 PM     10/05/2022 12:37 PM    MCV 84.3 10/05/2022 12:37 PM    MCH 28.7 10/05/2022 12:37 PM    MCHC 34.0 10/05/2022 12:37 PM    RDW 13.7 10/05/2022 12:37 PM    LYMPHOPCT 23.9 10/05/2022 12:37 PM    MONOPCT 8.7 10/05/2022 12:37 PM    BASOPCT 1.1 10/05/2022 12:37 PM    MONOSABS 0.8 10/05/2022 12:37 PM    LYMPHSABS 2.2 10/05/2022 12:37 PM    EOSABS 0.6 10/05/2022 12:37 PM    BASOSABS 0.1 10/05/2022 12:37 PM     CMP:    Lab Results   Component Value Date/Time     10/05/2022 12:37 PM    K 4.4 10/05/2022 12:37 PM    K 4.2 10/03/2022 05:53 AM     10/05/2022 12:37 PM    CO2 30 10/05/2022 12:37 PM    BUN 8 10/05/2022 12:37 PM    CREATININE 1.2 10/05/2022 12:37 PM    GFRAA >60 10/05/2022 12:37 PM    AGRATIO 1.6 10/05/2022 12:37 PM    LABGLOM >60 10/05/2022 12:37 PM    GLUCOSE 114 10/05/2022 12:37 PM    PROT 6.8 10/05/2022 12:37 PM    LABALBU 4.2 10/05/2022 12:37 PM    CALCIUM 9.1 10/05/2022 12:37 PM    BILITOT 0.6 10/05/2022 12:37 PM    ALKPHOS 71 10/05/2022 12:37 PM    AST 19 10/05/2022 12:37 PM    ALT 23 10/05/2022 12:37 PM         AMANDA Bright CNP  Electronically signed 10/5/2022 at 3:06 PM     Patient seen and examined. I agree with the assessment and plan from REBECCA Webb. More than half of the time spent on this encounter was completed by me including the history, physical examination and the entire medical decision making. Patient had recurrent pain with PO intake. Tender RUQ. Labs reviewed. HIDA with Gb nonvisualization. The diagnosis and recommended procedure were explained. Questions answered. Prepare for gallbladder surgery with Dr. Charley Snow tomorrow given refractory symptoms.     Claudia Mai MD

## 2022-10-05 NOTE — PLAN OF CARE
Problem: Discharge Planning  Goal: Discharge to home or other facility with appropriate resources  10/5/2022 0417 by Phillip Mistry RN  Outcome: Progressing  Flowsheets (Taken 10/4/2022 2300)  Discharge to home or other facility with appropriate resources: Identify barriers to discharge with patient and caregiver  10/4/2022 1942 by Sana Jacinto RN  Outcome: Progressing  Flowsheets (Taken 10/4/2022 0915)  Discharge to home or other facility with appropriate resources: Identify barriers to discharge with patient and caregiver

## 2022-10-05 NOTE — PROGRESS NOTES
Hospitalist Progress Note      PCP: AMANDA Masterson - CNP    Date of Admission: 10/2/2022    Chief Complaint: abd pain. Subjective:       Pt had few bouts of nause and emesis last night. ABd pain and tenderness without much improvement. He is guarding and tender when R hemiabdomen palpated. Medications:  Reviewed    Infusion Medications    sodium chloride      sodium chloride 100 mL/hr at 10/05/22 6321     Scheduled Medications    piperacillin-tazobactam  3,375 mg IntraVENous Q8H    pantoprazole  40 mg IntraVENous BID    sucralfate  1 g Oral 4x Daily    fenofibrate  54 mg Oral Daily    sodium chloride flush  5-40 mL IntraVENous 2 times per day    enoxaparin  30 mg SubCUTAneous BID     PRN Meds: sodium chloride flush, sodium chloride, ondansetron **OR** ondansetron, polyethylene glycol, acetaminophen **OR** acetaminophen, promethazine, ketorolac    No intake or output data in the 24 hours ending 10/05/22 1245      Physical Exam Performed:    /71   Pulse 82   Temp 97.9 °F (36.6 °C)   Resp 22   Ht 6' (1.829 m)   Wt 238 lb 1.6 oz (108 kg)   SpO2 99%   BMI 32.29 kg/m²     General appearance: No apparent distress, appears stated age and cooperative. HEENT: Pupils equal, round, and reactive to light. Conjunctivae/corneas clear. Neck: Supple, with full range of motion. No jugular venous distention. Trachea midline. Respiratory:  Normal respiratory effort. Clear to auscultation, bilaterally without Rales/Wheezes/Rhonchi. Cardiovascular: Regular rate and rhythm with normal S1/S2 without murmurs, rubs or gallops. Abdomen: Soft, non-tender, non-distended with normal bowel sounds. Musculoskeletal: No clubbing, cyanosis or edema bilaterally. Full range of motion without deformity. Skin: Skin color, texture, turgor normal.  No rashes or lesions. Neurologic:  Neurovascularly intact without any focal sensory/motor deficits.  Cranial nerves: II-XII intact, grossly non-focal.  Psychiatric: Alert and oriented, thought content appropriate, normal insight  Capillary Refill: Brisk, 3 seconds, normal   Peripheral Pulses: +2 palpable, equal bilaterally       Labs:   Recent Labs     10/02/22  1823 10/05/22  1237   WBC 13.5* 9.2   HGB 15.6 13.7   HCT 45.3 40.2*    203       Recent Labs     10/02/22  1823 10/03/22  0553    135*   K 4.2 4.2   CL 98* 100   CO2 28 26   BUN 11 8   CREATININE 1.1 0.9   CALCIUM 9.0 8.7       Recent Labs     10/02/22  1823   AST 26   ALT 34   BILITOT 0.4   ALKPHOS 89       No results for input(s): INR in the last 72 hours. Recent Labs     10/02/22  1823   TROPONINI <0.01         Urinalysis:      Lab Results   Component Value Date/Time    NITRU Negative 10/02/2022 08:27 AM    WBCUA 0-2 10/02/2022 08:27 AM    RBCUA None seen 10/02/2022 08:27 AM    BLOODU Negative 10/02/2022 08:27 AM    SPECGRAV <=1.005 10/02/2022 08:27 AM    GLUCOSEU Negative 10/02/2022 08:27 AM       Radiology:  Jeni Laureano   Final Result   Gallbladder is not definitively visualized through 4 hours, which can reflect   cholecystitis in the appropriate clinical setting. RECOMMENDATIONS:   Unavailable                 Assessment/Plan:    Active Hospital Problems    Diagnosis     Cholecystitis [K81.9]      Priority: Medium    Intractable nausea and vomiting [R11.2]      Priority: Medium       Abd Pain. PUD ruled out  Strong suspect severely symptomatic Biliary Colic and cholecystitis. Persisting pain and tender on exam  GI involved. - EGD - unremarkable              - HIDA scan - gall bladder non visualized. - IVF support. - ok for PRN toradol              - avoid IV narcotics. - cont PPI. - surgery team involved. - started Iv Abx.     - await further surgery input. DVT Prophylaxis: lovenox  Diet: ADULT DIET; Clear Liquid  Code Status: Full Code        Dispo - Med surg.           Dorcas Rubio MD

## 2022-10-05 NOTE — PROGRESS NOTES
Progress Note    Patient Wiliam Nichols  MRN: [id number]  YOB: 1980 Age: 43 y.o. Sex: male  Room: [address] [place] [address]       Admitting Physician: Irene Rivera MD   Date of Admission: 10/2/2022  5:32 PM   Primary Care Physician: AMANDA Shrestha - CNP     Subjective:  Wiliam Nichols was seen and examined. We are following for RUQ pain. -- still with abdominal pain. Some nausea as well    ROS:  Constitutional: Denies fever, no change in appetite  Respiratory: Denies cough or shortness of breath  Cardiovascular: Denies chest pain or edema    Objective:  Vital Signs:   Vitals:    10/05/22 0945   BP: 120/71   Pulse: 82   Resp: 22   Temp: 97.9 °F (36.6 °C)   SpO2: 99%         Physical Exam:  Constitutional: Alert and oriented x 4. No acute distress. Respiratory: Respirations nonlabored, no crepitus  GI: Abdomen nondistended, soft, and tender. Neurological: No focal deficits noted. No asterixis.     Intake/Output:  No intake or output data in the 24 hours ending 10/05/22 1044     Current Medications:  Current Facility-Administered Medications   Medication Dose Route Frequency Provider Last Rate Last Admin    piperacillin-tazobactam (ZOSYN) 3,375 mg in sodium chloride 0.9 % 100 mL IVPB extended infusion (mini-bag)  3,375 mg IntraVENous Q8H Jackelyn Mendez MD 25 mL/hr at 10/05/22 0629 3,375 mg at 10/05/22 0629    pantoprazole (PROTONIX) injection 40 mg  40 mg IntraVENous BID Chris Hummel MD   40 mg at 10/05/22 1000    sucralfate (CARAFATE) tablet 1 g  1 g Oral 4x Daily Irene Rivera MD   1 g at 10/05/22 1000    fenofibrate (TRICOR) tablet 54 mg  54 mg Oral Daily Irene Rivera MD   54 mg at 10/05/22 1000    sodium chloride flush 0.9 % injection 5-40 mL  5-40 mL IntraVENous 2 times per day Irene Rivera MD   10 mL at 10/04/22 2314    sodium chloride flush 0.9 % injection 5-40 mL  5-40 mL IntraVENous PRN Irene Rivera MD        0.9 % sodium chloride infusion   IntraVENous PRN Kika Yang MD        enoxaparin Sodium (LOVENOX) injection 30 mg  30 mg SubCUTAneous BID Kika Yang MD   30 mg at 10/04/22 2314    ondansetron (ZOFRAN-ODT) disintegrating tablet 4 mg  4 mg Oral Q8H PRN Kika Yang MD        Or    ondansetron [facility]) injection 4 mg  4 mg IntraVENous Q6H PRN Kika Yang MD   4 mg at 10/05/22 0630    polyethylene glycol (GLYCOLAX) packet 17 g  17 g Oral Daily PRN Kika Yang MD        acetaminophen (TYLENOL) tablet 650 mg  650 mg Oral Q6H PRN Kika Yang MD   650 mg at 10/03/22 2031    Or    acetaminophen (TYLENOL) suppository 650 mg  650 mg Rectal Q6H PRN Kika Yang MD        0.9 % sodium chloride infusion   IntraVENous Continuous Kika Yang  mL/hr at 10/05/22 7967 New Bag at 10/05/22 3542    promethazine (PHENERGAN) injection 6.25 mg  6.25 mg IntraMUSCular Q6H PRN Kika Yang MD        ketorolac (TORADOL) injection 15 mg  15 mg IntraVENous Q6H PRN Kika Yang MD   15 mg at 10/04/22 2009         Recent Imaging:   NM HEPATOBILIARY  Narrative: EXAMINATION:  NUCLEAR MEDICINE HEPATOBILIARY SCINTIGRAPHY (HIDA SCAN). 10/4/2022 10:40 am    TECHNIQUE:  Approximately 6.2 mCi Tc-99m Mebrofenin (Choletec) was administered IV. Then, dynamic images of the abdomen were obtained in the anterior projection  for through 4 hours. COMPARISON:  CT abdomen pelvis dated 10/02/2022    HISTORY:  ORDERING SYSTEM PROVIDED HISTORY: ABdominal pain  TECHNOLOGIST PROVIDED HISTORY:  Reason for exam:->ABdominal pain    FINDINGS:  Upon injection of radiopharmaceutical, there is prompt visualization of the  liver. Activity is seen within common duct and small bowel by 20 minutes. There is progressive washout of activity from the liver.   Faint activity is  seen which is relatively amorphous projecting at the inferior right hepatic  lobe during the 1st and 2nd hour of imaging though clears by the 4 hour  image, favoring hepatic activity. Gallbladder is not seen at 4 hours. Impression: Gallbladder is not definitively visualized through 4 hours, which can reflect  cholecystitis in the appropriate clinical setting. RECOMMENDATIONS:  Unavailable       Labs:   Recent Labs     10/02/22  1823   HGB 15.6   WBC 13.5*   PROT 7.6   LABALBU 4.5   ALKPHOS 89   ALT 34   AST 26   BILITOT 0.4          Assessment:    43year old male with history of alcohol abuse, hyperlipidemia, depression, . Presents with Nausea and upper abdominal pain. CT/A/P: fat containing paraumbilical hernia and fat containing inguinal hernia. EGD 10/3: normal  HIDA: gallbladder is not definitively visualized through 4 hours    Plan:  Appreciate surgical input. Recommend high fiber diet/fiber supplement and miralax as needed for constipation  No further GI plans at this time and will be available as needed      Emiliano Guardado, [employer]    [phone].  Also available via [employer]

## 2022-10-05 NOTE — PROGRESS NOTES
Shift assessment complete. Alert and oriented. Patient reports light headache. Patient denies any other needs at this time. Bed in lowest position. Side rails up x2. Call light in reach.

## 2022-10-06 ENCOUNTER — ANESTHESIA (OUTPATIENT)
Dept: OPERATING ROOM | Age: 42
DRG: 419 | End: 2022-10-06
Payer: COMMERCIAL

## 2022-10-06 ENCOUNTER — ANESTHESIA EVENT (OUTPATIENT)
Dept: OPERATING ROOM | Age: 42
DRG: 419 | End: 2022-10-06
Payer: COMMERCIAL

## 2022-10-06 LAB
GLUCOSE BLD-MCNC: 101 MG/DL (ref 70–99)
PERFORMED ON: ABNORMAL

## 2022-10-06 PROCEDURE — 0FT44ZZ RESECTION OF GALLBLADDER, PERCUTANEOUS ENDOSCOPIC APPROACH: ICD-10-PCS | Performed by: SURGERY

## 2022-10-06 PROCEDURE — 6370000000 HC RX 637 (ALT 250 FOR IP): Performed by: SURGERY

## 2022-10-06 PROCEDURE — S2900 ROBOTIC SURGICAL SYSTEM: HCPCS | Performed by: SURGERY

## 2022-10-06 PROCEDURE — 2500000003 HC RX 250 WO HCPCS: Performed by: NURSE ANESTHETIST, CERTIFIED REGISTERED

## 2022-10-06 PROCEDURE — 3600000009 HC SURGERY ROBOT BASE: Performed by: SURGERY

## 2022-10-06 PROCEDURE — 6360000002 HC RX W HCPCS: Performed by: SURGERY

## 2022-10-06 PROCEDURE — 2709999900 HC NON-CHARGEABLE SUPPLY: Performed by: SURGERY

## 2022-10-06 PROCEDURE — 3700000000 HC ANESTHESIA ATTENDED CARE: Performed by: SURGERY

## 2022-10-06 PROCEDURE — 2580000003 HC RX 258: Performed by: SURGERY

## 2022-10-06 PROCEDURE — A4217 STERILE WATER/SALINE, 500 ML: HCPCS | Performed by: SURGERY

## 2022-10-06 PROCEDURE — 47562 LAPAROSCOPIC CHOLECYSTECTOMY: CPT | Performed by: SURGERY

## 2022-10-06 PROCEDURE — 6360000002 HC RX W HCPCS: Performed by: ANESTHESIOLOGY

## 2022-10-06 PROCEDURE — 2500000003 HC RX 250 WO HCPCS: Performed by: SURGERY

## 2022-10-06 PROCEDURE — 2580000003 HC RX 258: Performed by: NURSE ANESTHETIST, CERTIFIED REGISTERED

## 2022-10-06 PROCEDURE — 6360000002 HC RX W HCPCS: Performed by: INTERNAL MEDICINE

## 2022-10-06 PROCEDURE — 3700000001 HC ADD 15 MINUTES (ANESTHESIA): Performed by: SURGERY

## 2022-10-06 PROCEDURE — 6360000002 HC RX W HCPCS: Performed by: NURSE ANESTHETIST, CERTIFIED REGISTERED

## 2022-10-06 PROCEDURE — G0378 HOSPITAL OBSERVATION PER HR: HCPCS

## 2022-10-06 PROCEDURE — 7100000001 HC PACU RECOVERY - ADDTL 15 MIN: Performed by: SURGERY

## 2022-10-06 PROCEDURE — C9113 INJ PANTOPRAZOLE SODIUM, VIA: HCPCS | Performed by: SURGERY

## 2022-10-06 PROCEDURE — 7100000000 HC PACU RECOVERY - FIRST 15 MIN: Performed by: SURGERY

## 2022-10-06 PROCEDURE — 99232 SBSQ HOSP IP/OBS MODERATE 35: CPT | Performed by: INTERNAL MEDICINE

## 2022-10-06 PROCEDURE — 3600000019 HC SURGERY ROBOT ADDTL 15MIN: Performed by: SURGERY

## 2022-10-06 PROCEDURE — 88304 TISSUE EXAM BY PATHOLOGIST: CPT

## 2022-10-06 PROCEDURE — 1200000000 HC SEMI PRIVATE

## 2022-10-06 PROCEDURE — 96372 THER/PROPH/DIAG INJ SC/IM: CPT

## 2022-10-06 PROCEDURE — 8E0W4CZ ROBOTIC ASSISTED PROCEDURE OF TRUNK REGION, PERCUTANEOUS ENDOSCOPIC APPROACH: ICD-10-PCS | Performed by: SURGERY

## 2022-10-06 RX ORDER — INDOCYANINE GREEN AND WATER 25 MG
5 KIT INJECTION ONCE
Status: COMPLETED | OUTPATIENT
Start: 2022-10-06 | End: 2022-10-06

## 2022-10-06 RX ORDER — SODIUM CHLORIDE, SODIUM LACTATE, POTASSIUM CHLORIDE, CALCIUM CHLORIDE 600; 310; 30; 20 MG/100ML; MG/100ML; MG/100ML; MG/100ML
INJECTION, SOLUTION INTRAVENOUS CONTINUOUS PRN
Status: DISCONTINUED | OUTPATIENT
Start: 2022-10-06 | End: 2022-10-06 | Stop reason: SDUPTHER

## 2022-10-06 RX ORDER — ROCURONIUM BROMIDE 10 MG/ML
INJECTION, SOLUTION INTRAVENOUS PRN
Status: DISCONTINUED | OUTPATIENT
Start: 2022-10-06 | End: 2022-10-06 | Stop reason: SDUPTHER

## 2022-10-06 RX ORDER — DIPHENHYDRAMINE HYDROCHLORIDE 50 MG/ML
12.5 INJECTION INTRAMUSCULAR; INTRAVENOUS
Status: DISCONTINUED | OUTPATIENT
Start: 2022-10-06 | End: 2022-10-06 | Stop reason: HOSPADM

## 2022-10-06 RX ORDER — DEXAMETHASONE SODIUM PHOSPHATE 4 MG/ML
INJECTION, SOLUTION INTRA-ARTICULAR; INTRALESIONAL; INTRAMUSCULAR; INTRAVENOUS; SOFT TISSUE PRN
Status: DISCONTINUED | OUTPATIENT
Start: 2022-10-06 | End: 2022-10-06 | Stop reason: SDUPTHER

## 2022-10-06 RX ORDER — FENTANYL CITRATE 50 UG/ML
INJECTION, SOLUTION INTRAMUSCULAR; INTRAVENOUS PRN
Status: DISCONTINUED | OUTPATIENT
Start: 2022-10-06 | End: 2022-10-06 | Stop reason: SDUPTHER

## 2022-10-06 RX ORDER — LIDOCAINE HYDROCHLORIDE 20 MG/ML
INJECTION, SOLUTION INFILTRATION; PERINEURAL PRN
Status: DISCONTINUED | OUTPATIENT
Start: 2022-10-06 | End: 2022-10-06 | Stop reason: SDUPTHER

## 2022-10-06 RX ORDER — LABETALOL HYDROCHLORIDE 5 MG/ML
5 INJECTION, SOLUTION INTRAVENOUS EVERY 10 MIN PRN
Status: DISCONTINUED | OUTPATIENT
Start: 2022-10-06 | End: 2022-10-06 | Stop reason: HOSPADM

## 2022-10-06 RX ORDER — PROPOFOL 10 MG/ML
INJECTION, EMULSION INTRAVENOUS PRN
Status: DISCONTINUED | OUTPATIENT
Start: 2022-10-06 | End: 2022-10-06 | Stop reason: SDUPTHER

## 2022-10-06 RX ORDER — OXYCODONE HYDROCHLORIDE AND ACETAMINOPHEN 5; 325 MG/1; MG/1
2 TABLET ORAL EVERY 4 HOURS PRN
Status: DISCONTINUED | OUTPATIENT
Start: 2022-10-06 | End: 2022-10-07 | Stop reason: HOSPADM

## 2022-10-06 RX ORDER — BUPIVACAINE HYDROCHLORIDE 5 MG/ML
INJECTION, SOLUTION EPIDURAL; INTRACAUDAL PRN
Status: DISCONTINUED | OUTPATIENT
Start: 2022-10-06 | End: 2022-10-06 | Stop reason: ALTCHOICE

## 2022-10-06 RX ORDER — SODIUM CHLORIDE 0.9 % (FLUSH) 0.9 %
5-40 SYRINGE (ML) INJECTION EVERY 12 HOURS SCHEDULED
Status: DISCONTINUED | OUTPATIENT
Start: 2022-10-06 | End: 2022-10-06 | Stop reason: HOSPADM

## 2022-10-06 RX ORDER — ONDANSETRON 2 MG/ML
INJECTION INTRAMUSCULAR; INTRAVENOUS PRN
Status: DISCONTINUED | OUTPATIENT
Start: 2022-10-06 | End: 2022-10-06 | Stop reason: SDUPTHER

## 2022-10-06 RX ORDER — OXYCODONE HYDROCHLORIDE 5 MG/1
10 TABLET ORAL PRN
Status: DISCONTINUED | OUTPATIENT
Start: 2022-10-06 | End: 2022-10-06 | Stop reason: HOSPADM

## 2022-10-06 RX ORDER — SODIUM CHLORIDE 0.9 % (FLUSH) 0.9 %
5-40 SYRINGE (ML) INJECTION PRN
Status: DISCONTINUED | OUTPATIENT
Start: 2022-10-06 | End: 2022-10-06 | Stop reason: HOSPADM

## 2022-10-06 RX ORDER — TRAZODONE HYDROCHLORIDE 100 MG/1
100 TABLET ORAL NIGHTLY PRN
Status: DISCONTINUED | OUTPATIENT
Start: 2022-10-06 | End: 2022-10-07 | Stop reason: HOSPADM

## 2022-10-06 RX ORDER — SODIUM CHLORIDE 9 MG/ML
INJECTION, SOLUTION INTRAVENOUS PRN
Status: DISCONTINUED | OUTPATIENT
Start: 2022-10-06 | End: 2022-10-06 | Stop reason: HOSPADM

## 2022-10-06 RX ORDER — MAGNESIUM HYDROXIDE 1200 MG/15ML
LIQUID ORAL CONTINUOUS PRN
Status: COMPLETED | OUTPATIENT
Start: 2022-10-06 | End: 2022-10-06

## 2022-10-06 RX ORDER — MEPERIDINE HYDROCHLORIDE 25 MG/ML
12.5 INJECTION INTRAMUSCULAR; INTRAVENOUS; SUBCUTANEOUS EVERY 5 MIN PRN
Status: DISCONTINUED | OUTPATIENT
Start: 2022-10-06 | End: 2022-10-06 | Stop reason: HOSPADM

## 2022-10-06 RX ORDER — KETOROLAC TROMETHAMINE 30 MG/ML
INJECTION, SOLUTION INTRAMUSCULAR; INTRAVENOUS PRN
Status: DISCONTINUED | OUTPATIENT
Start: 2022-10-06 | End: 2022-10-06 | Stop reason: SDUPTHER

## 2022-10-06 RX ORDER — SODIUM CHLORIDE, SODIUM LACTATE, POTASSIUM CHLORIDE, AND CALCIUM CHLORIDE .6; .31; .03; .02 G/100ML; G/100ML; G/100ML; G/100ML
IRRIGANT IRRIGATION PRN
Status: DISCONTINUED | OUTPATIENT
Start: 2022-10-06 | End: 2022-10-06 | Stop reason: ALTCHOICE

## 2022-10-06 RX ORDER — OXYCODONE HYDROCHLORIDE AND ACETAMINOPHEN 5; 325 MG/1; MG/1
1 TABLET ORAL EVERY 4 HOURS PRN
Status: DISCONTINUED | OUTPATIENT
Start: 2022-10-06 | End: 2022-10-07 | Stop reason: HOSPADM

## 2022-10-06 RX ORDER — ONDANSETRON 2 MG/ML
4 INJECTION INTRAMUSCULAR; INTRAVENOUS
Status: COMPLETED | OUTPATIENT
Start: 2022-10-06 | End: 2022-10-06

## 2022-10-06 RX ORDER — OXYCODONE HYDROCHLORIDE 5 MG/1
5 TABLET ORAL PRN
Status: DISCONTINUED | OUTPATIENT
Start: 2022-10-06 | End: 2022-10-06 | Stop reason: HOSPADM

## 2022-10-06 RX ADMIN — DEXAMETHASONE SODIUM PHOSPHATE 8 MG: 4 INJECTION, SOLUTION INTRAMUSCULAR; INTRAVENOUS at 13:38

## 2022-10-06 RX ADMIN — KETOROLAC TROMETHAMINE 30 MG: 30 INJECTION, SOLUTION INTRAMUSCULAR at 14:31

## 2022-10-06 RX ADMIN — HYDROMORPHONE HYDROCHLORIDE 0.25 MG: 1 INJECTION, SOLUTION INTRAMUSCULAR; INTRAVENOUS; SUBCUTANEOUS at 15:20

## 2022-10-06 RX ADMIN — LIDOCAINE HYDROCHLORIDE 60 MG: 20 INJECTION, SOLUTION INFILTRATION; PERINEURAL at 13:27

## 2022-10-06 RX ADMIN — PIPERACILLIN AND TAZOBACTAM 3375 MG: 3; .375 INJECTION, POWDER, FOR SOLUTION INTRAVENOUS at 17:13

## 2022-10-06 RX ADMIN — ONDANSETRON HYDROCHLORIDE 4 MG: 2 INJECTION, SOLUTION INTRAMUSCULAR; INTRAVENOUS at 14:18

## 2022-10-06 RX ADMIN — SUCRALFATE 1 G: 1 TABLET ORAL at 20:49

## 2022-10-06 RX ADMIN — Medication 10 ML: at 20:50

## 2022-10-06 RX ADMIN — PROPOFOL 150 MG: 10 INJECTION, EMULSION INTRAVENOUS at 13:27

## 2022-10-06 RX ADMIN — ONDANSETRON HYDROCHLORIDE 4 MG: 2 INJECTION, SOLUTION INTRAMUSCULAR; INTRAVENOUS at 06:08

## 2022-10-06 RX ADMIN — SUCRALFATE 1 G: 1 TABLET ORAL at 18:08

## 2022-10-06 RX ADMIN — ROCURONIUM BROMIDE 50 MG: 10 INJECTION, SOLUTION INTRAVENOUS at 13:27

## 2022-10-06 RX ADMIN — ONDANSETRON HYDROCHLORIDE 4 MG: 2 INJECTION, SOLUTION INTRAMUSCULAR; INTRAVENOUS at 15:09

## 2022-10-06 RX ADMIN — OXYCODONE HYDROCHLORIDE AND ACETAMINOPHEN 2 TABLET: 5; 325 TABLET ORAL at 20:56

## 2022-10-06 RX ADMIN — PANTOPRAZOLE SODIUM 40 MG: 40 INJECTION, POWDER, FOR SOLUTION INTRAVENOUS at 20:49

## 2022-10-06 RX ADMIN — PIPERACILLIN AND TAZOBACTAM 3375 MG: 3; .375 INJECTION, POWDER, FOR SOLUTION INTRAVENOUS at 06:05

## 2022-10-06 RX ADMIN — SODIUM CHLORIDE, POTASSIUM CHLORIDE, SODIUM LACTATE AND CALCIUM CHLORIDE: 600; 310; 30; 20 INJECTION, SOLUTION INTRAVENOUS at 13:24

## 2022-10-06 RX ADMIN — SODIUM CHLORIDE: 9 INJECTION, SOLUTION INTRAVENOUS at 17:12

## 2022-10-06 RX ADMIN — FENTANYL CITRATE 100 MCG: 50 INJECTION INTRAMUSCULAR; INTRAVENOUS at 13:27

## 2022-10-06 RX ADMIN — ENOXAPARIN SODIUM 30 MG: 100 INJECTION SUBCUTANEOUS at 13:08

## 2022-10-06 RX ADMIN — KETOROLAC TROMETHAMINE 15 MG: 30 INJECTION, SOLUTION INTRAMUSCULAR; INTRAVENOUS at 06:08

## 2022-10-06 RX ADMIN — SUGAMMADEX 200 MG: 100 INJECTION, SOLUTION INTRAVENOUS at 14:34

## 2022-10-06 RX ADMIN — ONDANSETRON HYDROCHLORIDE 4 MG: 2 INJECTION, SOLUTION INTRAMUSCULAR; INTRAVENOUS at 15:24

## 2022-10-06 RX ADMIN — INDOCYANINE GREEN AND WATER 5 MG: KIT at 12:59

## 2022-10-06 RX ADMIN — ONDANSETRON HYDROCHLORIDE 4 MG: 2 INJECTION, SOLUTION INTRAMUSCULAR; INTRAVENOUS at 20:56

## 2022-10-06 RX ADMIN — TRAZODONE HYDROCHLORIDE 100 MG: 100 TABLET ORAL at 20:49

## 2022-10-06 RX ADMIN — HYDROMORPHONE HYDROCHLORIDE 0.25 MG: 1 INJECTION, SOLUTION INTRAMUSCULAR; INTRAVENOUS; SUBCUTANEOUS at 15:09

## 2022-10-06 ASSESSMENT — PAIN SCALES - GENERAL
PAINLEVEL_OUTOF10: 1
PAINLEVEL_OUTOF10: 8
PAINLEVEL_OUTOF10: 4
PAINLEVEL_OUTOF10: 3
PAINLEVEL_OUTOF10: 4
PAINLEVEL_OUTOF10: 5
PAINLEVEL_OUTOF10: 2
PAINLEVEL_OUTOF10: 1

## 2022-10-06 ASSESSMENT — PAIN - FUNCTIONAL ASSESSMENT
PAIN_FUNCTIONAL_ASSESSMENT: PREVENTS OR INTERFERES SOME ACTIVE ACTIVITIES AND ADLS
PAIN_FUNCTIONAL_ASSESSMENT: PREVENTS OR INTERFERES SOME ACTIVE ACTIVITIES AND ADLS

## 2022-10-06 ASSESSMENT — PAIN DESCRIPTION - PAIN TYPE
TYPE: ACUTE PAIN

## 2022-10-06 ASSESSMENT — PAIN DESCRIPTION - FREQUENCY
FREQUENCY: CONTINUOUS
FREQUENCY: INTERMITTENT
FREQUENCY: INTERMITTENT

## 2022-10-06 ASSESSMENT — PAIN DESCRIPTION - ORIENTATION
ORIENTATION: RIGHT;UPPER
ORIENTATION: RIGHT;UPPER
ORIENTATION: RIGHT;MID
ORIENTATION: RIGHT;UPPER

## 2022-10-06 ASSESSMENT — PAIN DESCRIPTION - DESCRIPTORS
DESCRIPTORS: SORE
DESCRIPTORS: SORE
DESCRIPTORS: ACHING
DESCRIPTORS: ACHING;SORE
DESCRIPTORS: ACHING
DESCRIPTORS: DISCOMFORT
DESCRIPTORS: DISCOMFORT

## 2022-10-06 ASSESSMENT — PAIN DESCRIPTION - LOCATION
LOCATION: ABDOMEN

## 2022-10-06 NOTE — FLOWSHEET NOTE
10/06/22 1215   Vital Signs   Temp 97.9 °F (36.6 °C)   Temp Source Oral   Heart Rate 58   Heart Rate Source Monitor   Resp 20   BP (!) 145/67   BP Location Left upper arm   BP Method Automatic   MAP (Calculated) 93   Patient Position Semi fowlers   Level of Consciousness 0   MEWS Score 1   Pain Assessment   Pain Assessment 0-10   Pain Level 1   Pain Location Abdomen   Pain Orientation Right;Upper   Pain Descriptors Aching   Non-Pharmaceutical Pain Intervention(s) Rest;Repositioned   Opioid-Induced Sedation   POSS Score 1   RASS   Kothari Agitation Sedation Scale (RASS) 0   Oxygen Therapy   SpO2 99 %   O2 Device Nasal cannula

## 2022-10-06 NOTE — PLAN OF CARE
Problem: Discharge Planning  Goal: Discharge to home or other facility with appropriate resources  10/6/2022 0937 by Cristian Paredes RN  Outcome: Progressing  10/6/2022 0154 by Bolivar Esposito RN  Outcome: Progressing  Flowsheets (Taken 10/5/2022 2215)  Discharge to home or other facility with appropriate resources: Identify barriers to discharge with patient and caregiver     Problem: Pain  Goal: Verbalizes/displays adequate comfort level or baseline comfort level  10/6/2022 0937 by Cristian Paredes RN  Outcome: Progressing  10/6/2022 0154 by Bolivar Esposito RN  Outcome: Progressing     Problem: Gastrointestinal - Adult  Goal: Minimal or absence of nausea and vomiting  10/6/2022 0937 by Cristian Paredes RN  Outcome: Progressing  10/6/2022 0154 by Bolivar Esposito RN  Outcome: Progressing  Goal: Maintains adequate nutritional intake  10/6/2022 0937 by Cristian Paredes RN  Outcome: Progressing  10/6/2022 0154 by Bolivar Esposito RN  Outcome: Progressing

## 2022-10-06 NOTE — FLOWSHEET NOTE
10/06/22 0830   Vital Signs   Temp 97.7 °F (36.5 °C)   Temp Source Oral   Heart Rate 62   Heart Rate Source Monitor   Resp 20   /63   BP Location Left upper arm   BP Method Automatic   MAP (Calculated) 83   Patient Position Semi fowlers   Level of Consciousness 0   MEWS Score 1   Pain Assessment   Pain Assessment 0-10   Pain Level 1   Pain Location Abdomen   Pain Orientation Right;Upper   Pain Descriptors Aching   Pain Frequency Intermittent   Non-Pharmaceutical Pain Intervention(s) Rest   Opioid-Induced Sedation   POSS Score 1   RASS   Kothari Agitation Sedation Scale (RASS) 0   Oxygen Therapy   SpO2 96 %   O2 Device None (Room air)     Alert and oriented x4. Skin w/d resp e/e unlabored. Slight upper abd pain to palpation. Spouse at bedside. Patient NPO. CHG wipes given. IVF as ordered. Nursing asmt completed.

## 2022-10-06 NOTE — ANESTHESIA PRE PROCEDURE
Department of Anesthesiology  Preprocedure Note       Name:  Faby Robertson   Age:  43 y.o.  :  1980                                          MRN:  [id number]         Date:  10/6/2022      Surgeon: Hannah Mitchell):  Shannon Tinsley MD    Procedure: Procedure(s):  ROBOTIC CHOLECYSTECTOMY, POSSIBLE OPEN PROCEDURE    Medications prior to admission:   Prior to Admission medications    Medication Sig Start Date End Date Taking?  Authorizing Provider   pantoprazole (PROTONIX) 40 MG tablet Take 1 tablet by mouth 2 times daily (before meals) 10/2/22   [facility]   sucralfate (CARAFATE) 1 GM tablet Take 1 tablet by mouth 4 times daily 10/2/22   [facility], DO   aluminum & magnesium hydroxide-simethicone (MAALOX) [phone] MG/5ML SUSP suspension Take 5 mLs by mouth every 6 hours as needed for Indigestion 10/2/22   Trini Blanca, DO   fenofibrate (TRICOR) 48 MG tablet TAKE 1 TABLET BY MOUTH EVERY DAY 22   Usha Suhltz APRN - CNP   traZODone (DESYREL) 100 MG tablet TAKE 1 TABLET BY MOUTH DAILY AT NIGHT 22   AMANDA Lowry - CNP   aspirin 81 MG EC tablet Take 81 mg by mouth daily    Historical Provider, MD       Current medications:    Current Facility-Administered Medications   Medication Dose Route Frequency Provider Last Rate Last Admin    traZODone (DESYREL) tablet 100 mg  100 mg Oral Nightly PRN Tona Macario MD        piperacillin-tazobactam (ZOSYN) 3,375 mg in sodium chloride 0.9 % 100 mL IVPB extended infusion (mini-bag)  3,375 mg IntraVENous Q8H Shannon Tinsley MD   Stopped at 10/06/22 1005    pantoprazole (PROTONIX) injection 40 mg  40 mg IntraVENous BID Tona Macario MD   40 mg at 10/05/22 2231    sucralfate (CARAFATE) tablet 1 g  1 g Oral 4x Daily Jeremías Cruz MD   1 g at 10/05/22 2231    fenofibrate (TRICOR) tablet 54 mg  54 mg Oral Daily Jeremías Cruz MD   54 mg at 10/05/22 1000    sodium chloride flush 0.9 % injection 5-40 mL 5-40 mL IntraVENous 2 times per day Yusuf Lima MD   10 mL at 10/05/22 2231    sodium chloride flush 0.9 % injection 5-40 mL  5-40 mL IntraVENous PRN Yuusf Lima MD        0.9 % sodium chloride infusion   IntraVENous PRN Yusuf Lima MD        enoxaparin Sodium (LOVENOX) injection 30 mg  30 mg SubCUTAneous BID Yusuf Lima MD   30 mg at 10/06/22 1308    ondansetron (ZOFRAN-ODT) disintegrating tablet 4 mg  4 mg Oral Q8H PRN Yusuf Lima MD        Or    [facility]ansetron [facility]) injection 4 mg  4 mg IntraVENous Q6H PRN Yusuf Lima MD   4 mg at 10/06/22 4343    polyethylene glycol (GLYCOLAX) packet 17 g  17 g Oral Daily PRN Yusuf Lima MD        acetaminophen (TYLENOL) tablet 650 mg  650 mg Oral Q6H PRN Yusuf Lima MD   650 mg at 10/03/22 2031    Or    acetaminophen (TYLENOL) suppository 650 mg  650 mg Rectal Q6H PRN Yusuf Lima MD        0.9 % sodium chloride infusion   IntraVENous Continuous Yusuf Lima  mL/hr at 10/05/22 1612 New Bag at 10/05/22 1612    promethazine (PHENERGAN) injection 6.25 mg  6.25 mg IntraMUSCular Q6H PRN Yusuf Lima MD        ketorolac (TORADOL) injection 15 mg  15 mg IntraVENous Q6H PRN Yusuf Lima MD   15 mg at 10/06/22 0608       Allergies:  No Known Allergies    Problem List:    Patient Active Problem List   Diagnosis Code    Primary insomnia F51.01    Mild obstructive sleep apnea G47.33    Obesity (BMI 30.0-34. 9) E66.9    Hypertriglyceridemia E78.1    Mixed hyperlipidemia E78.2    Tobacco use Z72.0    Primary hypertension I10    Intractable nausea and vomiting R11.2    Cholecystitis K81.9       Past Medical History:        Diagnosis Date    AA (alcohol abuse)     Depression     Hyperlipidemia        Past Surgical History:        Procedure Laterality Date    UPPER GASTROINTESTINAL ENDOSCOPY N/A 10/3/2022    EGD W/ANES.  performed by Ángel Salgado DO at [address] [facility]       Social History:    Social History     Tobacco Use    Smoking status: Former     Packs/day: 1.00     Years: 14.00     Pack years: 14.00     Types: Cigarettes     Start date: 1995     Quit date: 2020     Years since quittin.2    Smokeless tobacco: Former     Types: Chew   Substance Use Topics    Alcohol use: Not Currently     Comment: recovering alcoholic                                 Counseling given: Not Answered      Vital Signs (Current):   Vitals:    10/05/22 2215 10/06/22 0324 10/06/22 0830 10/06/22 1215   BP: (!) 117/59 120/63 123/63 (!) 145/67   Pulse: 72 78 62 58   Resp: 18 18 20 20   Temp: 98.3 °F (36.8 °C) 98.5 °F (36.9 °C) 97.7 °F (36.5 °C) 97.9 °F (36.6 °C)   TempSrc: Oral Oral Oral Oral   SpO2: 96% 96% 96% 99%   Weight:       Height:                                                  BP Readings from Last 3 Encounters:   10/06/22 (!) 145/67   10/02/22 135/77   22 112/64       NPO Status: Time of last liquid consumption: 0000                        Time of last solid consumption: 0000                        Date of last liquid consumption: 10/02/22                        Date of last solid food consumption: 10/05/22    BMI:   Wt Readings from Last 3 Encounters:   10/02/22 238 lb 1.6 oz (108 kg)   10/02/22 230 lb (104.3 kg)   22 232 lb (105.2 kg)     Body mass index is 32.29 kg/m².     CBC:   Lab Results   Component Value Date/Time    WBC 9.2 10/05/2022 12:37 PM    RBC 4.77 10/05/2022 12:37 PM    HGB 13.7 10/05/2022 12:37 PM    HCT 40.2 10/05/2022 12:37 PM    MCV 84.3 10/05/2022 12:37 PM    RDW 13.7 10/05/2022 12:37 PM     10/05/2022 12:37 PM       CMP:   Lab Results   Component Value Date/Time     10/05/2022 12:37 PM    K 4.4 10/05/2022 12:37 PM    K 4.2 10/03/2022 05:53 AM     10/05/2022 12:37 PM    CO2 30 10/05/2022 12:37 PM    BUN 8 10/05/2022 12:37 PM    CREATININE 1.2 10/05/2022 12:37 PM    GFRAA >60 10/05/2022 12:37 PM AGRATIO 1.6 10/05/2022 12:37 PM    LABGLOM >60 10/05/2022 12:37 PM    GLUCOSE 114 10/05/2022 12:37 PM    PROT 6.8 10/05/2022 12:37 PM    CALCIUM 9.1 10/05/2022 12:37 PM    BILITOT 0.6 10/05/2022 12:37 PM    ALKPHOS 71 10/05/2022 12:37 PM    AST 19 10/05/2022 12:37 PM    ALT 23 10/05/2022 12:37 PM       POC Tests:   Recent Labs     10/06/22  1127   POCGLU 101*       Coags: No results found for: PROTIME, INR, APTT    HCG (If Applicable): No results found for: PREGTESTUR, PREGSERUM, HCG, HCGQUANT     ABGs: No results found for: PHART, PO2ART, SNW2KEW, IGP7RER, BEART, C5OLLODR     Type & Screen (If Applicable):  No results found for: LABABO, LABRH    Drug/Infectious Status (If Applicable):  No results found for: HIV, HEPCAB    COVID-19 Screening (If Applicable):   Lab Results   Component Value Date/Time    COVID19 NOT DETECTED 10/02/2022 07:22 PM    COVID19 NOT DETECTED 12/02/2020 01:06 PM           Anesthesia Evaluation  Patient summary reviewed no history of anesthetic complications:   Airway: Mallampati: III  TM distance: >3 FB   Neck ROM: full  Mouth opening: > = 3 FB   Dental: normal exam         Pulmonary:normal exam  breath sounds clear to auscultation  (+) sleep apnea:      (-) COPD and asthma                           Cardiovascular:  Exercise tolerance: good (>4 METS),   (+) hypertension:,     (-) CAD,  angina and  RICHARDS      Rhythm: regular  Rate: normal                    Neuro/Psych:   (+) psychiatric history:   (-) seizures and TIA           GI/Hepatic/Renal:        (-) GERD, liver disease and no renal disease       Endo/Other:        (-) diabetes mellitus               Abdominal:             Vascular: negative vascular ROS. Other Findings:           Anesthesia Plan      general     ASA 2     (I discussed with the patient the risks and benefits of PIV, anesthesia, IV Narcotics, PACU.   All questions were answered the patient agrees with the plan and wishes to proceed)  Induction: intravenous.                             Jordan Marcus MD   10/6/2022

## 2022-10-06 NOTE — PROGRESS NOTES
Pt to PACU, placed on bedside monitor. NSR 86, resp e/e unlabored SPO2 92% on RA w/ CSPO2 monitoring. ABD soft w/ 3 dressings noted CDI, small amount of blood noted to pt left dressing. RN @ bedside. Phase I (PACU)  1. Patient is identified using name and the date of birth. 2.  The patient is free from signs and symptoms of chemical, electrical, laser, radiation, positioning, or transfer/transport injury. 3.  The patient receives appropriate medication(s), safely administered during the Perioperative period. 4.  The patient has wound/tissue perfusion consistent with or improved from baseline levels established preoperatively. 5.  The patient is at or returning to normothermia at the conclusion of the immediate postoperative period. 6.  The patient's fluid, electrolyte, and acid base balances are consistent with or improved from baseline levels established preoperatively. 7.  The patient's pulmonary function is consistent with or improved from baseline levels established preoperatively. 8.  The patient's cardiovascular status is consistent with or improved from baseline levels established preoperatively. 9.  The patient/caregiver participates in decisions affecting his or her Perioperative plan of care. 10. The patient's care is consistent with the individualized Perioperative plan of care. 11. The patient's right to privacy is maintained. 12. The patient is the recipient of competent and ethical care within legal standards of practice. 13.  The patient's value system, lifestyle, ethnicity, and culture are considered, respected, and incorporated in the Perioperative plan of care. 14.  The patient demonstrates and/or reports adequate pain control throughout the the Perioperative period. 15. The patient's neurological status is consistent with or improved from baseline levels established preoperatively.   16.  The patient/caregiver demonstrates knowledge of the expected responses to the operative or invasive procedure. 17.  Patient/Caregiver has reduced anxiety. Interventions- Familiarize with environment and equipment.   18.  Other:  19.Other:

## 2022-10-06 NOTE — PLAN OF CARE
Problem: Pain  Goal: Verbalizes/displays adequate comfort level or baseline comfort level  Outcome: Progressing     Problem: Gastrointestinal - Adult  Goal: Minimal or absence of nausea and vomiting  Outcome: Progressing  Goal: Maintains adequate nutritional intake  Outcome: Progressing

## 2022-10-06 NOTE — ANESTHESIA POSTPROCEDURE EVALUATION
Department of Anesthesiology  Postprocedure Note    Patient: Teddie Burkitt  MRN: [id number]  YOB: 1980  Date of evaluation: 10/6/2022      Procedure Summary     Date: 10/06/22 Room / Location: [address] [place] [address] / [facility]    Anesthesia Start: 5198 Anesthesia Stop: 1448    Procedure: ROBOTIC CHOLECYSTECTOMY (Abdomen) Diagnosis:       Acute cholecystitis      (Acute cholecystitis [K81.0])    Surgeons: Russell Betts MD Responsible Provider: Mere Goldberg MD    Anesthesia Type: general ASA Status: 2          Anesthesia Type: No value filed.     Gayatri Phase I: Gayatri Score: 10    Gayatri Phase II: Gayatri Score: 10      Anesthesia Post Evaluation    Comments: Postoperative Anesthesia Note    Name:    Teddie Burkitt  MRN:      [id number]    Patient Vitals in the past 12 hrs:  10/06/22 1530, BP:132/73, Pulse:79, Resp:20, SpO2:91 %  10/06/22 1515, BP:127/69, Pulse:81, Resp:16, SpO2:92 %  10/06/22 1511, Pulse:93  10/06/22 1500, BP:130/70, Pulse:89, Resp:12, SpO2:93 %  10/06/22 1455, BP:136/69, Pulse:91, Resp:13, SpO2:93 %  10/06/22 1450, BP:136/64, Pulse:89, Resp:15, SpO2:92 %  10/06/22 1445, BP:(!) 148/58, Temp:98 °F (36.7 °C), Temp src:Infrared, Pulse:97, Resp:16, SpO2:92 %  10/06/22 1444, Pulse:86  10/06/22 1215, BP:(!) 145/67, Temp:97.9 °F (36.6 °C), Temp src:Oral, Pulse:58, Resp:20, SpO2:99 %  10/06/22 0830, BP:123/63, Temp:97.7 °F (36.5 °C), Temp src:Oral, Pulse:62, Resp:20, SpO2:96 %     LABS:    CBC  Lab Results       Component                Value               Date/Time                  WBC                      9.2                 10/05/2022 12:37 PM        HGB                      13.7                10/05/2022 12:37 PM        HCT                      40.2 (L)            10/05/2022 12:37 PM        PLT                      203                 10/05/2022 12:37 PM   RENAL  Lab Results       Component                Value               Date/Time                  NA 140                 10/05/2022 12:37 PM        K                        4.4                 10/05/2022 12:37 PM        K                        4.2                 10/03/2022 05:53 AM        CL                       103                 10/05/2022 12:37 PM        CO2                      30                  10/05/2022 12:37 PM        BUN                      8                   10/05/2022 12:37 PM        CREATININE               1.2                 10/05/2022 12:37 PM        GLUCOSE                  114 (H)             10/05/2022 12:37 PM   COAGS  No results found for: PROTIME, INR, APTT    Intake & Output:  @85OYVE@    Nausea & Vomiting:  No    Level of Consciousness:  Awake    Pain Assessment:  Adequate analgesia    Anesthesia Complications:  No apparent anesthetic complications    SUMMARY      Vital signs stable  OK to discharge from Stage I post anesthesia care.   Care transferred from Anesthesiology department on discharge from perioperative area

## 2022-10-06 NOTE — PROGRESS NOTES
Shift assessment complete. Alert and oriented. Patient reports pain 5/10 in abdomen. Prn medication administered for pain, see MAR. Patient reports nausea. Prn medication administered for nausea, see MAR. Patient educated about NPO diet at midnight. Patient verbalizes understanding. Patient denies any other needs at this time. Bed in lowest position. Side rails up x2. Call light in reach.

## 2022-10-06 NOTE — H&P
I have reviewed the history and physical dated October/3/2022 and examined the patient and find no relevant changes. I have reviewed with the patient and/or family the risks, benefits, and alternatives to the procedure.

## 2022-10-06 NOTE — PROGRESS NOTES
VSS. Alert and oriented. Dressings c/d/I to mid abd. Arrived from OR. No s/s distress noted. Call light in easy reach. Super tray ordered.

## 2022-10-06 NOTE — PROGRESS NOTES
Report called to [facility]. Pt transported to 219 in stable condition accompanied by transport. Will transfer care.

## 2022-10-06 NOTE — PROGRESS NOTES
Hospitalist Progress Note      PCP: Tomy Larios, APRN - CNP    Date of Admission: 10/2/2022    Chief Complaint: abd pain. Subjective:       Abd pain better controlled today. Had liquid BM overnight. Medications:  Reviewed    Infusion Medications    sodium chloride      sodium chloride 100 mL/hr at 10/05/22 1612     Scheduled Medications    piperacillin-tazobactam  3,375 mg IntraVENous Q8H    pantoprazole  40 mg IntraVENous BID    sucralfate  1 g Oral 4x Daily    fenofibrate  54 mg Oral Daily    sodium chloride flush  5-40 mL IntraVENous 2 times per day    enoxaparin  30 mg SubCUTAneous BID     PRN Meds: traZODone, sodium chloride flush, sodium chloride, ondansetron **OR** ondansetron, polyethylene glycol, acetaminophen **OR** acetaminophen, promethazine, ketorolac    No intake or output data in the 24 hours ending 10/06/22 1241      Physical Exam Performed:    BP (!) 145/67   Pulse 58   Temp 97.9 °F (36.6 °C) (Oral)   Resp 20   Ht 6' (1.829 m)   Wt 238 lb 1.6 oz (108 kg)   SpO2 99%   BMI 32.29 kg/m²     General appearance: No apparent distress, appears stated age and cooperative. HEENT: Pupils equal, round, and reactive to light. Conjunctivae/corneas clear. Neck: Supple, with full range of motion. No jugular venous distention. Trachea midline. Respiratory:  Normal respiratory effort. Clear to auscultation, bilaterally without Rales/Wheezes/Rhonchi. Cardiovascular: Regular rate and rhythm with normal S1/S2 without murmurs, rubs or gallops. Abdomen: Soft, non-tender, non-distended with normal bowel sounds. Musculoskeletal: No clubbing, cyanosis or edema bilaterally. Full range of motion without deformity. Skin: Skin color, texture, turgor normal.  No rashes or lesions. Neurologic:  Neurovascularly intact without any focal sensory/motor deficits.  Cranial nerves: II-XII intact, grossly non-focal.  Psychiatric: Alert and oriented, thought content appropriate, normal insight  Capillary Refill: Brisk, 3 seconds, normal   Peripheral Pulses: +2 palpable, equal bilaterally       Labs:   Recent Labs     10/05/22  1237   WBC 9.2   HGB 13.7   HCT 40.2*          Recent Labs     10/05/22  1237      K 4.4      CO2 30   BUN 8   CREATININE 1.2   CALCIUM 9.1       Recent Labs     10/05/22  1237   AST 19   ALT 23   BILITOT 0.6   ALKPHOS 71       No results for input(s): INR in the last 72 hours. No results for input(s): [place] in the last 72 hours. Urinalysis:      Lab Results   Component Value Date/Time    NITRU Negative 10/02/2022 08:27 AM    WBCUA 0-2 10/02/2022 08:27 AM    RBCUA None seen 10/02/2022 08:27 AM    BLOODU Negative 10/02/2022 08:27 AM    SPECGRAV <=1.005 10/02/2022 08:27 AM    GLUCOSEU Negative 10/02/2022 08:27 AM       Radiology:  [facility]   Final Result   Gallbladder is not definitively visualized through 4 hours, which can reflect   cholecystitis in the appropriate clinical setting. RECOMMENDATIONS:   Unavailable                 Assessment/Plan:    Active Hospital Problems    Diagnosis     Cholecystitis [K81.9]      Priority: Medium    Intractable nausea and vomiting [R11.2]      Priority: Medium       Abd Pain. PUD ruled out  Strong suspect severely symptomatic Biliary Colic and cholecystitis. Persisting pain and tender on exam  GI involved. - EGD - unremarkable              - HIDA scan - gall bladder non visualized. - IVF support. - ok for PRN toradol              - avoid IV narcotics. - cont PPI. - surgery team involved. - started Iv Abx. - plan for OR this afternoon. DVT Prophylaxis: lovenox  Diet: ADULT DIET; Clear Liquid  Code Status: Full Code        Dispo - Med surg.           Clara Beard MD

## 2022-10-06 NOTE — FLOWSHEET NOTE
10/06/22 1215   Vital Signs   Temp 97.9 °F (36.6 °C)   Temp Source Oral   Heart Rate 58   Heart Rate Source Monitor   Resp 20   BP (!) 145/67   BP Location Left upper arm   BP Method Automatic   MAP (Calculated) 93   Patient Position Semi fowlers   Level of Consciousness 0   MEWS Score 1   Pain Assessment   Pain Assessment 0-10   Pain Level 1   Pain Location Abdomen   Pain Orientation Right;Upper   Pain Descriptors Aching   Non-Pharmaceutical Pain Intervention(s) Rest;Repositioned   Opioid-Induced Sedation   POSS Score 1   RASS   Kothari Agitation Sedation Scale (RASS) 0   Oxygen Therapy   SpO2 99 %   O2 Device Nasal cannula       Surgery called and putting in for transport. VSS. No s/s distress noted. Consent signed.

## 2022-10-06 NOTE — PROGRESS NOTES
General Surgery Daily Progress Note    Pt Name: Faby Robertson  Medical Record Number: [id number]  Date of Birth 1980   Today's Date: 10/6/2022    ASSESSMENT  HIDA scan: non-visualization of the gallbladder through 4 hours ? For cholecystitis   CT abd and pelvis: normal gallbladder, fat containing umbilical hernia, fat containing inguinal hernias  ABD; soft, + severe RUQ tenderness with guarding, +N, no V, + gold/yellow diarrhea  Leuks normal  LFTs normal  VSS  Pt states \"I am ready to get this over with. \"    PLAN  Zosyn  IVF  Pain and nausea control  Pt to OR this afternoon for robotic cholecystectomy possible open cholecystectomy with Dr. Melissa Feliz is unchanged from yesterday. Pain is not well controlled. He has nausea and no vomiting. He has passed flatus and has had a bowel movement. He is NPO. Current activity is up with assistance    OBJECTIVE  VITALS:  height is 6' (1.829 m) and weight is 238 lb 1.6 oz (108 kg). His oral temperature is 97.7 °F (36.5 °C). His blood pressure is 123/63 and his pulse is 62. His respiration is 20 and oxygen saturation is 96%. VITALS:  /63   Pulse 62   Temp 97.7 °F (36.5 °C) (Oral)   Resp 20   Ht 6' (1.829 m)   Wt 238 lb 1.6 oz (108 kg)   SpO2 96%   BMI 32.29 kg/m²   INTAKE/OUTPUT:  No intake or output data in the 24 hours ending 10/06/22 1055  URINARY CATHETER OUTPUT ([place]):     GENERAL: alert, cooperative, no distress    No intake/output data recorded. No intake/output data recorded.     LABS  Recent Labs     10/05/22  1237   WBC 9.2   HGB 13.7   HCT 40.2*         K 4.4      CO2 30   BUN 8   CREATININE 1.2   CALCIUM 9.1   AST 19   ALT 23   BILITOT 0.6     CBC with Differential:    Lab Results   Component Value Date/Time    WBC 9.2 10/05/2022 12:37 PM    RBC 4.77 10/05/2022 12:37 PM    HGB 13.7 10/05/2022 12:37 PM    HCT 40.2 10/05/2022 12:37 PM     10/05/2022 12:37 PM    MCV 84.3 10/05/2022 12:37 PM    MCH 28.7 10/05/2022 12:37 PM    MCHC 34.0 10/05/2022 12:37 PM    RDW 13.7 10/05/2022 12:37 PM    LYMPHOPCT 23.9 10/05/2022 12:37 PM    MONOPCT 8.7 10/05/2022 12:37 PM    BASOPCT 1.1 10/05/2022 12:37 PM    MONOSABS 0.8 10/05/2022 12:37 PM    LYMPHSABS 2.2 10/05/2022 12:37 PM    EOSABS 0.6 10/05/2022 12:37 PM    BASOSABS 0.1 10/05/2022 12:37 PM     CMP:    Lab Results   Component Value Date/Time     10/05/2022 12:37 PM    K 4.4 10/05/2022 12:37 PM    K 4.2 10/03/2022 05:53 AM     10/05/2022 12:37 PM    CO2 30 10/05/2022 12:37 PM    BUN 8 10/05/2022 12:37 PM    CREATININE 1.2 10/05/2022 12:37 PM    GFRAA >60 10/05/2022 12:37 PM    AGRATIO 1.6 10/05/2022 12:37 PM    LABGLOM >60 10/05/2022 12:37 PM    GLUCOSE 114 10/05/2022 12:37 PM    PROT 6.8 10/05/2022 12:37 PM    LABALBU 4.2 10/05/2022 12:37 PM    CALCIUM 9.1 10/05/2022 12:37 PM    BILITOT 0.6 10/05/2022 12:37 PM    ALKPHOS 71 10/05/2022 12:37 PM    AST 19 10/05/2022 12:37 PM    ALT 23 10/05/2022 12:37 PM         AMANDA Fernandez - CNP  Electronically signed 10/6/2022 at 10:55 AM

## 2022-10-06 NOTE — PROGRESS NOTES
Handoff report and transfer of care given at bedside to  Kelvin Sosa. ANT. Patient in stable condition, denies needs/concerns at this time. Call light within reach. No s/s distress noted.

## 2022-10-07 VITALS
DIASTOLIC BLOOD PRESSURE: 80 MMHG | BODY MASS INDEX: 32.25 KG/M2 | HEART RATE: 75 BPM | OXYGEN SATURATION: 97 % | SYSTOLIC BLOOD PRESSURE: 130 MMHG | TEMPERATURE: 97 F | WEIGHT: 238.1 LBS | RESPIRATION RATE: 16 BRPM | HEIGHT: 72 IN

## 2022-10-07 PROCEDURE — C9113 INJ PANTOPRAZOLE SODIUM, VIA: HCPCS | Performed by: SURGERY

## 2022-10-07 PROCEDURE — 99024 POSTOP FOLLOW-UP VISIT: CPT | Performed by: NURSE PRACTITIONER

## 2022-10-07 PROCEDURE — 96376 TX/PRO/DX INJ SAME DRUG ADON: CPT

## 2022-10-07 PROCEDURE — 2580000003 HC RX 258: Performed by: SURGERY

## 2022-10-07 PROCEDURE — 99239 HOSP IP/OBS DSCHRG MGMT >30: CPT | Performed by: INTERNAL MEDICINE

## 2022-10-07 PROCEDURE — 6370000000 HC RX 637 (ALT 250 FOR IP): Performed by: SURGERY

## 2022-10-07 PROCEDURE — 96366 THER/PROPH/DIAG IV INF ADDON: CPT

## 2022-10-07 PROCEDURE — 6360000002 HC RX W HCPCS: Performed by: SURGERY

## 2022-10-07 PROCEDURE — G0378 HOSPITAL OBSERVATION PER HR: HCPCS

## 2022-10-07 RX ORDER — AMOXICILLIN AND CLAVULANATE POTASSIUM 875; 125 MG/1; MG/1
1 TABLET, FILM COATED ORAL 2 TIMES DAILY
Qty: 14 TABLET | Refills: 0 | Status: SHIPPED | OUTPATIENT
Start: 2022-10-07 | End: 2022-10-14

## 2022-10-07 RX ORDER — OXYCODONE HYDROCHLORIDE AND ACETAMINOPHEN 5; 325 MG/1; MG/1
1 TABLET ORAL EVERY 8 HOURS PRN
Qty: 15 TABLET | Refills: 0 | Status: SHIPPED | OUTPATIENT
Start: 2022-10-07 | End: 2022-10-12

## 2022-10-07 RX ADMIN — OXYCODONE HYDROCHLORIDE AND ACETAMINOPHEN 2 TABLET: 5; 325 TABLET ORAL at 03:18

## 2022-10-07 RX ADMIN — SUCRALFATE 1 G: 1 TABLET ORAL at 09:48

## 2022-10-07 RX ADMIN — FENOFIBRATE 54 MG: 54 TABLET ORAL at 09:48

## 2022-10-07 RX ADMIN — SODIUM CHLORIDE: 9 INJECTION, SOLUTION INTRAVENOUS at 09:47

## 2022-10-07 RX ADMIN — SUCRALFATE 1 G: 1 TABLET ORAL at 14:57

## 2022-10-07 RX ADMIN — ONDANSETRON HYDROCHLORIDE 4 MG: 2 INJECTION, SOLUTION INTRAMUSCULAR; INTRAVENOUS at 09:47

## 2022-10-07 RX ADMIN — PIPERACILLIN AND TAZOBACTAM 3375 MG: 3; .375 INJECTION, POWDER, FOR SOLUTION INTRAVENOUS at 09:54

## 2022-10-07 RX ADMIN — ONDANSETRON HYDROCHLORIDE 4 MG: 2 INJECTION, SOLUTION INTRAMUSCULAR; INTRAVENOUS at 03:18

## 2022-10-07 RX ADMIN — OXYCODONE HYDROCHLORIDE AND ACETAMINOPHEN 1 TABLET: 5; 325 TABLET ORAL at 09:48

## 2022-10-07 RX ADMIN — PIPERACILLIN AND TAZOBACTAM 3375 MG: 3; .375 INJECTION, POWDER, FOR SOLUTION INTRAVENOUS at 01:24

## 2022-10-07 RX ADMIN — PANTOPRAZOLE SODIUM 40 MG: 40 INJECTION, POWDER, FOR SOLUTION INTRAVENOUS at 09:47

## 2022-10-07 ASSESSMENT — PAIN SCALES - GENERAL
PAINLEVEL_OUTOF10: 6
PAINLEVEL_OUTOF10: 7

## 2022-10-07 ASSESSMENT — PAIN DESCRIPTION - LOCATION: LOCATION: ABDOMEN

## 2022-10-07 ASSESSMENT — PAIN DESCRIPTION - DESCRIPTORS: DESCRIPTORS: ACHING;SORE

## 2022-10-07 NOTE — DISCHARGE SUMMARY
Hospital Medicine Discharge Summary    Patient ID: Kulwinder Tracey      Patient's PCP: Damien Taylor, APRN - CNP    Admit Date: 10/2/2022     Discharge Date:   10/7/2022    Admitting Provider: Laneta Gosselin, MD     Discharge Provider: Grace Jauregui MD     Discharge Diagnoses: Active Hospital Problems    Diagnosis     Cholecystitis [K81.9]      Priority: Medium    Intractable nausea and vomiting [R11.2]      Priority: Medium       The patient was seen and examined on day of discharge and this discharge summary is in conjunction with any daily progress note from day of discharge. Hospital Course: 35yo man presented with severe RUQ pain and emesis. Abd Pain. PUD ruled out  Strong suspect severely symptomatic Biliary Colic and acute cholecystitis. Persisting pain and tender on exam  GI involved. - EGD - unremarkable              - HIDA scan - gall bladder non visualized. - IVF support. - ok for PRN toradol              - avoid IV narcotics. - cont PPI. - surgery team involved. - started Iv Abx.                           - OR for robotic cholecystectomy 10/6  Better today  Will complete PO Augmentin on Discharge. PO percocet for pain - limit to 5 days. Physical Exam Performed:     /80   Pulse 75   Temp 97 °F (36.1 °C) (Oral)   Resp 16   Ht 6' (1.829 m)   Wt 238 lb 1.6 oz (108 kg)   SpO2 97%   BMI 32.29 kg/m²       General appearance:  No apparent distress, appears stated age and cooperative. HEENT:  Normal cephalic, atraumatic without obvious deformity. Pupils equal, round, and reactive to light. Extra ocular muscles intact. Conjunctivae/corneas clear. Neck: Supple, with full range of motion. No jugular venous distention. Trachea midline. Respiratory:  Normal respiratory effort. Clear to auscultation, bilaterally without Rales/Wheezes/Rhonchi.   Cardiovascular: Regular rate and rhythm with normal S1/S2 without murmurs, rubs or gallops. Abdomen: Soft, non-tender, non-distended with normal bowel sounds. Musculoskeletal:  No clubbing, cyanosis or edema bilaterally. Full range of motion without deformity. Skin: Skin color, texture, turgor normal.  No rashes or lesions. Neurologic:  Neurovascularly intact without any focal sensory/motor deficits. Cranial nerves: II-XII intact, grossly non-focal.  Psychiatric:  Alert and oriented, thought content appropriate, normal insight  Capillary Refill: Brisk,< 3 seconds   Peripheral Pulses: +2 palpable, equal bilaterally       Labs: For convenience and continuity at follow-up the following most recent labs are provided:      CBC:    Lab Results   Component Value Date/Time    WBC 9.2 10/05/2022 12:37 PM    HGB 13.7 10/05/2022 12:37 PM    HCT 40.2 10/05/2022 12:37 PM     10/05/2022 12:37 PM       Renal:    Lab Results   Component Value Date/Time     10/05/2022 12:37 PM    K 4.4 10/05/2022 12:37 PM    K 4.2 10/03/2022 05:53 AM     10/05/2022 12:37 PM    CO2 30 10/05/2022 12:37 PM    BUN 8 10/05/2022 12:37 PM    CREATININE 1.2 10/05/2022 12:37 PM    CALCIUM 9.1 10/05/2022 12:37 PM         Significant Diagnostic Studies    Radiology:   [facility] HEPATOBILIARY   Final Result   Gallbladder is not definitively visualized through 4 hours, which can reflect   cholecystitis in the appropriate clinical setting.       RECOMMENDATIONS:   Unavailable                Consults:     IP CONSULT TO HOSPITALIST  IP CONSULT TO GI  IP CONSULT TO GENERAL SURGERY    Disposition:  home    Condition at Discharge: Stable    Discharge Instructions/Follow-up:  surgery 1 week     Code Status:  Full Code     Activity: activity as tolerated    Diet: low fat, low cholesterol diet      Discharge Medications:     Current Discharge Medication List             Details   oxyCODONE-acetaminophen (PERCOCET) 5-325 MG per tablet Take 1 tablet by mouth every 8 hours as needed for Pain for up to 5 days. Qty: 15 tablet, Refills: 0    Comments: Reduce doses taken as pain becomes manageable  Associated Diagnoses: Cholecystitis      amoxicillin-clavulanate (AUGMENTIN) 875-125 MG per tablet Take 1 tablet by mouth 2 times daily for 7 days  Qty: 14 tablet, Refills: 0                Details   pantoprazole (PROTONIX) 40 MG tablet Take 1 tablet by mouth 2 times daily (before meals)  Qty: 60 tablet, Refills: 1      sucralfate (CARAFATE) 1 GM tablet Take 1 tablet by mouth 4 times daily  Qty: 120 tablet, Refills: 0      aluminum & magnesium hydroxide-simethicone (MAALOX) 200-200-20 MG/5ML SUSP suspension Take 5 mLs by mouth every 6 hours as needed for Indigestion  Qty: 1 each, Refills: 0      fenofibrate (TRICOR) 48 MG tablet TAKE 1 TABLET BY MOUTH EVERY DAY  Qty: 90 tablet, Refills: 1    Associated Diagnoses: Hypertriglyceridemia      traZODone (DESYREL) 100 MG tablet TAKE 1 TABLET BY MOUTH DAILY AT NIGHT  Qty: 90 tablet, Refills: 1    Comments: DX Code Needed  . Associated Diagnoses: Insomnia, unspecified type      aspirin 81 MG EC tablet Take 81 mg by mouth daily             Time Spent on discharge is more than 30 minutes in the examination, evaluation, counseling and review of medications and discharge plan. Signed:    Matthieu Pollard MD   10/7/2022      Thank you AMANDA Watson CNP for the opportunity to be involved in this patient's care. If you have any questions or concerns, please feel free to contact me at [phone].

## 2022-10-07 NOTE — FLOWSHEET NOTE
10/07/22 0930   Vital Signs   Temp 97 °F (36.1 °C)   Temp Source Oral   Heart Rate 75   Heart Rate Source Monitor   Resp 16   /80   BP Location Left upper arm   BP Method Automatic   MAP (Calculated) 96.67   Patient Position Semi fowlers   Level of Consciousness 0   MEWS Score 1   Pain Assessment   Pain Assessment 0-10   Pain Level 6   Opioid-Induced Sedation   POSS Score 1   Oxygen Therapy   SpO2 97 %   O2 Device None (Room air)   Shift assessment complete. See flow sheet. Scheduled medications given, See MAR. Head to toe complete. Vital signs logged and active bowel sounds in all 4 quadrants. Pt awake in bed. Medications taken without difficulty. Pt has 4 lap sites. No further needs noted at this time. Call light and bedside table within reach. Bed in lowest position, wheels locked and side rails up x2.      Houston Ambrocio RN

## 2022-10-07 NOTE — PLAN OF CARE
Problem: Discharge Planning  Goal: Discharge to home or other facility with appropriate resources  10/6/2022 2304 by Arti Samuels RN  Outcome: Progressing  10/6/2022 0937 by Clemencia Houston RN  Outcome: Progressing     Problem: Pain  Goal: Verbalizes/displays adequate comfort level or baseline comfort level  10/6/2022 2304 by Arti Samuels RN  Outcome: Progressing  10/6/2022 0937 by Clemencia Houston RN  Outcome: Progressing     Problem: Gastrointestinal - Adult  Goal: Minimal or absence of nausea and vomiting  10/6/2022 2304 by Arti Samuels RN  Outcome: Progressing  10/6/2022 0937 by Clemencia Houston RN  Outcome: Progressing  Goal: Maintains adequate nutritional intake  10/6/2022 2304 by Arti Samuels RN  Outcome: Progressing  10/6/2022 0937 by Clemencia Houston RN  Outcome: Progressing

## 2022-10-07 NOTE — PROGRESS NOTES
Discharge instructions completed with pt and pt spouse. Verbalized understanding. Pt provided with paper scripts. Pt refused transport and ambulated independently with spouse to personal vehicle.

## 2022-10-07 NOTE — OP NOTE
[address]                 [address] [address]                                OPERATIVE REPORT    PATIENT NAME: Kehinde Love                :        1980  MED REC NO:   [id number]                          ROOM:       4572  ACCOUNT NO:   [de-identified]                           ADMIT DATE: 10/02/2022  PROVIDER:     Olive Andre MD    DATE OF PROCEDURE:  10/06/2022    PREOPERATIVE DIAGNOSIS:  Cholecystitis. POSTOPERATIVE DIAGNOSIS:  Cholecystitis. PROCEDURE:  Robotic cholecystectomy. ANESTHESIA:  General.    SURGEON:  Olive Andre MD    ESTIMATED BLOOD LOSS:  Less than 100 mL. INDICATIONS:  The patient is a 49-year-old gentleman, who presented with  abdominal pain, was found to have acute cholecystitis. He did not  respond to initial nonoperative management and therefore is brought for  cholecystectomy. OPERATIVE SUMMARY:  After preoperative evaluation, the patient was  brought in the operating suite and placed in a comfortable supine  position on the operating room table. Monitoring equipment was attached  and general anesthesia was induced. His abdomen was sterilely prepped  and draped, and a small incision was made at the inferior aspect of the  umbilicus. This was dissected down to the fascia, and a suture of  0-Ethibond was placed on either side of the midline. The midline fascia  was opened and the peritoneal cavity was entered directly. A  figure-of-eight suture was placed across the defect for later closure. A Dominick trocar was inserted, and the abdomen was insufflated to a  pressure of 15 mmHg. The remaining ports were placed under direct  internal visualization. The robot was docked and the gallbladder was  seen to be markedly distended. It was evacuated of some of its contents  and then grasped and elevated cephalad over the liver edge.   The  infundibular structures were dissected out, and the cystic duct and  artery were identified. Each was tracked down to its origin, triply  clipped, and divided. The gallbladder was then dissected free of the  liver bed using electrocautery. It was set aside and the gallbladder  fossa was examined. A few small bleeding areas were cauterized. Following this, the gallbladder fossa was examined. There was no  further bleeding. There was no bile leakage either by direct  visualization or Firefly imaging. The clips were intact in the proximal  structures. The robot was therefore undocked and the trocars were  withdrawn, and the gallbladder was removed via the umbilical fascial  defect in an Endopouch. The umbilical fascial defect was then closed  with the previously placed figure-of-eight suture of 0-Ethibond. Wounds  were injected with Marcaine, and the skin incisions closed with  subcuticular sutures of 4-0 Vicryl followed by Benzoin, Steri-Strips,  and dry sterile dressings. All sponge, needle, and instrument counts  were correct at the end of the case. The patient tolerated the  procedure well. He was taken to recovery area in stable condition.         Bradley Godwin MD    D: 10/06/2022 14:34:29       T: 10/06/2022 14:38:02     [email or web address]  Job#: [id number]     Doc#: [id number]    CC:  _____ _____

## 2022-10-07 NOTE — DISCHARGE INSTRUCTIONS
Patient Discharge Instructions      Discharge Date:  10/7/2022    Discharged To: Home    Home with Home Health Care: No    RESUME ACTIVITY:      BATHING: May shower over the incision but, do not submerge the incision in water (no bathing, swimming, or hot-tubs). DRIVING: No driving while taking pain medication and can move freely without pain    RETURN TO WORK: May return to work in 2 weeks on light duty. WALKING: May ambulate as tolerated    SEXUAL ACTIVITY: As pain tolerates    STAIRS:  Yes    LIFTING: Less than 15-20 pounds for 2-3 weeks    DIET: Home on full liquids then, may advance diet as tolerated over the next 2-3 days as your bowel function returns to normal.     WOUND CARE: May remove outer clear dressings and gauze on 10/9/22 then leave steri strips open to the air. SPECIAL INSTRUCTIONS:     Call the office at [phone] if you have a fever > 100 F, chills, persistent nausea or vomiting, increased abdominal pain, or if your incision becomes red, tender, or drains more than a small amount of clear fluid. Follow-up with Dr. Ricky Sy in 2 weeks call [phone] for an appointment. Your information:  Name: Cecilia Juan  : 1980    Your instructions: Follow instructions below.     What to do after you leave the hospital:    Recommended diet: low fat, low cholesterol diet    Recommended activity: activity as tolerated        The following personal items were collected during your admission and were returned to you:    Belongings  Dental Appliances: None  Vision - Corrective Lenses: Contact Lenses (given to GF)  Hearing Aid: None  Clothing: Other (Comment) (hospital gown)  Jewelry: None  Body Piercings Removed: N/A  Electronic Devices: None  Weapons (Notify Protective Services/Security): None  Other Valuables: Wallet  Home Medications: None  Valuables Given To: Patient, Family (Comment)  Provide Name(s) of Who Valuable(s) Were Given To: patient  Responsible person(s) in the waiting room: Sam Alexis [phone]  Patient approves for provider to speak to responsible person post operatively: Yes    Information obtained by:  By signing below, I understand that if any problems occur once I leave the hospital I am to contact MD.  I understand and acknowledge receipt of the instructions indicated above.

## 2022-10-07 NOTE — PLAN OF CARE
Problem: Discharge Planning  Goal: Discharge to home or other facility with appropriate resources  Outcome: Progressing  Flowsheets (Taken 10/7/2022 0941)  Discharge to home or other facility with appropriate resources: Identify barriers to discharge with patient and caregiver     Problem: Pain  Goal: Verbalizes/displays adequate comfort level or baseline comfort level  Outcome: Progressing     Problem: Gastrointestinal - Adult  Goal: Minimal or absence of nausea and vomiting  Outcome: Progressing  Flowsheets (Taken 10/7/2022 0941)  Minimal or absence of nausea and vomiting: Administer ordered antiemetic medications as needed  Goal: Maintains adequate nutritional intake  Outcome: Progressing

## 2022-10-07 NOTE — PROGRESS NOTES
Shift assessment complete. Alert and oriented. Dressings on abdomen clean, dry and intact. Patient complains of pain in abdomen rated 8/10. Patient complains of nausea. Prn medication administered for pain and nausea, see MAR. Patient denies any other needs at this time. Bed in lowest position. Side rails up x2. Call light in reach.

## 2022-10-07 NOTE — PROGRESS NOTES
Patient rates pain in abdomen 7/10. Patient complains of nausea. Prn medication administered, see MAR.

## 2022-10-07 NOTE — PROGRESS NOTES
General Surgery   Daily Progress Note    Pt Name: Lennox Flock  Medical Record Number: [id number]  Date of Birth 1980   Today's Date: 10/7/2022    ASSESSMENT  POD #1 s/p lap yecenia   ABD: soft, dressing intact, small amount of bloody drainage noted on umbilical dressing, mild distention, no N/V, + flatus, no BM  Labs unremarkable  VSS  Pt states \"I am having some pain but, it's a different pain. \"     PLAN  Full liquids advance as tolerated  Pt looks good POD #1 s/p lap yecenia. All discharge instructions were discussed with the patient and his girlfriend at the bedside, all questions were answered and they verbalized understanding. He can be discharged home from a surgical standpoint. Case discussed with Dr. Demetria Lee has slightly improved from yesterday. Pain is well controlled. He has no nausea and no vomiting. He has passed flatus and has not had a bowel movement. He is tolerating a full liquid diet. Current activity is up with assistance    OBJECTIVE  VITALS:  height is 6' (1.829 m) and weight is 238 lb 1.6 oz (108 kg). His oral temperature is 97 °F (36.1 °C). His blood pressure is 130/80 and his pulse is 75. His respiration is 16 and oxygen saturation is 97%. VITALS:  /80   Pulse 75   Temp 97 °F (36.1 °C) (Oral)   Resp 16   Ht 6' (1.829 m)   Wt 238 lb 1.6 oz (108 kg)   SpO2 97%   BMI 32.29 kg/m²   INTAKE/OUTPUT:    Intake/Output Summary (Last 24 hours) at 10/7/2022 1433  Last data filed at 10/7/2022 1246  Gross per 24 hour   Intake 1360 ml   Output --   Net 1360 ml     URINARY CATHETER OUTPUT ([place]):     GENERAL: alert, cooperative, no distress    I/O last 3 completed shifts:   In: 1000 [I.V.:1000]  Out: -   I/O this shift:  In: 360 [P.O.:360]  Out: -     LABS  Recent Labs     10/05/22  1237   WBC 9.2   HGB 13.7   HCT 40.2*         K 4.4      CO2 30   BUN 8   CREATININE 1.2   CALCIUM 9.1   AST 19   ALT 23   BILITOT 0.6   CBC with Differential: Lab Results   Component Value Date/Time    WBC 9.2 10/05/2022 12:37 PM    RBC 4.77 10/05/2022 12:37 PM    HGB 13.7 10/05/2022 12:37 PM    HCT 40.2 10/05/2022 12:37 PM     10/05/2022 12:37 PM    MCV 84.3 10/05/2022 12:37 PM    MCH 28.7 10/05/2022 12:37 PM    MCHC 34.0 10/05/2022 12:37 PM    RDW 13.7 10/05/2022 12:37 PM    LYMPHOPCT 23.9 10/05/2022 12:37 PM    MONOPCT 8.7 10/05/2022 12:37 PM    BASOPCT 1.1 10/05/2022 12:37 PM    MONOSABS 0.8 10/05/2022 12:37 PM    LYMPHSABS 2.2 10/05/2022 12:37 PM    EOSABS 0.6 10/05/2022 12:37 PM    BASOSABS 0.1 10/05/2022 12:37 PM     CMP:    Lab Results   Component Value Date/Time     10/05/2022 12:37 PM    K 4.4 10/05/2022 12:37 PM    K 4.2 10/03/2022 05:53 AM     10/05/2022 12:37 PM    CO2 30 10/05/2022 12:37 PM    BUN 8 10/05/2022 12:37 PM    CREATININE 1.2 10/05/2022 12:37 PM    GFRAA >60 10/05/2022 12:37 PM    AGRATIO 1.6 10/05/2022 12:37 PM    LABGLOM >60 10/05/2022 12:37 PM    GLUCOSE 114 10/05/2022 12:37 PM    PROT 6.8 10/05/2022 12:37 PM    LABALBU 4.2 10/05/2022 12:37 PM    CALCIUM 9.1 10/05/2022 12:37 PM    BILITOT 0.6 10/05/2022 12:37 PM    ALKPHOS 71 10/05/2022 12:37 PM    AST 19 10/05/2022 12:37 PM    ALT 23 10/05/2022 12:37 PM         AMANDA Soliman CNP  Electronically signed 10/7/2022 at 2:29 PM

## 2022-10-10 ENCOUNTER — TELEPHONE (OUTPATIENT)
Dept: FAMILY MEDICINE CLINIC | Age: 42
End: 2022-10-10

## 2022-10-10 NOTE — TELEPHONE ENCOUNTER
[facility] Trans[facility] Initial Follow Up Call    Outreach made within 2 business days of discharge: Yes    Patient: Aleyda Lopez Patient : 1980   MRN: [id number]  Reason for Admission: There are no discharge diagnoses documented for the most recent discharge. Discharge Date: 10/7/22       Attempted to contact patient for post hospital follow up call. VM left to return call to schedule TCM appointment. Discharge department/facility: [facility]    Non-face-to-face services provided:  Obtained and reviewed discharge summary and/or continuity of care documents    TCM Interactive Patient Contact:  Was patient able to fill all prescriptions: Yes  Was patient instructed to bring all medications to the follow-up visit: Yes  Is patient taking all medications as directed in the discharge summary?  Yes  Does patient understand their discharge instructions: Yes  Does patient have questions or concerns that need addressed prior to 7-14 day follow up office visit: no    Scheduled appointment with PCP within 7-14 days    Follow Up  Future Appointments   Date Time Provider Kinza Cerda   2023  8:00 AM Lorri hCristine, [address]       Obi Reynoso LPN

## 2022-10-14 NOTE — TELEPHONE ENCOUNTER
Second attempt to reach patient for post hospital follow up. VM left to return call to schedule. Patient had CHOLECYSTECTOMY.

## 2022-10-20 NOTE — TELEPHONE ENCOUNTER
[facility] Trans[facility] Initial Follow Up Call    Outreach made within 2 business days of discharge: Yes    Patient: Wiliam Nichols Patient : 1980   MRN: [id number]  Reason for Admission: There are no discharge diagnoses documented for the most recent discharge. Discharge Date: 10/7/22       Spoke with the Provider [address] [place] [address] and was advised no TCM required at this time. Discharge department/facility: [facility]    Non-face-to-face services provided:  Obtained and reviewed discharge summary and/or continuity of care documents    TCM Interactive Patient Contact:  Was patient able to fill all prescriptions: Yes  Was patient instructed to bring all medications to the follow-up visit: Yes  Is patient taking all medications as directed in the discharge summary?  Yes  Does patient understand their discharge instructions: Yes  Does patient have questions or concerns that need addressed prior to 7-14 day follow up office visit: no    Scheduled appointment with PCP within 7-14 days    Follow Up  Future Appointments   Date Time Provider Kinza Cerda   10/25/2022  9:30 AM MD DIOGENES PerezRMONT [facility]   2023  8:00 AM AMANDA Shrestha - CNP [address]       Veronica Mallory LPN

## 2022-10-25 ENCOUNTER — OFFICE VISIT (OUTPATIENT)
Dept: SURGERY | Age: 42
End: 2022-10-25

## 2022-10-25 VITALS
HEIGHT: 72 IN | SYSTOLIC BLOOD PRESSURE: 126 MMHG | WEIGHT: 225 LBS | BODY MASS INDEX: 30.48 KG/M2 | DIASTOLIC BLOOD PRESSURE: 80 MMHG

## 2022-10-25 DIAGNOSIS — Z98.890 POST-OPERATIVE STATE: Primary | ICD-10-CM

## 2022-10-25 PROCEDURE — 99024 POSTOP FOLLOW-UP VISIT: CPT | Performed by: SURGERY

## 2022-10-25 NOTE — PROGRESS NOTES
[facility] GENERAL SURGERY      S:   Patient presents s/p robotic cholecystectomy. He reports doing well. O:   Comfortable         Incision sites healing well. A:   S/P robotic cholecystectomy    P:   Follow up as needed.

## 2022-12-12 DIAGNOSIS — G47.00 INSOMNIA, UNSPECIFIED TYPE: ICD-10-CM

## 2022-12-13 RX ORDER — TRAZODONE HYDROCHLORIDE 100 MG/1
TABLET ORAL
Qty: 90 TABLET | Refills: 1 | Status: SHIPPED | OUTPATIENT
Start: 2022-12-13

## 2022-12-13 NOTE — TELEPHONE ENCOUNTER
.Refill Request     CONFIRM preferrred pharmacy with the patient. If Mail Order Rx - Pend for 90 day refill. Last Seen: Last Seen Department: 4/19/2022  Last Seen by PCP: Visit date not found    Last Written: 5/16/22 90 with 1     If no future appointment scheduled, route STAFF MESSAGE with patient name to the [facility] for scheduling. Next Appointment:   Future Appointments   Date Time Provider Kinza Cerda   2/16/2023  8:00 AM AMANDA Galo - REBECCA SCOTT  [place] - [facility]       Message sent to  to schedule appt with patient?   YES      Requested Prescriptions     Pending Prescriptions Disp Refills    traZODone (DESYREL) 100 MG tablet [Pharmacy Med Name: TRAZODONE 100 MG TABLET] 90 tablet 1     Sig: TAKE 1 TABLET BY MOUTH DAILY AT NIGHT

## 2023-05-25 DIAGNOSIS — G47.00 INSOMNIA, UNSPECIFIED TYPE: ICD-10-CM

## 2023-05-26 RX ORDER — TRAZODONE HYDROCHLORIDE 100 MG/1
TABLET ORAL
Qty: 90 TABLET | Refills: 1 | Status: SHIPPED | OUTPATIENT
Start: 2023-05-26

## 2023-05-26 NOTE — TELEPHONE ENCOUNTER
Refill Request     CONFIRM preferred pharmacy with the patient. If Mail Order Rx - Pend for 90 day refill. Last Seen: Last Seen Department: 4/19/2022  Last Seen by PCP: 4/19/2022    Last Written: 12/13/2022    If no future appointment scheduled:  Review the last OV with PCP and review information for follow-up visit,  Route STAFF MESSAGE with patient name to the [facility] for scheduling with the following information:            -  Timing of next visit           -  Visit type ie Physical, OV, etc           -  Diagnoses/Reason ie. COPD, HTN - Do not use MEDICATION, Follow-up or CHECK UP - Give reason for visit      Next Appointment:   No future appointments. Message sent to [address] [place] [address] to schedule appt with patient?   N/A      Requested Prescriptions     Pending Prescriptions Disp Refills    traZODone (DESYREL) 100 MG tablet [Pharmacy Med Name: TRAZODONE 100 MG TABLET] 90 tablet 1     Sig: TAKE 1 TABLET BY MOUTH DAILY AT NIGHT

## 2023-11-02 DIAGNOSIS — G47.00 INSOMNIA, UNSPECIFIED TYPE: ICD-10-CM

## 2023-11-02 RX ORDER — TRAZODONE HYDROCHLORIDE 100 MG/1
TABLET ORAL
Qty: 90 TABLET | Refills: 1 | OUTPATIENT
Start: 2023-11-02

## 2023-11-02 SDOH — HEALTH STABILITY: PHYSICAL HEALTH: ON AVERAGE, HOW MANY DAYS PER WEEK DO YOU ENGAGE IN MODERATE TO STRENUOUS EXERCISE (LIKE A BRISK WALK)?: 2 DAYS

## 2023-11-02 SDOH — HEALTH STABILITY: PHYSICAL HEALTH: ON AVERAGE, HOW MANY MINUTES DO YOU ENGAGE IN EXERCISE AT THIS LEVEL?: 30 MIN

## 2023-11-02 NOTE — TELEPHONE ENCOUNTER
Refill Request     CONFIRM preferred pharmacy with the patient. If Mail Order Rx - Pend for 90 day refill. Last Seen: Last Seen Department: 4/19/2022  Last Seen by PCP: Visit date not found    Last Written: 5/26/23 90 with 1 refill     THIS PATIENT WAS LAST SEEN 4/19/22 WITH JULES VILLATORO AND HAS NOT ESTABLISHED CARE WITH THIS PRACTICE     If no future appointment scheduled:  Review the last OV with PCP and review information for follow-up visit,  Route STAFF MESSAGE with patient name to the [facility] for scheduling with the following information:            -  Timing of next visit           -  Visit type ie Physical, OV, etc           -  Diagnoses/Reason ie. COPD, HTN - Do not use MEDICATION, Follow-up or CHECK UP - Give reason for visit      Next Appointment:   No future appointments. Message sent to [address] [place] [address] to schedule appt with patient?   NO      Requested Prescriptions     Pending Prescriptions Disp Refills    traZODone (DESYREL) 100 MG tablet [Pharmacy Med Name: TRAZODONE 100 MG TABLET] 90 tablet 1     Sig: TAKE 1 TABLET BY MOUTH DAILY AT NIGHT

## 2023-11-03 ENCOUNTER — OFFICE VISIT (OUTPATIENT)
Dept: FAMILY MEDICINE CLINIC | Age: 43
End: 2023-11-03
Payer: COMMERCIAL

## 2023-11-03 VITALS
SYSTOLIC BLOOD PRESSURE: 123 MMHG | DIASTOLIC BLOOD PRESSURE: 81 MMHG | HEART RATE: 101 BPM | HEIGHT: 72 IN | BODY MASS INDEX: 31.97 KG/M2 | OXYGEN SATURATION: 97 % | WEIGHT: 236 LBS

## 2023-11-03 DIAGNOSIS — K42.9 UMBILICAL HERNIA WITHOUT OBSTRUCTION AND WITHOUT GANGRENE: ICD-10-CM

## 2023-11-03 DIAGNOSIS — I10 PRIMARY HYPERTENSION: ICD-10-CM

## 2023-11-03 DIAGNOSIS — F90.9 ATTENTION DEFICIT HYPERACTIVITY DISORDER (ADHD), UNSPECIFIED ADHD TYPE: ICD-10-CM

## 2023-11-03 DIAGNOSIS — E78.1 HYPERTRIGLYCERIDEMIA: ICD-10-CM

## 2023-11-03 DIAGNOSIS — G47.00 INSOMNIA, UNSPECIFIED TYPE: ICD-10-CM

## 2023-11-03 DIAGNOSIS — Z76.89 ESTABLISHING CARE WITH NEW DOCTOR, ENCOUNTER FOR: Primary | ICD-10-CM

## 2023-11-03 DIAGNOSIS — F10.21 PERSONAL HISTORY OF ALCOHOLISM (HCC): ICD-10-CM

## 2023-11-03 DIAGNOSIS — Z23 INFLUENZA VACCINATION ADMINISTERED AT CURRENT VISIT: ICD-10-CM

## 2023-11-03 DIAGNOSIS — Z72.0 TOBACCO USE: ICD-10-CM

## 2023-11-03 DIAGNOSIS — Z13.29 SCREENING FOR THYROID DISORDER: ICD-10-CM

## 2023-11-03 PROBLEM — F10.10 AA (ALCOHOL ABUSE): Status: ACTIVE | Noted: 2023-11-03

## 2023-11-03 PROBLEM — F32.A DEPRESSION: Status: ACTIVE | Noted: 2023-11-03

## 2023-11-03 PROBLEM — E78.5 HYPERLIPIDEMIA: Status: ACTIVE | Noted: 2022-04-19

## 2023-11-03 PROCEDURE — 4004F PT TOBACCO SCREEN RCVD TLK: CPT | Performed by: NURSE PRACTITIONER

## 2023-11-03 PROCEDURE — G8417 CALC BMI ABV UP PARAM F/U: HCPCS | Performed by: NURSE PRACTITIONER

## 2023-11-03 PROCEDURE — G8482 FLU IMMUNIZE ORDER/ADMIN: HCPCS | Performed by: NURSE PRACTITIONER

## 2023-11-03 PROCEDURE — 3074F SYST BP LT 130 MM HG: CPT | Performed by: NURSE PRACTITIONER

## 2023-11-03 PROCEDURE — 3079F DIAST BP 80-89 MM HG: CPT | Performed by: NURSE PRACTITIONER

## 2023-11-03 PROCEDURE — 99214 OFFICE O/P EST MOD 30 MIN: CPT | Performed by: NURSE PRACTITIONER

## 2023-11-03 PROCEDURE — 90471 IMMUNIZATION ADMIN: CPT | Performed by: NURSE PRACTITIONER

## 2023-11-03 PROCEDURE — G8427 DOCREV CUR MEDS BY ELIG CLIN: HCPCS | Performed by: NURSE PRACTITIONER

## 2023-11-03 PROCEDURE — 90674 CCIIV4 VAC NO PRSV 0.5 ML IM: CPT | Performed by: NURSE PRACTITIONER

## 2023-11-03 RX ORDER — TRAZODONE HYDROCHLORIDE 100 MG/1
100 TABLET ORAL NIGHTLY
Qty: 30 TABLET | Refills: 0 | Status: SHIPPED | OUTPATIENT
Start: 2023-11-03 | End: 2023-12-03

## 2023-11-03 RX ORDER — FENOFIBRATE 48 MG/1
48 TABLET, COATED ORAL DAILY
Qty: 30 TABLET | Refills: 2 | Status: SHIPPED | OUTPATIENT
Start: 2023-11-03 | End: 2024-02-01

## 2023-11-03 SDOH — ECONOMIC STABILITY: FOOD INSECURITY: WITHIN THE PAST 12 MONTHS, YOU WORRIED THAT YOUR FOOD WOULD RUN OUT BEFORE YOU GOT MONEY TO BUY MORE.: NEVER TRUE

## 2023-11-03 SDOH — ECONOMIC STABILITY: FOOD INSECURITY: WITHIN THE PAST 12 MONTHS, THE FOOD YOU BOUGHT JUST DIDN'T LAST AND YOU DIDN'T HAVE MONEY TO GET MORE.: NEVER TRUE

## 2023-11-03 SDOH — ECONOMIC STABILITY: INCOME INSECURITY: HOW HARD IS IT FOR YOU TO PAY FOR THE VERY BASICS LIKE FOOD, HOUSING, MEDICAL CARE, AND HEATING?: NOT HARD AT ALL

## 2023-11-03 ASSESSMENT — ENCOUNTER SYMPTOMS
WHEEZING: 0
SHORTNESS OF BREATH: 0
EYE REDNESS: 0
BLOOD IN STOOL: 0
COLOR CHANGE: 0
SINUS PRESSURE: 0
TROUBLE SWALLOWING: 0
VOMITING: 0
CHOKING: 0
COUGH: 0
DIARRHEA: 0
EYE ITCHING: 0
BACK PAIN: 0
SORE THROAT: 0
CONSTIPATION: 0
PHOTOPHOBIA: 0
SINUS PAIN: 0
STRIDOR: 0
ABDOMINAL PAIN: 0
RHINORRHEA: 0
CHEST TIGHTNESS: 0
EYE DISCHARGE: 0
EYE PAIN: 0
NAUSEA: 0
VOICE CHANGE: 0

## 2023-11-03 ASSESSMENT — COLUMBIA-SUICIDE SEVERITY RATING SCALE - C-SSRS
4. HAVE YOU HAD THESE THOUGHTS AND HAD SOME INTENTION OF ACTING ON THEM?: NO
3. HAVE YOU BEEN THINKING ABOUT HOW YOU MIGHT KILL YOURSELF?: NO
5. HAVE YOU STARTED TO WORK OUT OR WORKED OUT THE DETAILS OF HOW TO KILL YOURSELF? DO YOU INTEND TO CARRY OUT THIS PLAN?: NO
7. DID THIS OCCUR IN THE LAST THREE MONTHS: NO

## 2023-11-03 ASSESSMENT — PATIENT HEALTH QUESTIONNAIRE - PHQ9
7. TROUBLE CONCENTRATING ON THINGS, SUCH AS READING THE NEWSPAPER OR WATCHING TELEVISION: 0
8. MOVING OR SPEAKING SO SLOWLY THAT OTHER PEOPLE COULD HAVE NOTICED. OR THE OPPOSITE, BEING SO FIGETY OR RESTLESS THAT YOU HAVE BEEN MOVING AROUND A LOT MORE THAN USUAL: 0
SUM OF ALL RESPONSES TO PHQ QUESTIONS 1-9: 0
SUM OF ALL RESPONSES TO PHQ QUESTIONS 1-9: 0
1. LITTLE INTEREST OR PLEASURE IN DOING THINGS: 0
9. THOUGHTS THAT YOU WOULD BE BETTER OFF DEAD, OR OF HURTING YOURSELF: 0
10. IF YOU CHECKED OFF ANY PROBLEMS, HOW DIFFICULT HAVE THESE PROBLEMS MADE IT FOR YOU TO DO YOUR WORK, TAKE CARE OF THINGS AT HOME, OR GET ALONG WITH OTHER PEOPLE: 0
5. POOR APPETITE OR OVEREATING: 0
SUM OF ALL RESPONSES TO PHQ QUESTIONS 1-9: 0
SUM OF ALL RESPONSES TO PHQ9 QUESTIONS 1 & 2: 0
4. FEELING TIRED OR HAVING LITTLE ENERGY: 0
3. TROUBLE FALLING OR STAYING ASLEEP: 0
SUM OF ALL RESPONSES TO PHQ QUESTIONS 1-9: 0
6. FEELING BAD ABOUT YOURSELF - OR THAT YOU ARE A FAILURE OR HAVE LET YOURSELF OR YOUR FAMILY DOWN: 0
2. FEELING DOWN, DEPRESSED OR HOPELESS: 0

## 2023-11-03 NOTE — PROGRESS NOTES
2    6. Screening for thyroid disorder    - TSH; Future  - T4, Free; Future    7. Attention deficit hyperactivity disorder (ADHD), unspecified ADHD type  -Not currently on any meds see HPI    8. Umbilical hernia without obstruction and without gangrene    - Rosa Isela Seals MD, Colorectal Surgery, [facility]    9. Primary hypertension    - Gwenith Cushing MD, Sleep Medicine, [facility]  - CBC with Auto Differential; Future  - Comprehensive Metabolic Panel; Future  - Lipid Panel; Future  - TSH; Future  - T4, Free; Future    10. Influenza vaccination administered at current visit    - Influenza, FLUCELVAX, (age 10 mo+), IM, Preservative Free, 0.5 mL    Patient will follow-up in 6 months. Return in about 6 months (around 5/3/2024).

## 2023-11-30 DIAGNOSIS — E78.1 HYPERTRIGLYCERIDEMIA: ICD-10-CM

## 2023-11-30 RX ORDER — FENOFIBRATE 48 MG/1
48 TABLET, COATED ORAL DAILY
Qty: 30 TABLET | Refills: 2 | Status: SHIPPED | OUTPATIENT
Start: 2023-11-30 | End: 2024-02-28

## 2023-11-30 NOTE — TELEPHONE ENCOUNTER
Medication:   Requested Prescriptions     Pending Prescriptions Disp Refills    fenofibrate (TRICOR) 48 MG tablet 30 tablet 2     Sig: Take 1 tablet by mouth daily        Last Filled:  11/03/2023      Patient Phone Number: [phone] (home)     Last appt: 11/3/2023   Next appt: 5/3/2024    Last OARRS:        No data to display

## 2023-12-05 DIAGNOSIS — G47.00 INSOMNIA, UNSPECIFIED TYPE: ICD-10-CM

## 2023-12-05 RX ORDER — TRAZODONE HYDROCHLORIDE 100 MG/1
100 TABLET ORAL NIGHTLY
Qty: 30 TABLET | Refills: 0 | OUTPATIENT
Start: 2023-12-05

## 2023-12-05 NOTE — TELEPHONE ENCOUNTER
Patient advised in office visit I would fill 30 days and patient needs to follow-up with sleep specialist if he wants this medication continued

## 2023-12-05 NOTE — TELEPHONE ENCOUNTER
Medication:   Requested Prescriptions     Pending Prescriptions Disp Refills    traZODone (DESYREL) 100 MG tablet [Pharmacy Med Name: TRAZODONE 100 MG TABLET] 30 tablet 0     Sig: TAKE 1 TABLET BY MOUTH EVERY DAY AT NIGHT        Last Filled:  11/03/2023    Patient Phone Number: [phone] (home)     Last appt: 11/3/2023   Next appt: 5/3/2024    Last OARRS:        No data to display

## (undated) DEVICE — CLIP INT L POLYMER LOK LIG HEM O LOK

## (undated) DEVICE — DRESSING TRNSPAR W2XL2.75IN FLM SHT SEMIPERMEABLE WIND

## (undated) DEVICE — TISSUE RETRIEVAL SYSTEM: Brand: INZII RETRIEVAL SYSTEM

## (undated) DEVICE — CANNULA SEAL

## (undated) DEVICE — REDUCER: Brand: ENDOWRIST

## (undated) DEVICE — GAUZE,SPONGE,2"X2",8PLY,STERILE,LF,2'S: Brand: MEDLINE

## (undated) DEVICE — SUTURE VCRL SZ 4-0 L18IN ABSRB UD L19MM PS-2 3/8 CIR PRIM J496H

## (undated) DEVICE — SUTURE ETHBND EXCEL SZ 0 L18IN NONABSORBABLE GRN L22MM MO-7 CX41D

## (undated) DEVICE — TROCAR: Brand: KII FIOS FIRST ENTRY

## (undated) DEVICE — 3M™ STERI-STRIP™ COMPOUND BENZOIN TINCTURE 40 BAGS/CARTON 4 CARTONS/CASE C1544: Brand: 3M™ STERI-STRIP™

## (undated) DEVICE — ENDOSCOPIC KIT 2 12 FT OP4 DE2 GWN SYR

## (undated) DEVICE — YANKAUER,BULB TIP,W/O VENT,RIGID,STERILE: Brand: MEDLINE

## (undated) DEVICE — [HIGH FLOW HEATED INSUFFLATOR TUBING,  DO NOT USE IF PACKAGE IS DAMAGED]

## (undated) DEVICE — SEAL

## (undated) DEVICE — Device

## (undated) DEVICE — CONMED SCOPE SAVER BITE BLOCK, 20X27 MM: Brand: SCOPE SAVER

## (undated) DEVICE — BLADELESS OBTURATOR: Brand: WECK VISTA

## (undated) DEVICE — CANNULA,OXY,ADULT,SUPERSOFT,W/7'TUB,SC: Brand: MEDLINE INDUSTRIES, INC.

## (undated) DEVICE — ARM DRAPE

## (undated) DEVICE — STRIP,CLOSURE,WOUND,MEDI-STRIP,1/2X4: Brand: MEDLINE

## (undated) DEVICE — GLOVE,SURG,SENSICARE SLT,LF,PF,7.5: Brand: MEDLINE